# Patient Record
Sex: FEMALE | Race: WHITE | NOT HISPANIC OR LATINO | Employment: UNEMPLOYED | URBAN - METROPOLITAN AREA
[De-identification: names, ages, dates, MRNs, and addresses within clinical notes are randomized per-mention and may not be internally consistent; named-entity substitution may affect disease eponyms.]

---

## 2018-01-10 NOTE — PROGRESS NOTES
2016         RE: Lurline Cushing                                   To: ParasANDRZEJ Paulino    MR#: [id number]   : [facility] [address]: [id number]                             Fax: [phone]   (Exam #: [id number])      The LMP of this 23year old,  G1, P0-0-0-0 patient was 2016, her   working MIO is 2016 and the current gestational age is 25 weeks 0   days by 1st Trimester Sono  A sonographic examination was performed on 2016 using real time equipment  The ultrasound examination was   performed using abdominal & vaginal techniques  The patient has a BMI of   38 0  Her blood pressure today was 115/72  Earliest ultrasound found in her record: 16 8w1d 16 MIO      Thank you very much for your kind referral of Lurline Cushing to the [facility] in [facility] in 2016 for level II ultrasound evaluation   and  consult  George Morales is a 24-year-old primigravida who is   currently at 20-0/7 weeks gestation by an estimated due date of 2016  Her prenatal course so far has been unremarkable, with the   exception of a Pap smear which revealed HGSIL  George Morales has no complaints  She reports fetal movement and denies vaginal bleeding  She declined   genetic screening earlier in the pregnancy  She has not yet been screened   for gestational diabetes during this pregnancy      George Morales is morbidly obese, with a current BMI of 38 0  She has a history   of a seizure disorder as a child, currently not treated medically  She has   had no seizure activity for many years  She has a history of mild,   intermittent asthma, for which she uses a rescue inhaler as needed, and a   history of a spinal fracture  Her past surgical history is negative  George Morales currently takes no medication with the exception of a prenatal   vitamin on a daily basis and has no known drug allergy   She denies    alcohol or illicit drug use during the pregnancy, but smokes one quarter   pack of cigarettes per day  The family genetic history is negative with   respect to genetic abnormalities, birth defects, or mental retardation  Her dad has hypertension  The family medical history is otherwise negative   with respect to first degree relatives with hypertension, diabetes, or   venous thromboembolism  Cardiac motion was observed at 150 bpm       INDICATIONS      fetal anatomical survey   morbid obesity      Exam Types      LEVEL II   Transvaginal      RESULTS      Fetus # 1 of 1   Transverse presentation   Fetal growth appeared normal   Placenta Location = Anterior   No placenta previa   Placenta Grade = I      MEASUREMENTS (* Included In Average GA)      AC              14 3 cm        19 weeks 2 days* (38%)   BPD              4 5 cm        19 weeks 4 days* (41%)   HC              17 3 cm        19 weeks 6 days* (41%)   Femur            3 3 cm        20 weeks 4 days* (51%)      Nuchal Fold      3 3 mm      Humerus          3 1 cm        20 weeks 2 days  (59%)   Radius           2 5 cm        20 weeks 3 days   Ulna             2 8 cm        20 weeks 1 day   Tibia            2 8 cm        19 weeks 6 days  (43%)   Fibula           2 8 cm        19 weeks 4 days   Foot             3 7 cm        21 weeks 4 days      Cerebellum       2 1 cm        20 weeks 4 days   Biorbit          3 1 cm        19 weeks 5 days   CisternaMagna    4 1 mm      HC/AC           1 21   FL/AC           0 23   FL/BPD          0 74   EFW (Ac/Fl/Hc)   321 grams - 0 lbs 11 oz      THE AVERAGE GESTATIONAL AGE is 19 weeks 6 days +/- 10 days  AMNIOTIC FLUID         Largest Vertical Pocket = 6 0 cm   Amniotic Fluid: Normal      UTERINE ARTERIES                                  S/D   PI    RI    NOTCH       Left Uterine Artery              1 20         No       Right Uterine Artery             1 39         No      CERVICAL EVALUATION      The cervix appeared normal (Ultrasound Examination)        SUPINE      Cervical Length: 3 00 cm      OTHER TEST RESULTS           Funneling?: No             Dynamic Changes?: No        Resp  To TFP?: No                      Debris?: No      ANATOMY      Head                                    Normal   Face/Neck                               See Details   Th  Cav  Normal   Heart                                   Normal   Abd  Cav  Normal   Stomach                                 Normal   Right Kidney                            Normal   Left Kidney                             Normal   Bladder                                 Normal   Abd  Wall                               Normal   Spine                                   Normal   Extrems                                 Normal   Genitalia                               Normal   Placenta                                Normal   Umbl  Cord                              Normal   Uterus                                  Normal   PCI                                     Normal      ANATOMY DETAILS      Visualized Appearing Sonographically Normal:   HEAD: (Calvarium, BPD Level, Cavum, Lateral Ventricles, Choroid Plexus,   Cerebellum, Cisterna Magna);    FACE/NECK: (Neck, Nuchal Fold, Profile,   Orbits, Nose/Lips);    TH  CAV : (Diaphragm); HEART: (Four Chamber   View, Proximal Left Outflow, Proximal Right Outflow, 3 Vessel Trachea,   Short Axis of Greater Vessels, Ductal Arch, Aortic Arch, Interventricular   Septum, Interatrial Septum, Cardiac Axis, Cardiac Position);    ABD  CAV ,   STOMACH, RIGHT KIDNEY, LEFT KIDNEY, BLADDER, ABD  WALL, SPINE: (Cervical   Spine, Thoracic Spine, Lumbar Spine, Sacrum);    EXTREMS: (Lt Humerus, Rt   Humerus, Lt Forearm, Rt Forearm, Lt Hand, Rt Hand, Lt Femur, Rt Femur, Lt   Low Leg, Rt Low Leg, Lt Foot, Rt Foot);    GENITALIA (Female), PLACENTA,   UMBL   CORD, UTERUS, PCI      Not Visualized:   FACE/NECK: (Palate)      ADNEXA      The left ovary appeared normal and measured 4 9 x 2 8 x 1 5 cm with a   volume of 10 8 cc  The right ovary appeared normal and measured 3 1 x 2 5   x 1 7 cm with a volume of 6 9 cc  IMPRESSION      Alberto IUP   19 weeks and 6 days by this ultrasound  (MIO=DEC 1 2016)   20 weeks and 0 days by 1st Tri Sono  (MIO=2016)   Transverse presentation   Fetal growth appeared normal   Regular fetal heart rate of 150 bpm   Anterior placenta   No placenta previa      GENERAL COMMENT      No fetal structural abnormality or ultrasound marker for aneuploidy is   identified on the Level II ultrasound study today  The palate is   suboptimally imaged  Fetal growth, amniotic fluid volume, and maternal   uterine artery Doppler study are normal   The placenta is normal in   appearance  The cervix is normal in appearance by transvaginal sonography  Today's ultrasound findings and suggested follow-up were discussed in   detail with Ramona  We discussed that prenatal ultrasound cannot rule out   all congenital abnormalities  Morbid obesity is associated with an   increased risk for adverse pregnancy outcomes, including gestational   diabetes, fetal growth abnormalities including macrosomia, fetal   structural abnormalities, preeclampsia, venous thromboembolism,   stillbirth, and increased likelihood for  section  Arthur Cody will   return to the [address] [place] [address] at 28 weeks gestation to assess interval   growth  Evaluation of growth is also recommended in the mid to late third   trimester  Nonstress testing may be considered for additional pregnancy   surveillance beginning at 36 weeks gestation, earlier if otherwise   clinically indicated  Screening screening for gestational diabetes should   be considered soon  We also discussed that tobacco use during pregnancy is   associated with an increased risk for adverse pregnancy outcomes,   including  delivery, IUGR, abruption, and stillbirth     Enrollment   in a smoking cessation program should be considered  The face to face time, in addition to time spent discussing ultrasound   results, was 15 minutes, greater than 50% of which was spent during   counseling and coordination of care  BRITTANEY Hatfield M D     Maternal-Fetal Medicine   Electronically signed 07/17/16 10:36

## 2018-01-10 NOTE — PROGRESS NOTES
SEP 29 2016         RE: Maria Luisa Alice                                   To: ANDRZEJ Mayorga JEVON    MR#: [id number]   : [address] [place] [address] Street: [id number]                             Fax: [phone]   (Exam #: [id number])      The LMP of this 19year old,  G1, P0-0-0-0 patient was 2016, her   working MIO is 2016 and the current gestational age is 34 weeks 1   day by 1st Trimester Sono  A sonographic examination was performed on SEP   29 2016 using real time equipment  The ultrasound examination was   performed using abdominal technique  The patient has a BMI of 41 0  Her   blood pressure today was 95/59  Earliest ultrasound found in her record: 16 8w1d 16 MIO      Cardiac motion was observed at 130 bpm       INDICATIONS      morbid obesity   fetal growth   missed anatomy follow up      Exam Types      Level I      RESULTS      Fetus # 1 of 1   Vertex presentation   Fetal growth appeared normal   Placenta Location = Anterior   No placenta previa      MEASUREMENTS (* Included In Average GA)      AC              27 6 cm        31 weeks 5 days* (58%)   BPD              7 7 cm        31 weeks 1 day * (45%)   HC              29 4 cm        32 weeks 0 days* (52%)   Femur            6 2 cm        31 weeks 6 days* (63%)      Cerebellum       3 9 cm        32 weeks 6 days   CisternaMagna    8 1 mm      HC/AC           1 07   FL/AC           0 22   FL/BPD          0 80   EFW (Ac/Fl/Hc)  1855 grams - 4 lbs 1 oz                 (57%)      THE AVERAGE GESTATIONAL AGE is 31 weeks 5 days +/- 18 days  AMNIOTIC FLUID      Q1: 4 1      Q2: 5 8      Q3: 4 3      Q4: 3 2   JUAN Total = 17 3 cm   Amniotic Fluid: Normal      ANATOMY DETAILS      Visualized Appearing Sonographically Normal:   HEAD: (Calvarium, BPD Level, Cavum, Lateral Ventricles, Choroid Plexus,   Cerebellum, Cisterna Magna);    FACE/NECK: (Nose/Lips, Palate, Face);      TH  CAV : (Diaphragm);     HEART: [facility], Proximal Left   Outflow, Proximal Right Outflow, 3 Vessel Trachea, Short Axis of Greater   Vessels, Interventricular Septum, Interatrial Septum, Cardiac Axis,   Cardiac Position);    STOMACH, RIGHT KIDNEY, LEFT KIDNEY, BLADDER, PLACENTA      ANATOMY COMMENTS      The prior fetal anatomic survey was limited the area of the palate  These   anatomic views were seen today as sonographically normal within the   inherent limitations of fetal ultrasound  IMPRESSION      Alberto IUP   31 weeks and 5 days by this ultrasound  (MIO=NOV 26 2016)   31 weeks and 1 day by 1st Tri Sono  (MIO=NOV 30 2016)   Vertex presentation   Fetal growth appeared normal   Regular fetal heart rate of 130 bpm   Anterior placenta   No placenta previa      GENERAL COMMENT      On exam today the patient appears well, in no acute distress, and denies   any complaints  Her abdomen is non-tender  The fetal anatomic survey is now complete  There is no sonographic   evidence of fetal abnormalities at this time  The remainder of the survey   was completed previously  There has been appropriate interval fetal   growth  Good fetal movement and tone are seen  The amniotic fluid volume   appears normal   The placenta is anterior and it appears sonographically   normal   The patient was informed of today's findings and all of her   questions were answered  The limitations of ultrasound were reviewed with   the patient, which she accepts  Precautions and fetal kick counts were   reviewed  Recommend the patient return in weeks to initiate weekly antepartum   surveillance in the indication of body mass index greater than 40 which is   independent risk factor for stillbirth  Thank you very much for allowing us to participate in the care of this   very nice patient  Should you have any questions, please do not hesitate   to contact our office  BRITTANEY Castaneda M D     Electronically signed 09/29/16

## 2018-01-12 NOTE — PROGRESS NOTES
2016         RE: Shelda Scheuermann                                   To: ANDRZEJ Nichols    MR#: [id number]   : [address]: [id number]                             Fax: [phone]   (Exam #: [id number])      The LMP of this 19year old,  G1, P0-0-0-0 patient was 2016, her   working MIO is 2016 and the current gestational age is 42 weeks 0   days by 1st Trimester Sono  A sonographic examination was performed on 2016 using real time equipment  The ultrasound examination was performed   using abdominal technique  The patient has a BMI of 43 0  Her blood   pressure today was 116/88  Earliest ultrasound found in her record: 16 8w1d 16 MIO      Cardiac motion was observed at 121 bpm       INDICATIONS      morbid obesity      Exam Types      Amniotic Fluid Index      RESULTS      Fetus # 1 of 1   Vertex presentation   Placenta Location = Anterior   No placenta previa   Placenta Grade = II      AMNIOTIC FLUID      Q1: 4 2      Q2: 2 0      Q3: 5 6      Q4: 4 2   JUAN Total = 16 0 cm   Amniotic Fluid: Normal      IMPRESSION      Alberto IUP   36 weeks and 0 days by 1st Tri Sono  (MIO=2016)   Vertex presentation   Regular fetal heart rate of 121 bpm   Anterior placenta   No placenta previa      GENERAL COMMENT      The patient presented today for antepartum fetal surveillance secondary to   morbid obesity  She had a modified biophysical profile, which includes an   NST and evaluation of the amniotic fluid  The NST was reactive and   reassuring  The amniotic fluid index was 16 0 cm, which is normal for   gestational age  Recommend continued weekly fetal testing secondary to morbid obesity  Thank very much for allowing us to participate in the care of your   patient  Should you have any questions, please do not hesitate to contact   our office  BRITTANEY Milligan M D     Electronically signed 16 07:11

## 2020-06-11 ENCOUNTER — OFFICE VISIT (OUTPATIENT)
Dept: URGENT CARE | Facility: CLINIC | Age: 25
End: 2020-06-11
Payer: COMMERCIAL

## 2020-06-11 VITALS
TEMPERATURE: 98.2 F | RESPIRATION RATE: 18 BRPM | DIASTOLIC BLOOD PRESSURE: 65 MMHG | OXYGEN SATURATION: 98 % | HEART RATE: 73 BPM | SYSTOLIC BLOOD PRESSURE: 139 MMHG

## 2020-06-11 DIAGNOSIS — L02.221 FURUNCLE OF ABDOMINAL WALL: Primary | ICD-10-CM

## 2020-06-11 PROCEDURE — 99213 OFFICE O/P EST LOW 20 MIN: CPT | Performed by: PHYSICIAN ASSISTANT

## 2020-06-11 PROCEDURE — 90715 TDAP VACCINE 7 YRS/> IM: CPT

## 2020-06-11 PROCEDURE — 90471 IMMUNIZATION ADMIN: CPT | Performed by: PHYSICIAN ASSISTANT

## 2020-06-11 PROCEDURE — S9088 SERVICES PROVIDED IN URGENT: HCPCS | Performed by: PHYSICIAN ASSISTANT

## 2020-06-11 RX ORDER — DOXYCYCLINE 100 MG/1
100 CAPSULE ORAL 2 TIMES DAILY
Qty: 20 CAPSULE | Refills: 0 | Status: SHIPPED | OUTPATIENT
Start: 2020-06-11 | End: 2020-06-21

## 2021-07-28 ENCOUNTER — APPOINTMENT (EMERGENCY)
Dept: NON INVASIVE DIAGNOSTICS | Facility: HOSPITAL | Age: 26
End: 2021-07-28
Payer: COMMERCIAL

## 2021-07-28 ENCOUNTER — APPOINTMENT (EMERGENCY)
Dept: RADIOLOGY | Facility: HOSPITAL | Age: 26
End: 2021-07-28
Payer: COMMERCIAL

## 2021-07-28 ENCOUNTER — HOSPITAL ENCOUNTER (EMERGENCY)
Facility: HOSPITAL | Age: 26
Discharge: HOME | End: 2021-07-28
Attending: EMERGENCY MEDICINE | Admitting: EMERGENCY MEDICINE
Payer: COMMERCIAL

## 2021-07-28 ENCOUNTER — APPOINTMENT (EMERGENCY)
Dept: CT IMAGING | Facility: HOSPITAL | Age: 26
End: 2021-07-28
Payer: COMMERCIAL

## 2021-07-28 VITALS
OXYGEN SATURATION: 96 % | DIASTOLIC BLOOD PRESSURE: 73 MMHG | RESPIRATION RATE: 16 BRPM | TEMPERATURE: 98.6 F | HEART RATE: 72 BPM | SYSTOLIC BLOOD PRESSURE: 109 MMHG
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
## 2021-07-28 LAB
ANION GAP SERPL CALCULATED.3IONS-SCNC: 10 MMOL/L (ref 4–13)
BASOPHILS # BLD AUTO: 0 THOUSANDS/ΜL (ref 0–0.1)
BASOPHILS NFR BLD AUTO: 0 % (ref 0–2)
BUN SERPL-MCNC: 25 MG/DL (ref 7–25)
CALCIUM SERPL-MCNC: 10.1 MG/DL (ref 8.6–10.5)
CHLORIDE SERPL-SCNC: 99 MMOL/L (ref 98–107)
CO2 SERPL-SCNC: 30 MMOL/L (ref 21–31)
CREAT SERPL-MCNC: 0.93 MG/DL (ref 0.6–1.2)
EOSINOPHIL # BLD AUTO: 0 THOUSAND/ΜL (ref 0–0.61)
EOSINOPHIL NFR BLD AUTO: 0 % (ref 0–5)
ERYTHROCYTE [DISTWIDTH] IN BLOOD BY AUTOMATED COUNT: 14.3 % (ref 11.5–14.5)
EXT PREG TEST URINE: NEGATIVE
EXT. CONTROL ED NAV: NORMAL
GFR SERPL CREATININE-BSD FRML MDRD: 84 ML/MIN/1.73SQ M
GLUCOSE SERPL-MCNC: 99 MG/DL (ref 65–99)
HCT VFR BLD AUTO: 36.1 % (ref 42–47)
HGB BLD-MCNC: 11.9 G/DL (ref 12–16)
LYMPHOCYTES # BLD AUTO: 1.5 THOUSANDS/ΜL (ref 0.6–4.47)
LYMPHOCYTES NFR BLD AUTO: 18 % (ref 21–51)
MCH RBC QN AUTO: 27.9 PG (ref 26–34)
MCHC RBC AUTO-ENTMCNC: 33 G/DL (ref 31–37)
MCV RBC AUTO: 85 FL (ref 81–99)
MONOCYTES # BLD AUTO: 1.1 THOUSAND/ΜL (ref 0.17–1.22)
MONOCYTES NFR BLD AUTO: 13 % (ref 2–12)
NEUTROPHILS # BLD AUTO: 5.6 THOUSANDS/ΜL (ref 1.4–6.5)
NEUTS SEG NFR BLD AUTO: 68 % (ref 42–75)
PLATELET # BLD AUTO: 246 THOUSANDS/UL (ref 149–390)
PMV BLD AUTO: 9 FL (ref 8.6–11.7)
POTASSIUM SERPL-SCNC: 3.8 MMOL/L (ref 3.5–5.5)
RBC # BLD AUTO: 4.26 MILLION/UL (ref 3.9–5.2)
SODIUM SERPL-SCNC: 139 MMOL/L (ref 134–143)
WBC # BLD AUTO: 8.2 THOUSAND/UL (ref 4.8–10.8)

## 2021-07-28 PROCEDURE — 81025 URINE PREGNANCY TEST: CPT | Performed by: EMERGENCY MEDICINE

## 2021-07-28 PROCEDURE — 36415 COLL VENOUS BLD VENIPUNCTURE: CPT | Performed by: EMERGENCY MEDICINE

## 2021-07-28 PROCEDURE — 80048 BASIC METABOLIC PNL TOTAL CA: CPT | Performed by: EMERGENCY MEDICINE

## 2021-07-28 PROCEDURE — 70498 CT ANGIOGRAPHY NECK: CPT

## 2021-07-28 PROCEDURE — 99284 EMERGENCY DEPT VISIT MOD MDM: CPT

## 2021-07-28 PROCEDURE — 85025 COMPLETE CBC W/AUTO DIFF WBC: CPT | Performed by: EMERGENCY MEDICINE

## 2021-07-28 PROCEDURE — 99284 EMERGENCY DEPT VISIT MOD MDM: CPT | Performed by: EMERGENCY MEDICINE

## 2021-07-28 RX ADMIN — IOHEXOL 85 ML: 350 INJECTION, SOLUTION INTRAVENOUS at 14:20

## 2021-07-28 NOTE — ED PROVIDER NOTES
History  Chief Complaint   Patient presents with    Medical Problem     needle tip stuck in neck  This is a 57-year-old female who reports a needle in her neck  She states that she was shooting went broke off  She states that it was a clean needle  It is not causing her significant pain  She does not think that it is in a blood vessel at this time  This happen yesterday  Reports she otherwise feels well  He has never had this happen before  Prior to Admission Medications   Prescriptions Last Dose Informant Patient Reported? Taking? Prenatal Vit-Iron Carbonyl-FA (PRENATAL MULTIVITAMIN) TABS   Yes No   Sig: Take 1 tablet by mouth daily   acetaminophen (TYLENOL) 325 mg tablet   No No   Sig: Take 1 tablet by mouth every 6 (six) hours as needed for headaches   Patient not taking: Reported on 6/11/2020   benzocaine-menthol-lanolin-aloe (DERMOPLAST) 20-0 5 % topical spray   No No   Sig: Apply 1 application topically 4 (four) times a day as needed for mild pain for up to 30 days   docusate sodium (COLACE) 100 mg capsule   No No   Sig: Take 1 capsule by mouth 2 (two) times a day for 30 days   hydrocortisone 1 % cream   No No   Sig: Apply 1 application topically daily as needed for rash for up to 13 days   ibuprofen (MOTRIN) 600 mg tablet   No No   Sig: Take 1 tablet by mouth every 6 (six) hours as needed for mild pain for up to 30 days   witch hazel-glycerin (TUCKS) topical pad   No No   Sig: Apply 1 pad topically as needed for irritation for up to 30 days      Facility-Administered Medications: None       Past Medical History:   Diagnosis Date    HSIL on Pap smear of cervix        History reviewed  No pertinent surgical history  History reviewed  No pertinent family history  I have reviewed and agree with the history as documented      E-Cigarette/Vaping    E-Cigarette Use Never User      E-Cigarette/Vaping Substances     Social History     Tobacco Use    Smoking status: Current Every Day Smoker     Packs/day: 0 25    Smokeless tobacco: Never Used   Vaping Use    Vaping Use: Never used   Substance Use Topics    Alcohol use: No    Drug use: Yes     Types: Heroin       Review of Systems   Constitutional: Negative for activity change, chills, fatigue and fever  HENT: Negative for congestion  Eyes: Negative for visual disturbance  Respiratory: Negative for cough, chest tightness and shortness of breath  Cardiovascular: Negative for chest pain  Gastrointestinal: Negative for abdominal pain, diarrhea and vomiting  Genitourinary: Negative for dysuria  Skin: Negative for rash  Neurological: Negative for dizziness, weakness and numbness  Physical Exam  Physical Exam  Constitutional:       General: She is not in acute distress  Appearance: She is well-developed  She is not ill-appearing, toxic-appearing or diaphoretic  HENT:      Head: Normocephalic and atraumatic  Eyes:      Conjunctiva/sclera: Conjunctivae normal       Pupils: Pupils are equal, round, and reactive to light  Cardiovascular:      Rate and Rhythm: Normal rate and regular rhythm  Heart sounds: Normal heart sounds  Pulmonary:      Effort: Pulmonary effort is normal  No respiratory distress  Breath sounds: Normal breath sounds  Abdominal:      General: Bowel sounds are normal       Palpations: Abdomen is soft  Musculoskeletal:         General: Normal range of motion  Cervical back: Normal range of motion and neck supple  Skin:     General: Skin is warm and dry  Capillary Refill: Capillary refill takes less than 2 seconds  Comments: Palpable subcutaneous object on the right lower neck   Neurological:      Mental Status: She is alert and oriented to person, place, and time     Psychiatric:         Behavior: Behavior normal          Vital Signs  ED Triage Vitals   Temperature Pulse Respirations Blood Pressure SpO2   07/28/21 1234 07/28/21 1234 07/28/21 1234 07/28/21 1234 07/28/21 1234   98 6 °F (37 °C) 99 16 119/77 96 %      Temp Source Heart Rate Source Patient Position - Orthostatic VS BP Location FiO2 (%)   07/28/21 1234 07/28/21 1400 07/28/21 1457 07/28/21 1457 --   Oral Monitor Lying Right arm       Pain Score       07/28/21 1234       6           Vitals:    07/28/21 1234 07/28/21 1400 07/28/21 1457   BP: 119/77 111/74 109/73   Pulse: 99 78 72   Patient Position - Orthostatic VS:   Lying         Visual Acuity      ED Medications  Medications   iohexol (OMNIPAQUE) 350 MG/ML injection (SINGLE-DOSE) 85 mL (85 mL Intravenous Given 7/28/21 1420)       Diagnostic Studies  Results Reviewed     Procedure Component Value Units Date/Time    Basic metabolic panel [[id number]] Collected: 07/28/21 1318    Lab Status: Final result Specimen: Blood from Arm, Left Updated: 07/28/21 1347     Sodium 139 mmol/L      Potassium 3 8 mmol/L      Chloride 99 mmol/L      CO2 30 mmol/L      ANION GAP 10 mmol/L      BUN 25 mg/dL      Creatinine 0 93 mg/dL      Glucose 99 mg/dL      Calcium 10 1 mg/dL      eGFR 84 ml/min/1 73sq m     Narrative:      [place] guidelines for Chronic Kidney Disease (CKD):     Stage 1 with normal or high GFR (GFR > 90 mL/min/1 73 square meters)    Stage 2 Mild CKD (GFR = 60-89 mL/min/1 73 square meters)    Stage 3A Moderate CKD (GFR = 45-59 mL/min/1 73 square meters)    Stage 3B Moderate CKD (GFR = 30-44 mL/min/1 73 square meters)    Stage 4 Severe CKD (GFR = 15-29 mL/min/1 73 square meters)    Stage 5 End Stage CKD (GFR <15 mL/min/1 73 square meters)  Note: GFR calculation is accurate only with a steady state creatinine    POCT pregnancy, urine [[id number]]  (Normal) Resulted: 07/28/21 1328    Lab Status: Final result Updated: 07/28/21 1328     EXT PREG TEST UR (Ref: Negative) negative     Control valid    CBC and differential [[id number]]  (Abnormal) Collected: 07/28/21 1318    Lab Status: Final result Specimen: Blood from Arm, Left Updated: 07/28/21 1324 WBC 8 20 Thousand/uL      RBC 4 26 Million/uL      Hemoglobin 11 9 g/dL      Hematocrit 36 1 %      MCV 85 fL      MCH 27 9 pg      MCHC 33 0 g/dL      RDW 14 3 %      MPV 9 0 fL      Platelets 312 Thousands/uL      Neutrophils Relative 68 %      Lymphocytes Relative 18 %      Monocytes Relative 13 %      Eosinophils Relative 0 %      Basophils Relative 0 %      Neutrophils Absolute 5 60 Thousands/µL      Lymphocytes Absolute 1 50 Thousands/µL      Monocytes Absolute 1 10 Thousand/µL      Eosinophils Absolute 0 00 Thousand/µL      Basophils Absolute 0 00 Thousands/µL                  CTA neck with and without contrast   Final Result by Stepan Brown DO (07/28 1433)      Small, 7 mm, metallic foreign body identified within the right anterior inferior soft tissues of the neck best seen on series 8 image 37, suggestive of a small needle fragment  No soft tissue mass or adenopathy  Workstation performed: [id number]                    Procedures  Procedures         ED Course  ED Course as of Jul 28 1558 Wed Jul 28, 2021   1455 Awaiting ENT consult  MDM  Number of Diagnoses or Management Options  Accident caused by a hypodermic needle, initial encounter: new and requires workup  Diagnosis management comments: This is a 13-year-old female with a retained hypodermic needle  Case discussed with vascular Dr Costa Andersen who recommended CTA  Needle does not appear to be in any blood vessels and is quite superficial   Case discussed with ENT Dr Owen Alfaro who stated they will see her tomorrow in Dr Gabby Palmer and anastasiia' office  Discussed warning signs and symptoms with the patient as well as when to return to the emergency department versus follow up with PC P  Patient states understanding and agreement with the plan  This note was completed using dictation software            Amount and/or Complexity of Data Reviewed  Clinical lab tests: reviewed and ordered  Tests in the radiology section of CPT®: ordered and reviewed  Discuss the patient with other providers: yes        Disposition  Final diagnoses:   Accident caused by a hypodermic needle, initial encounter     Time reflects when diagnosis was documented in both MDM as applicable and the Disposition within this note     Time User Action Codes Description Comment    7/28/2021  2:52 PM Staci Tim Add Gunjanphrodglen Young  0XXA] Accident caused by a hypodermic needle, initial encounter       ED Disposition     ED Disposition Condition Date/Time Comment    Discharge Stable Wed Jul 28, 2021  2:51 PM [address] Miller discharge to home/self care              Follow-up Information     Follow up With Specialties Details Why [address]  [place], DO Otolaryngology In 1 day  [address]  [address] [facility]  [phone]            Discharge Medication List as of 7/28/2021  2:56 PM      CONTINUE these medications which have NOT CHANGED    Details   acetaminophen (TYLENOL) 325 mg tablet Take 1 tablet by mouth every 6 (six) hours as needed for headaches, Starting 11/29/2016, Until Discontinued, Print      benzocaine-menthol-lanolin-aloe (DERMOPLAST) 20-0 5 % topical spray Apply 1 application topically 4 (four) times a day as needed for mild pain for up to 30 days, Starting 11/29/2016, Until Thu 12/29/16, Print      docusate sodium (COLACE) 100 mg capsule Take 1 capsule by mouth 2 (two) times a day for 30 days, Starting 11/29/2016, Until Thu 12/29/16, Print      hydrocortisone 1 % cream Apply 1 application topically daily as needed for rash for up to 13 days, Starting 11/29/2016, Until Mon 12/12/16, Print      ibuprofen (MOTRIN) 600 mg tablet Take 1 tablet by mouth every 6 (six) hours as needed for mild pain for up to 30 days, Starting 11/29/2016, Until Thu 12/29/16, Print      Prenatal Vit-Iron Carbonyl-FA (PRENATAL MULTIVITAMIN) TABS Take 1 tablet by mouth daily, Until Discontinued, Historical Med witch hazel-glycerin (TUCKS) topical pad Apply 1 pad topically as needed for irritation for up to 30 days, Starting 11/29/2016, Until Thu 12/29/16, Print               PDMP Review     None          ED Provider  Electronically Signed by           Etelvina Downs MD  07/28/21 7201    Contacted patient to ensure that she had followed up on scheduling with ENT tomorrow  She had not  I again gave her the information and contact number  She said should call immediately  This happened around 4 pm       Received a tiger text from the ENTs office at approximately 4:45 a m  stating that she still had not called  I called 2 more times to contact the patient this time she did not answer         Etelvina Downs MD  07/28/21 2474

## 2021-10-19 ENCOUNTER — OFFICE VISIT (OUTPATIENT)
Dept: FAMILY MEDICINE CLINIC | Facility: CLINIC | Age: 26
End: 2021-10-19
Payer: COMMERCIAL

## 2021-10-19 VITALS
DIASTOLIC BLOOD PRESSURE: 84 MMHG | HEART RATE: 71 BPM | WEIGHT: 165.5 LBS | BODY MASS INDEX: 31.25 KG/M2 | OXYGEN SATURATION: 99 % | HEIGHT: 61 IN | TEMPERATURE: 98.4 F | SYSTOLIC BLOOD PRESSURE: 120 MMHG

## 2021-10-19 DIAGNOSIS — Z11.4 SCREENING FOR HIV (HUMAN IMMUNODEFICIENCY VIRUS): ICD-10-CM

## 2021-10-19 DIAGNOSIS — Z23 ENCOUNTER FOR IMMUNIZATION: ICD-10-CM

## 2021-10-19 DIAGNOSIS — R79.89 ELEVATED LIVER FUNCTION TESTS: ICD-10-CM

## 2021-10-19 DIAGNOSIS — S10.85XA: ICD-10-CM

## 2021-10-19 DIAGNOSIS — Z13.0 SCREENING FOR DEFICIENCY ANEMIA: ICD-10-CM

## 2021-10-19 DIAGNOSIS — Z11.59 NEED FOR HEPATITIS C SCREENING TEST: ICD-10-CM

## 2021-10-19 DIAGNOSIS — Z76.89 ENCOUNTER TO ESTABLISH CARE: Primary | ICD-10-CM

## 2021-10-19 PROCEDURE — 3008F BODY MASS INDEX DOCD: CPT | Performed by: NURSE PRACTITIONER

## 2021-10-19 PROCEDURE — 3725F SCREEN DEPRESSION PERFORMED: CPT | Performed by: NURSE PRACTITIONER

## 2021-10-19 PROCEDURE — 99203 OFFICE O/P NEW LOW 30 MIN: CPT | Performed by: NURSE PRACTITIONER

## 2021-10-19 RX ORDER — NALOXONE HYDROCHLORIDE 4 MG/.1ML
SPRAY NASAL AS NEEDED
COMMUNITY
Start: 2021-10-03

## 2021-10-21 ENCOUNTER — APPOINTMENT (OUTPATIENT)
Dept: LAB | Facility: HOSPITAL | Age: 26
End: 2021-10-21
Payer: COMMERCIAL

## 2021-10-21 DIAGNOSIS — Z13.0 SCREENING FOR DEFICIENCY ANEMIA: ICD-10-CM

## 2021-10-21 DIAGNOSIS — R79.89 ELEVATED LIVER FUNCTION TESTS: ICD-10-CM

## 2021-10-21 DIAGNOSIS — Z11.59 NEED FOR HEPATITIS C SCREENING TEST: ICD-10-CM

## 2021-10-21 DIAGNOSIS — Z11.4 SCREENING FOR HIV (HUMAN IMMUNODEFICIENCY VIRUS): ICD-10-CM

## 2021-10-21 LAB
ALBUMIN SERPL BCP-MCNC: 4.2 G/DL (ref 3.5–5.7)
ALP SERPL-CCNC: 50 U/L (ref 40–150)
ALT SERPL W P-5'-P-CCNC: 336 U/L (ref 7–52)
ANION GAP SERPL CALCULATED.3IONS-SCNC: 11 MMOL/L (ref 4–13)
AST SERPL W P-5'-P-CCNC: 195 U/L (ref 13–39)
BASOPHILS # BLD AUTO: 0 THOUSANDS/ΜL (ref 0–0.1)
BASOPHILS NFR BLD AUTO: 0 % (ref 0–2)
BILIRUB SERPL-MCNC: 0.6 MG/DL (ref 0.2–1)
BUN SERPL-MCNC: 12 MG/DL (ref 7–25)
CALCIUM SERPL-MCNC: 9.2 MG/DL (ref 8.6–10.5)
CHLORIDE SERPL-SCNC: 102 MMOL/L (ref 98–107)
CO2 SERPL-SCNC: 27 MMOL/L (ref 21–31)
CREAT SERPL-MCNC: 0.62 MG/DL (ref 0.6–1.2)
EOSINOPHIL # BLD AUTO: 0 THOUSAND/ΜL (ref 0–0.61)
EOSINOPHIL NFR BLD AUTO: 0 % (ref 0–5)
ERYTHROCYTE [DISTWIDTH] IN BLOOD BY AUTOMATED COUNT: 15 % (ref 11.5–14.5)
GFR SERPL CREATININE-BSD FRML MDRD: 123 ML/MIN/1.73SQ M
GLUCOSE P FAST SERPL-MCNC: 108 MG/DL (ref 65–99)
HCT VFR BLD AUTO: 36.3 % (ref 42–47)
HCV AB SER QL: NORMAL
HGB BLD-MCNC: 11.9 G/DL (ref 12–16)
LYMPHOCYTES # BLD AUTO: 1.2 THOUSANDS/ΜL (ref 0.6–4.47)
LYMPHOCYTES NFR BLD AUTO: 15 % (ref 21–51)
MCH RBC QN AUTO: 28.8 PG (ref 26–34)
MCHC RBC AUTO-ENTMCNC: 32.9 G/DL (ref 31–37)
MCV RBC AUTO: 88 FL (ref 81–99)
MONOCYTES # BLD AUTO: 0.4 THOUSAND/ΜL (ref 0.17–1.22)
MONOCYTES NFR BLD AUTO: 5 % (ref 2–12)
NEUTROPHILS # BLD AUTO: 6.4 THOUSANDS/ΜL (ref 1.4–6.5)
NEUTS SEG NFR BLD AUTO: 80 % (ref 42–75)
PLATELET # BLD AUTO: 336 THOUSANDS/UL (ref 149–390)
PMV BLD AUTO: 8.6 FL (ref 8.6–11.7)
POTASSIUM SERPL-SCNC: 3.8 MMOL/L (ref 3.5–5.5)
PROT SERPL-MCNC: 7.1 G/DL (ref 6.4–8.9)
RBC # BLD AUTO: 4.14 MILLION/UL (ref 3.9–5.2)
SODIUM SERPL-SCNC: 140 MMOL/L (ref 134–143)
WBC # BLD AUTO: 8 THOUSAND/UL (ref 4.8–10.8)

## 2021-10-21 PROCEDURE — 85025 COMPLETE CBC W/AUTO DIFF WBC: CPT

## 2021-10-21 PROCEDURE — 36415 COLL VENOUS BLD VENIPUNCTURE: CPT

## 2021-10-21 PROCEDURE — 87389 HIV-1 AG W/HIV-1&-2 AB AG IA: CPT

## 2021-10-21 PROCEDURE — 86803 HEPATITIS C AB TEST: CPT

## 2021-10-21 PROCEDURE — 80053 COMPREHEN METABOLIC PANEL: CPT

## 2021-10-23 LAB — HIV 1+2 AB+HIV1 P24 AG SERPL QL IA: NORMAL

## 2021-10-24 ENCOUNTER — HOSPITAL ENCOUNTER (EMERGENCY)
Facility: HOSPITAL | Age: 26
Discharge: HOME | End: 2021-10-24
Attending: EMERGENCY MEDICINE | Admitting: EMERGENCY MEDICINE
Payer: COMMERCIAL

## 2021-10-24 VITALS
HEART RATE: 89 BPM | SYSTOLIC BLOOD PRESSURE: 116 MMHG | RESPIRATION RATE: 16 BRPM | DIASTOLIC BLOOD PRESSURE: 75 MMHG | OXYGEN SATURATION: 98 % | TEMPERATURE: 98.4 F

## 2021-10-24 DIAGNOSIS — S10.85XA: ICD-10-CM

## 2021-10-24 DIAGNOSIS — T50.901A OVERDOSE: Primary | ICD-10-CM

## 2021-10-24 PROCEDURE — 99284 EMERGENCY DEPT VISIT MOD MDM: CPT | Performed by: EMERGENCY MEDICINE

## 2021-10-24 PROCEDURE — 96375 TX/PRO/DX INJ NEW DRUG ADDON: CPT

## 2021-10-24 PROCEDURE — 99284 EMERGENCY DEPT VISIT MOD MDM: CPT

## 2021-10-24 PROCEDURE — 96374 THER/PROPH/DIAG INJ IV PUSH: CPT

## 2021-10-24 RX ORDER — ONDANSETRON 2 MG/ML
4 INJECTION INTRAMUSCULAR; INTRAVENOUS ONCE
Status: COMPLETED | OUTPATIENT
Start: 2021-10-24 | End: 2021-10-24

## 2021-10-24 RX ORDER — NALOXONE HYDROCHLORIDE 1 MG/ML
2 INJECTION INTRAMUSCULAR; INTRAVENOUS; SUBCUTANEOUS ONCE
Status: COMPLETED | OUTPATIENT
Start: 2021-10-24 | End: 2021-10-24

## 2021-10-24 RX ADMIN — NALOXONE HYDROCHLORIDE 2 MG: 1 INJECTION PARENTERAL at 21:50

## 2021-10-24 RX ADMIN — ONDANSETRON 4 MG: 2 INJECTION INTRAMUSCULAR; INTRAVENOUS at 21:36

## 2022-04-11 ENCOUNTER — TELEPHONE (OUTPATIENT)
Dept: FAMILY MEDICINE CLINIC | Facility: CLINIC | Age: 27
End: 2022-04-11

## 2022-08-21 ENCOUNTER — TELEPHONE (OUTPATIENT)
Dept: OTHER | Facility: OTHER | Age: 27
End: 2022-08-21

## 2022-09-07 PROBLEM — N91.2 AMENORRHEA: Status: ACTIVE | Noted: 2022-09-07

## 2022-09-07 NOTE — PROGRESS NOTES
[address] [place] [address]   ULTRASOUND VISIT     SUBJECTIVE:  Brief HPI: Ezio Wooten is a 32 y o   female who presents for viability scan and dating for new pregnancy  Reports LMP was 22, giving MIO of 23 and current GA of 7w1d  Current symptoms: mild nausea in the morning, does not desire treatment  Has had vaginal discharge and itching with a smell  Patient has history of methamphetamine and heroine use  Patient states she has been clean for 7 weeks and is currently in a recovery house  Was previously using subutex, stopped going to the clinic because she did not want to pay for it  States that she has not had issues with withdrawal symptoms, and does not want treatment  OB History    Para Term  AB Living   2 1 1     1   SAB IAB Ectopic Multiple Live Births         0 1      # Outcome Date GA Lbr Tommy/2nd Weight Sex Delivery Anes PTL Lv   2 Current            1 Term 16 39w4d 11:45 / 00:26 3220 g (7 lb 1 6 oz) F Vag-Spont EPI N DAVONTE       Past Medical History:   Diagnosis Date    HSIL on Pap smear of cervix        No past surgical history on file  Review of Systems   Constitutional: Negative for chills and fever  Respiratory: Negative for cough and shortness of breath  Cardiovascular: Negative for chest pain and leg swelling  Gastrointestinal: Negative for abdominal pain, nausea and vomiting  Genitourinary: Negative for dysuria, pelvic pain, urgency, vaginal bleeding and vaginal discharge  Neurological: Negative for dizziness, light-headedness and headaches  All other systems reviewed and are negative  OBJECTIVE:  Vitals:    22 1326   BP: 123/80   Pulse: 65      Physical Exam  Vitals and nursing note reviewed  Constitutional:       General: She is not in acute distress  Appearance: She is well-developed  HENT:      Head: Normocephalic and atraumatic     Eyes:      Conjunctiva/sclera: Conjunctivae normal  Cardiovascular:      Rate and Rhythm: Normal rate and regular rhythm  Heart sounds: No murmur heard  Pulmonary:      Effort: Pulmonary effort is normal  No respiratory distress  Breath sounds: Normal breath sounds  Abdominal:      Palpations: Abdomen is soft  Tenderness: There is no abdominal tenderness  Musculoskeletal:      Cervical back: Neck supple  Skin:     General: Skin is warm and dry  Neurological:      Mental Status: She is alert  FIRST TRIMESTER OBSTETRIC ULTRASOUND  INDICATION: Amenorrhea, viability  COMPARISON: None  TECHNIQUE:   Transvaginal imaging was performed to assess the gestation, myometrial/endometrial architecture and ovarian parenchymal detail  The study includes volumetric sweeps and traditional still imaging technique  FINDINGS:  UTERUS:  Normal appearing uterus   No free fluid identified  A single intrauterine gestation is identified  Cervix appears normal      GESTATIONAL SAC:  Round, normal in appearance  AMNIOTIC FLUID/SAC SHAPE: Within expected normal range  FETAL POLE:  CRL:     1 78 mm   1 64 mm    1 68 mm   Average 1 68 = 8w1d    Cardiac activity is detected at 177 bpm      YOLK SAC:  Round, normal in appearance  ADNEXA:   Left ovary:   No adnexal mass or pathologic cyst       Right ovary:  No adnexal mass or pathologic cyst      IMPRESSION:  Single, viable IUP measuring 8w1d by this ultrasound  Single, viable IUP measuring 7w1d by LMP of 7/20/22   Fetal cardiac activity present and WNL  Uterus and adnexa normal in appearance      ASSESSMENT/PLAN:  Problem List Items Addressed This Visit        Other    Hx of preeclampsia, prior pregnancy, currently pregnant, first trimester    Amenorrhea - Primary    Relevant Orders    AMB US OB < 14 weeks single or first gestation level 1 (Completed)          IUP at 8w1d   - Will use ultrasound as final method of dating   - RTC for nursing intake and prenatal H&P  - Patient has been taking prenatal vitamin    -Return for nurse intake visit    Bacterial Vaginosis  -metronidazole 500mg daily for 7 days    Hx of preeclampsia  -recommend 162mg aspirin    Prema Craig MD  PGY-1, OBGYN  09/08/22

## 2022-09-08 ENCOUNTER — ULTRASOUND (OUTPATIENT)
Dept: OBGYN CLINIC | Facility: CLINIC | Age: 27
End: 2022-09-08

## 2022-09-08 VITALS
HEART RATE: 65 BPM | SYSTOLIC BLOOD PRESSURE: 123 MMHG | BODY MASS INDEX: 31.83 KG/M2 | WEIGHT: 168.6 LBS | DIASTOLIC BLOOD PRESSURE: 80 MMHG | HEIGHT: 61 IN

## 2022-09-08 DIAGNOSIS — N76.0 BV (BACTERIAL VAGINOSIS): ICD-10-CM

## 2022-09-08 DIAGNOSIS — B96.89 BV (BACTERIAL VAGINOSIS): ICD-10-CM

## 2022-09-08 DIAGNOSIS — O09.291 HX OF PREECLAMPSIA, PRIOR PREGNANCY, CURRENTLY PREGNANT, FIRST TRIMESTER: ICD-10-CM

## 2022-09-08 DIAGNOSIS — N91.2 AMENORRHEA: Primary | ICD-10-CM

## 2022-09-08 PROBLEM — Z87.898 HISTORY OF SUBSTANCE USE: Status: ACTIVE | Noted: 2022-09-08

## 2022-09-08 LAB
BV WHIFF TEST VAG QL: ABNORMAL
CLUE CELLS SPEC QL WET PREP: PRESENT
PH SMN: 4.5 [PH]
SL AMB POCT URINE HCG: POSITIVE
SL AMB POCT WET MOUNT: ABNORMAL
T VAGINALIS VAG QL WET PREP: ABNORMAL
YEAST VAG QL WET PREP: ABNORMAL

## 2022-09-08 PROCEDURE — 87491 CHLMYD TRACH DNA AMP PROBE: CPT

## 2022-09-08 PROCEDURE — 87591 N.GONORRHOEAE DNA AMP PROB: CPT

## 2022-09-08 PROCEDURE — 99214 OFFICE O/P EST MOD 30 MIN: CPT | Performed by: OBSTETRICS & GYNECOLOGY

## 2022-09-08 PROCEDURE — 76817 TRANSVAGINAL US OBSTETRIC: CPT | Performed by: OBSTETRICS & GYNECOLOGY

## 2022-09-08 PROCEDURE — 81025 URINE PREGNANCY TEST: CPT | Performed by: OBSTETRICS & GYNECOLOGY

## 2022-09-08 PROCEDURE — 87210 SMEAR WET MOUNT SALINE/INK: CPT | Performed by: OBSTETRICS & GYNECOLOGY

## 2022-09-08 RX ORDER — METRONIDAZOLE 500 MG/1
500 TABLET ORAL EVERY 8 HOURS SCHEDULED
Qty: 21 TABLET | Refills: 0 | Status: SHIPPED | OUTPATIENT
Start: 2022-09-08 | End: 2022-09-15

## 2022-09-10 LAB
C TRACH DNA SPEC QL NAA+PROBE: NEGATIVE
N GONORRHOEA DNA SPEC QL NAA+PROBE: NEGATIVE

## 2022-09-27 ENCOUNTER — PATIENT OUTREACH (OUTPATIENT)
Dept: OBGYN CLINIC | Facility: CLINIC | Age: 27
End: 2022-09-27

## 2022-09-27 ENCOUNTER — INITIAL PRENATAL (OUTPATIENT)
Dept: OBGYN CLINIC | Facility: CLINIC | Age: 27
End: 2022-09-27

## 2022-09-27 VITALS
DIASTOLIC BLOOD PRESSURE: 75 MMHG | HEART RATE: 69 BPM | SYSTOLIC BLOOD PRESSURE: 120 MMHG | BODY MASS INDEX: 32.47 KG/M2 | WEIGHT: 172 LBS | HEIGHT: 61 IN

## 2022-09-27 DIAGNOSIS — Z3A.10 10 WEEKS GESTATION OF PREGNANCY: ICD-10-CM

## 2022-09-27 DIAGNOSIS — Z34.91 PRENATAL CARE IN FIRST TRIMESTER: Primary | ICD-10-CM

## 2022-09-27 PROCEDURE — 3725F SCREEN DEPRESSION PERFORMED: CPT

## 2022-09-27 PROCEDURE — 99211 OFF/OP EST MAY X REQ PHY/QHP: CPT

## 2022-09-27 NOTE — PROGRESS NOTES
SONG METZ was referred by Dr Luis Meléndez for a PN SW assessment in the first trimester  This is a  expectant mother with MIO of 2023, she is accompanied today by her s/o Denisse Dan -     Pt reports this pregnancy is unplanned but welcomed  This is the second child for the patient, the first child for FOB  They do not currently live together but are interested in moving in together at some point  Pt currently is living in a recovery home in the [facility] [place] [facility]  Pt reports she has not told her family of the pregnancy yet, they are currently caring for her 7yo daughter and she wants to tell them after she has her own place to live so they don't feel like they are raising two of her children  Pt reports sobriety for 8 months  Hx of opoid use, pt reports she was maintained on Sublocade monthly through [facility] however she had a lapse in insurance and missed her last shot  Pt denies any cravings to use opioids and reports that she wishes to discontinue any MAT especially since she is pregnant and does not want her child to go through [facility]  Pt has a CRS through [facility] and feels supported through her family  Pt reports that once her sister provides her with her SS card she plans apply for the [facility]  housing program through conf of [employer] and [employer]  FOB reports he has been in recovery for six months  Pt reports that she has an open CYS case through [facility] and that her eldest child is currently with family as she focuses on recovery  Pt reports that the active goal at this time is that once she is able to get her own apartment that she will be re-united with her 7yo daughter  She is hopeful to live in [facility] and not go back to [address] [place] [address] Rd [address] is agreeable to this plan       Pt reports she had to re-apply for MA, she will have [employer] on 10/01, pt has SNAP, she plans to apply for [address] - she wasn't sure where to apply, SONG METZ provided her with the infant resource list  Pt takes the bus to appointments  Pt denies any MH  SONG CM did not ask about IPV today as partner was present  SONG CM will place patient as survelliance and f/u in two months

## 2022-09-27 NOTE — PATIENT INSTRUCTIONS
WARNING SIGNS DURING PREGNANCY  Call our office at [phone] for any of the followin  Vaginal bleeding  2  Sharp abdominal pain that does not go away  3  Fever (more than 100 4 and is not relieved by Tylenol)  4  Persistent vomiting lasting greater than 24 hours  5  Chest pain   6  Pain or burning when you urinate  7  Severe headache that doesn't resolve with Tylenol  8  Blurred vision or seeing spots in your vision  9  Sudden swelling of your face or hands  10  Redness, swelling or pain in a leg  11  A sudden weight gain in just a few days  12  Decrease in your baby's movement (after 28 weeks or the 6th month of pregnancy)  13  A loss of watery fluid from your vagina - can be a gush, a trickle or continuous wetness  14   After 20 weeks of pregnancy, rhythmic cramping (greater than 4 per hour) or menstrual like low/pelvic pain

## 2022-09-27 NOTE — LETTER
9/27/2022      This letter is to confirm that Elliott Never is pregnant and is under our care  Her Estimated Date of Delivery: 4/19/23  If you have any questions or concerns, please contact our office    Thank you,    DESMOND Silva

## 2022-09-27 NOTE — PROGRESS NOTES
OBSTETRIC INTAKE VISIT    Kirk Meléndez presents today for initial OB visit and intake at 10w6d  LMP 22  History obtained from patient and she reports it as follows:    Past Medical History:   Diagnosis Date    Asthma     took meds when younger    Depression     not on any meds now    HSIL on Pap smear of cervix     Preeclampsia     last pregnancy     History reviewed  No pertinent surgical history  OB History    Para Term  AB Living   2 1 1 0 0 1   SAB IAB Ectopic Multiple Live Births   0 0 0 0 1      # Outcome Date GA Lbr Tommy/2nd Weight Sex Delivery Anes PTL Lv   2 Current            1 Term 16 39w4d 11:45 / 00:26 3220 g (7 lb 1 6 oz) F Vag-Spont EPI N DAVONTE     Social History     Tobacco Use    Smoking status: Former Smoker     Packs/day: 0 25    Smokeless tobacco: Never Used   Vaping Use    Vaping Use: Never used   Substance Use Topics    Alcohol use: No    Drug use: Not Currently     Types: Heroin       Current Outpatient Medications   Medication Instructions    Prenatal Vit-Fe Fumarate-FA (PRENATAL VITAMINS PO) Oral       No Known Allergies    Vitals: /75   Pulse 69   Ht 5' 1" (1 549 m)   Wt 78 kg (172 lb)   LMP 10/15/2021 (Approximate)   BMI 32 50 kg/m²     Review of Systems:  Denies vaginal bleeding or leaking fluid  Denies abdominal/pelvic pain or contractions  Plan:  1  OB labwork ordered today to include Prenatal Panel, HCV antibody, Hgb fractionation cascade, Prenatal Carrier Screen Panel  Other labs include Glucose 1H PG,Comprehensive Metabolic Panel,Protein/Creatine Ratio Urine  Advised patient to have drawn within the next few days  2  Next ultrasound is scheduled for 10/17/22  3  Return in 2 weeks for H&P prenatal visit  4  Referrals placed for [facility] Ainsley Guzm[facility], , and [facility]  5  Given vaccines: None due  6  Patient's depression screening was assessed with a PHQ-2 score of 0  Their PHQ-9 score was 1   Clinically patient does not have depression  No treatment is required   in to see patient    7  Reviewed the following educational topics with patient:   -routine prenatal visit/ultrasound/labwork schedule   -hospital for delivery and office phone/answering service contact information   -nutritional demands of pregnancy, healthy dietary habits   -listeria, toxoplasmosis, seafood precautions   -weight gain expectations (based on pre-pregnant BMI)   -exercise, rest, and sexual activity during pregnancy   -abstinence from alcohol, tobacco, and illegal drugs   -common discomforts of pregnancy and appropriate management   -OTC medications safe to use in pregnancy   -genetic screening options   -vaccines in pregnancy   -symptoms to report to OB provider    -signs of PTL and pre-eclampsia    -vaginal bleeding/leaking of fluid    -severe n/v-unable to tolerate ANY food/fluids for more than 24 hours

## 2022-09-27 NOTE — LETTER
[facility] REFERRAL      Date: 9/27/2022    Patient name: Mely Rob    YOB: 1995    Estimated Date of Delivery: 4/19/23      /75   Pulse 69   Ht 5' 1" (1 549 m)   Wt 78 kg (172 lb)   LMP 10/15/2021 (Approximate)   BMI 32 50 kg/m²         Thank you,  Dejah Cook, [address] [place] [address] locations:   1  [facility] and [address]       [address]       Phone: [phone]       Fax: [phone]     2   [address]       [address]       Phone: [phone]       Fax: [phone]

## 2022-10-11 ENCOUNTER — INITIAL PRENATAL (OUTPATIENT)
Dept: OBGYN CLINIC | Facility: CLINIC | Age: 27
End: 2022-10-11

## 2022-10-11 VITALS
DIASTOLIC BLOOD PRESSURE: 79 MMHG | HEART RATE: 64 BPM | SYSTOLIC BLOOD PRESSURE: 124 MMHG | WEIGHT: 176 LBS | BODY MASS INDEX: 33.23 KG/M2 | HEIGHT: 61 IN

## 2022-10-11 DIAGNOSIS — Z34.91 PRENATAL CARE IN FIRST TRIMESTER: Primary | ICD-10-CM

## 2022-10-11 DIAGNOSIS — Z3A.12 12 WEEKS GESTATION OF PREGNANCY: ICD-10-CM

## 2022-10-11 DIAGNOSIS — Z87.59 HISTORY OF GESTATIONAL HYPERTENSION: ICD-10-CM

## 2022-10-11 DIAGNOSIS — O99.211 OBESITY AFFECTING PREGNANCY IN FIRST TRIMESTER: ICD-10-CM

## 2022-10-11 PROBLEM — N91.2 AMENORRHEA: Status: RESOLVED | Noted: 2022-09-07 | Resolved: 2022-10-11

## 2022-10-11 PROBLEM — Z87.898 HISTORY OF SUBSTANCE USE: Status: RESOLVED | Noted: 2022-09-08 | Resolved: 2022-10-11

## 2022-10-11 PROBLEM — O99.210 OBESITY AFFECTING PREGNANCY: Status: ACTIVE | Noted: 2022-10-11

## 2022-10-11 PROCEDURE — 87591 N.GONORRHOEAE DNA AMP PROB: CPT | Performed by: NURSE PRACTITIONER

## 2022-10-11 PROCEDURE — G0124 SCREEN C/V THIN LAYER BY MD: HCPCS | Performed by: PATHOLOGY

## 2022-10-11 PROCEDURE — 99214 OFFICE O/P EST MOD 30 MIN: CPT | Performed by: NURSE PRACTITIONER

## 2022-10-11 PROCEDURE — 87491 CHLMYD TRACH DNA AMP PROBE: CPT | Performed by: NURSE PRACTITIONER

## 2022-10-11 PROCEDURE — G0145 SCR C/V CYTO,THINLAYER,RESCR: HCPCS | Performed by: PATHOLOGY

## 2022-10-11 RX ORDER — ASPIRIN 81 MG/1
162 TABLET ORAL DAILY
Qty: 60 TABLET | Refills: 10 | Status: SHIPPED | OUTPATIENT
Start: 2022-10-11

## 2022-10-11 NOTE — PROGRESS NOTES
Olimpia James presents today for H&P OB visit at [address]  ZB=490/79  Wt=79 8 kg (176 lb); Body mass index is 33 25 kg/m² ; TWG=7 258 kg (16 lb)  Fetal Heart Rate: 154  Abdomen: soft, non-tender  She reports no complaints  Denies vaginal bleeding or leaking of fluid  Pap/GC/CT collected today  She has not had her OB labwork performed yet  Will go to lab directly after today's visit  Scheduled for ultrasound 10/17/22  Reviewed common discomforts of pregnancy in first trimester and warning signs  Advised to continue medications and return in 4 weeks        Current Outpatient Medications   Medication Instructions   • aspirin (ECOTRIN LOW STRENGTH) 162 mg, Oral, Daily   • Prenatal Vit-Fe Fumarate-FA (PRENATAL VITAMINS PO) Oral         G2 Problems (from 22 to present)     Problem Noted Resolved    History of gestational hypertension 10/11/2022 by DESMOND Katz No    Overview Signed 10/11/2022  2:53 PM by DESMOND Katz     Needs baseline PEC labs  Needs LDASA tx         Obesity affecting pregnancy 10/11/2022 by DESMOND Katz No    Overview Signed 10/11/2022  2:54 PM by DESMOND Katz     Prepregnant BMI=30 25  Needs early GDM screen  Serial growth scans               Past Medical History:   Diagnosis Date   • Abnormal Pap smear of cervix     2016 abn pap, colpo WNL, cryotherapy   • Asthma    • Depression    • Preeclampsia 2016     Past Surgical History:   Procedure Laterality Date   • GYNECOLOGIC CRYOSURGERY  2016     OB History        2    Para   1    Term   1       0    AB   0    Living   1       SAB   0    IAB   0    Ectopic   0    Multiple   0    Live Births   1               Social History     Tobacco Use   • Smoking status: Former Smoker     Types: Cigarettes     Quit date: 2022     Years since quittin 4   • Smokeless tobacco: Never Used   Vaping Use   • Vaping Use: Never used   Substance Use Topics   • Alcohol use: Not Currently     Comment: last used 12/2021   • Drug use: Not Currently     Types: Heroin, Methamphetamines     Comment: last used 1/28/2022

## 2022-10-11 NOTE — PATIENT INSTRUCTIONS
Thank you for your confidence in our team    We appreciate you and welcome your feedback  If you receive a survey from us, please take a few moments to let us know how we are doing     Sincerely,  Linda Bravo

## 2022-10-13 LAB
C TRACH DNA SPEC QL NAA+PROBE: NEGATIVE
N GONORRHOEA DNA SPEC QL NAA+PROBE: NEGATIVE

## 2022-10-17 ENCOUNTER — ROUTINE PRENATAL (OUTPATIENT)
Dept: PERINATAL CARE | Facility: OTHER | Age: 27
End: 2022-10-17
Payer: COMMERCIAL

## 2022-10-17 ENCOUNTER — TELEPHONE (OUTPATIENT)
Dept: OBGYN CLINIC | Facility: CLINIC | Age: 27
End: 2022-10-17

## 2022-10-17 VITALS
SYSTOLIC BLOOD PRESSURE: 114 MMHG | BODY MASS INDEX: 31.83 KG/M2 | HEIGHT: 61 IN | WEIGHT: 168.6 LBS | HEART RATE: 95 BPM | DIASTOLIC BLOOD PRESSURE: 82 MMHG

## 2022-10-17 DIAGNOSIS — N91.2 AMENORRHEA: ICD-10-CM

## 2022-10-17 DIAGNOSIS — O99.211 MATERNAL OBESITY, ANTEPARTUM, FIRST TRIMESTER: ICD-10-CM

## 2022-10-17 DIAGNOSIS — Z36.82 ENCOUNTER FOR ANTENATAL SCREENING FOR NUCHAL TRANSLUCENCY: Primary | ICD-10-CM

## 2022-10-17 DIAGNOSIS — Z3A.13 13 WEEKS GESTATION OF PREGNANCY: ICD-10-CM

## 2022-10-17 PROBLEM — R87.619 ABNORMAL PAP SMEAR OF CERVIX: Status: ACTIVE | Noted: 2022-10-17

## 2022-10-17 LAB
LAB AP GYN PRIMARY INTERPRETATION: ABNORMAL
Lab: ABNORMAL
PATH INTERP SPEC-IMP: ABNORMAL

## 2022-10-17 PROCEDURE — 76813 OB US NUCHAL MEAS 1 GEST: CPT | Performed by: OBSTETRICS & GYNECOLOGY

## 2022-10-17 PROCEDURE — 99241 PR OFFICE CONSULTATION NEW/ESTAB PATIENT 15 MIN: CPT | Performed by: OBSTETRICS & GYNECOLOGY

## 2022-10-17 NOTE — TELEPHONE ENCOUNTER
I called patient and advised her of HSIL pap smear results and need for colposcopy  She verbalizes understanding  She reports that she DID receive full Gardasil vaccination series in her childhood

## 2022-10-17 NOTE — PROGRESS NOTES
Patient chose to have [facility] Non-invasive Prenatal Screen with fetal sex  Patient given brochure and is aware [facility] will contact patients insurance and coordinate coverage  Patient made aware she will need to respond to text message or e-mail from [employer] within 2 business days or testing will be run through insurance  Patient informed text message will come from area code  "415"  Provided [employer] # [phone] and web site : [email or web address] [email or web address]     2 vials of blood were attempted in right arm without success  Patient was provided with an [facility] kit to have blood work drawn at any laboratory  Test tubes labeled with patient identifiers (name and date of birth)  Order and test tubes were verified with patient  [facility] packing instructions provided inside kit for when blood is drawn  Copy of lab order scanned to Epic media  Maternal Fetal Medicine will have results in approximately 7-10 business days and will call patient or notify via [address]  Patient aware viewing lab result online will reveal fetal sex if ordered  Patient verbalized understanding of all instructions and no questions at this time

## 2022-10-17 NOTE — LETTER
October 17, 2022     [place] [address] [facility] [address]    Patient: Anayeli Gallagher   YOB: 1995   Date of Visit: 10/17/2022       Dear Dr Laron Mckay:    Thank you for referring Rafal Loya to me for evaluation  Below are my notes for this consultation  If you have questions, please do not hesitate to call me  I look forward to following your patient along with you  Sincerely,        Cain Goldmann, MD        CC: No Recipients  Cain Goldmann, MD  10/15/2022  6:50 PM  Sign when Signing Visit  Please refer to the [facility] ultrasound report in Ob Procedures for additional information regarding today's visit

## 2022-10-20 ENCOUNTER — TELEPHONE (OUTPATIENT)
Dept: OBGYN CLINIC | Facility: CLINIC | Age: 27
End: 2022-10-20

## 2022-10-25 ENCOUNTER — TELEPHONE (OUTPATIENT)
Dept: PERINATAL CARE | Facility: OTHER | Age: 27
End: 2022-10-25

## 2022-10-25 NOTE — TELEPHONE ENCOUNTER
Left patient a message that her detailed M appointment had to be rescheduled to scan/send for 12/14 @ 8am, [facility] office  The new time, date and location were provided  The patient has been instructed to please call us back at [phone] with any questions or concerns

## 2022-10-31 ENCOUNTER — TELEPHONE (OUTPATIENT)
Dept: OBGYN CLINIC | Facility: CLINIC | Age: 27
End: 2022-10-31

## 2022-10-31 ENCOUNTER — HOSPITAL ENCOUNTER (EMERGENCY)
Facility: HOSPITAL | Age: 27
Discharge: HOME | End: 2022-10-31
Attending: EMERGENCY MEDICINE

## 2022-10-31 VITALS
OXYGEN SATURATION: 98 % | RESPIRATION RATE: 20 BRPM | WEIGHT: 174.38 LBS | TEMPERATURE: 98.3 F | DIASTOLIC BLOOD PRESSURE: 68 MMHG | BODY MASS INDEX: 32.95 KG/M2 | SYSTOLIC BLOOD PRESSURE: 117 MMHG | HEART RATE: 67 BPM

## 2022-10-31 DIAGNOSIS — O26.899 ABDOMINAL PAIN DURING PREGNANCY, ANTEPARTUM: Primary | ICD-10-CM

## 2022-10-31 DIAGNOSIS — R10.9 ABDOMINAL PAIN DURING PREGNANCY, ANTEPARTUM: Primary | ICD-10-CM

## 2022-10-31 LAB
BACTERIA UR QL AUTO: ABNORMAL /HPF
BILIRUB UR QL STRIP: NEGATIVE
CLARITY UR: CLEAR
COLOR UR: ABNORMAL
GLUCOSE UR STRIP-MCNC: NEGATIVE MG/DL
HGB UR QL STRIP.AUTO: NEGATIVE
KETONES UR STRIP-MCNC: ABNORMAL MG/DL
LEUKOCYTE ESTERASE UR QL STRIP: 25
MUCOUS THREADS UR QL AUTO: ABNORMAL
NITRITE UR QL STRIP: NEGATIVE
NON-SQ EPI CELLS URNS QL MICRO: ABNORMAL /HPF
PH UR STRIP.AUTO: 6.5 [PH]
PROT UR STRIP-MCNC: ABNORMAL MG/DL
RBC #/AREA URNS AUTO: ABNORMAL /HPF
SP GR UR STRIP.AUTO: 1.02 (ref 1–1.04)
UROBILINOGEN UA: 4 MG/DL
WBC #/AREA URNS AUTO: ABNORMAL /HPF

## 2022-10-31 RX ORDER — CEPHALEXIN 500 MG/1
500 CAPSULE ORAL EVERY 8 HOURS SCHEDULED
Qty: 12 CAPSULE | Refills: 0 | Status: SHIPPED | OUTPATIENT
Start: 2022-10-31 | End: 2022-11-04

## 2022-10-31 NOTE — TELEPHONE ENCOUNTER
Pt called office with concern, transferred to my phone for triage  Stated she wants to come in and have the baby checked  Pt has appointment scheduled in about a week  Pt hung up without disclosing any symptoms

## 2022-10-31 NOTE — Clinical Note
Annalise Lam was seen and treated in our emergency department on 10/31/2022  No restrictions    ? ? Diagnosis: ?    Raz Carbajal  ? Gil Gonzalez She may return on this date: 11/01/2022    ? If you have any questions or concerns, please don't hesitate to call        Mik Daugherty PA-C    ______________________________           _______________          _______________  Hospital Representative                              Date                                Time
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
## 2022-11-01 LAB — BACTERIA UR CULT: NORMAL

## 2022-11-08 ENCOUNTER — ROUTINE PRENATAL (OUTPATIENT)
Dept: OBGYN CLINIC | Facility: CLINIC | Age: 27
End: 2022-11-08

## 2022-11-08 VITALS
BODY MASS INDEX: 32.7 KG/M2 | DIASTOLIC BLOOD PRESSURE: 70 MMHG | SYSTOLIC BLOOD PRESSURE: 111 MMHG | WEIGHT: 173.2 LBS | HEIGHT: 61 IN | HEART RATE: 62 BPM

## 2022-11-08 DIAGNOSIS — R87.613 HSIL (HIGH GRADE SQUAMOUS INTRAEPITHELIAL LESION) ON PAP SMEAR OF CERVIX: ICD-10-CM

## 2022-11-08 DIAGNOSIS — Z3A.16 16 WEEKS GESTATION OF PREGNANCY: ICD-10-CM

## 2022-11-08 DIAGNOSIS — Z34.92 PRENATAL CARE IN SECOND TRIMESTER: Primary | ICD-10-CM

## 2022-11-08 PROBLEM — Z3A.12 12 WEEKS GESTATION OF PREGNANCY: Status: RESOLVED | Noted: 2022-10-11 | Resolved: 2022-11-08

## 2022-11-08 PROBLEM — Z30.09 BIRTH CONTROL COUNSELING: Status: ACTIVE | Noted: 2022-11-08

## 2022-11-08 NOTE — PROGRESS NOTES
Preston Kim presents today for routine OB visit at [place] [address]  Blood Pressure: 111/70  Wt=78 6 kg (173 lb 3 2 oz); Body mass index is 32 73 kg/m² ; TWG=5 987 kg (13 lb 3 2 oz)  Fetal Heart Rate: 146;    Abdomen: gravid, soft, non-tender  She reports no complaints  Denies uterine contractions or persistent cramping  Denies vaginal bleeding or leaking of fluid  Reports fetal movement  Scheduled for ultrasound 12/14/22  Pt aware she needs to get her blood work done  Flu vaccine today  Reviewed common discomforts of pregnancy in second trimester and warning signs  Advised to continue medications and return in 2 weeks        Current Outpatient Medications   Medication Instructions   • aspirin (ECOTRIN LOW STRENGTH) 162 mg, Oral, Daily   • Prenatal Vit-Fe Fumarate-FA (PRENATAL VITAMINS PO) Oral         G2 Problems (from 09/08/22 to present)     Problem Noted Resolved    Birth control counseling 11/8/2022 by Manjula Sanabria MD No    Overview Signed 11/8/2022 12:51 PM by Manjula Sanabria MD     Considering Mirena IUD, does not want weight gain         Abnormal Pap smear of cervix 10/17/2022 by DESMOND Gurrola No    Overview Signed 10/17/2022  3:35 PM by DESMOND Gurrola     HSIL pap  Needs colposcopy         History of gestational hypertension 10/11/2022 by DESMOND Gurrola No    Overview Signed 10/11/2022  2:53 PM by DESMOND Gurrola     Needs baseline PEC labs  Needs LDASA tx         Obesity affecting pregnancy 10/11/2022 by DESMOND Gurrola No    Overview Signed 10/11/2022  2:54 PM by DESMOND Gurrola     Prepregnant BMI=30 25  Needs early GDM screen  Serial growth scans

## 2022-11-30 ENCOUNTER — HOSPITAL ENCOUNTER (EMERGENCY)
Facility: HOSPITAL | Age: 27
Discharge: HOME | End: 2022-11-30
Attending: INTERNAL MEDICINE

## 2022-11-30 VITALS
OXYGEN SATURATION: 98 % | DIASTOLIC BLOOD PRESSURE: 63 MMHG | SYSTOLIC BLOOD PRESSURE: 92 MMHG | BODY MASS INDEX: 32.7 KG/M2 | RESPIRATION RATE: 18 BRPM | WEIGHT: 173.06 LBS | TEMPERATURE: 97.7 F | HEART RATE: 60 BPM

## 2022-11-30 DIAGNOSIS — J06.9 URI (UPPER RESPIRATORY INFECTION): Primary | ICD-10-CM

## 2022-11-30 NOTE — ED PROVIDER NOTES
HPI: Patient is a 32 y o  female who presents with 2 days of cough, sore throat and congestion which the patient describes at moderate  The patient has had contact with people with similar symptoms  Her daughter has the same symptoms  The patient has not taken any medication  Patient is currently 20 weeks pregnant  She denies any abdominal discomfort, uterine cramping, vaginal discharge or vaginal bleeding  She has obgyn follow up  No Known Allergies    Past Medical History:   Diagnosis Date   • Abnormal Pap smear of cervix     2016 abn pap, colpo WNL, cryotherapy; 10/2022 HSIL pap   • Asthma    • Depression    • Preeclampsia 2016      Past Surgical History:   Procedure Laterality Date   • GYNECOLOGIC CRYOSURGERY  2016     Social History     Tobacco Use   • Smoking status: Former     Types: Cigarettes     Quit date: 2022     Years since quittin 5   • Smokeless tobacco: Never   Vaping Use   • Vaping Use: Never used   Substance Use Topics   • Alcohol use: Not Currently     Comment: last used 2021   • Drug use: Not Currently     Types: Heroin, Methamphetamines     Comment: last used 2022       Nursing notes reviewed  Physical Exam:  ED Triage Vitals   Temperature Pulse Respirations Blood Pressure SpO2   224 22 18222 18222 1830 22   97 7 °F (36 5 °C) 60 18 92/63 98 %      Temp Source Heart Rate Source Patient Position - Orthostatic VS BP Location FiO2 (%)   22 -- -- --   Oral Monitor         Pain Score       --                  ROS: Positive for cough, sore throat and congestion, the remainder of a 10 organ system ROS was otherwise unremarkable    General: awake, alert, no acute distress    Head: normocephalic, atraumatic    Eyes: no scleral icterus  Ears: external ears normal, hearing grossly intact  Nose: external exam grossly normal, positive nasal discharge  Neck: symmetric, No JVD noted, trachea midline  Pulmonary: no respiratory distress, no tachypnea noted, lungs clear to auscultation bilaterally  Cardiovascular: appears well perfused, regular rate and rhythm  Abdomen: gravid  Musculoskeletal: no deformities noted, tone normal  Neuro: grossly non-focal  Psych: mood and affect appropriate    The patient is stable and has a history and physical exam consistent with a viral illness  COVID19 testing has been performed  I considered the patient's other medical conditions as applicable/noted above in my medical decision making  The patient is stable upon discharge  The plan is for supportive care at home  The patient (and any family present) verbalized understanding of the discharge instructions and warnings that would necessitate return to the Emergency Department  All questions were answered prior to discharge  Medications - No data to display  Final diagnoses:   URI (upper respiratory infection)     Time reflects when diagnosis was documented in both MDM as applicable and the Disposition within this note     Time User Action Codes Description Comment    11/30/2022  6:40 PM Jaziel Ambrose Add [J06 9] URI (upper respiratory infection)       ED Disposition     ED Disposition   Discharge    Condition   Stable    Date/Time   Wed Nov 30, 2022  6:40 PM    Comment   [address] discharge to home/self care  Follow-up Information     Follow up With Specialties Details Why Contact Doroteo Erazo, [address] [place], Nurse Practitioner Call  As needed [address] [place] [address]  [phone]          Patient's Medications   Discharge Prescriptions    No medications on file     No discharge procedures on file      Electronically Signed by       Emma Freeman MD  11/30/22 4618

## 2022-12-01 LAB
FLUAV RNA RESP QL NAA+PROBE: POSITIVE
FLUBV RNA RESP QL NAA+PROBE: NEGATIVE
SARS-COV-2 RNA RESP QL NAA+PROBE: NEGATIVE

## 2022-12-01 NOTE — PROGRESS NOTES
Colposcopy     Date/Time 12/1/2022 11:46 AM     Universal Protocol   Consent: Verbal consent obtained  Risks and benefits: risks, benefits and alternatives were discussed  Consent given by: patient  Time out: Immediately prior to procedure a "time out" was called to verify the correct patient, procedure, equipment, support staff and site/side marked as required  Patient understanding: patient states understanding of the procedure being performed  Patient consent: the patient's understanding of the procedure matches consent given  Patient identity confirmed: verbally with patient       Performed by  Edda Reeder MD     Authorized by Edda Reeder MD        Pre-procedure details     Pre-procedure timeout performed: yes      Prepped with: acetic acid and Lugol       Indication    HSIL     Procedure Details   Procedure: Colposcopy only      Under satisfactory analgesia the patient was prepped and draped in the dorsal lithotomy position: yes      Hickory speculum was placed in the vagina: yes      Under colposcopic examination the transition zone was seen in entirety: yes      Monsel's solution was applied: no      Biopsy(s): no       Post-procedure      Findings: White epithelium      Impression: Low grade cervical dysplasia      Patient tolerance of procedure:   Tolerated well, no immediate complications     Comments

## 2022-12-06 ENCOUNTER — PROCEDURE VISIT (OUTPATIENT)
Dept: OBGYN CLINIC | Facility: CLINIC | Age: 27
End: 2022-12-06

## 2022-12-06 VITALS
HEART RATE: 68 BPM | SYSTOLIC BLOOD PRESSURE: 122 MMHG | HEIGHT: 61 IN | BODY MASS INDEX: 33.76 KG/M2 | DIASTOLIC BLOOD PRESSURE: 75 MMHG | WEIGHT: 178.8 LBS

## 2022-12-06 DIAGNOSIS — Z3A.20 20 WEEKS GESTATION OF PREGNANCY: ICD-10-CM

## 2022-12-06 DIAGNOSIS — R87.613 HGSIL (HIGH GRADE SQUAMOUS INTRAEPITHELIAL LESION) ON PAP SMEAR OF CERVIX: Primary | ICD-10-CM

## 2023-01-03 ENCOUNTER — TELEPHONE (OUTPATIENT)
Dept: OBGYN CLINIC | Facility: CLINIC | Age: 28
End: 2023-01-03

## 2023-01-03 PROBLEM — Z3A.24 24 WEEKS GESTATION OF PREGNANCY: Status: ACTIVE | Noted: 2023-01-03

## 2023-01-04 ENCOUNTER — ROUTINE PRENATAL (OUTPATIENT)
Dept: OBGYN CLINIC | Facility: CLINIC | Age: 28
End: 2023-01-04

## 2023-01-04 VITALS
WEIGHT: 178.2 LBS | BODY MASS INDEX: 33.67 KG/M2 | HEART RATE: 65 BPM | SYSTOLIC BLOOD PRESSURE: 114 MMHG | DIASTOLIC BLOOD PRESSURE: 72 MMHG

## 2023-01-04 DIAGNOSIS — Z3A.25 25 WEEKS GESTATION OF PREGNANCY: ICD-10-CM

## 2023-01-04 DIAGNOSIS — Z34.92 PRENATAL CARE IN SECOND TRIMESTER: Primary | ICD-10-CM

## 2023-01-04 NOTE — PROGRESS NOTES
OB/GYN prenatal visit    S: 32 y o  Ewelina May 25w0d here for PN visit  She denies pelvic pain, contractions, vaginal bleeding, loss of fluid, and reports good fetal movement  O:  Vitals:    23 1554   BP: 114/72   Pulse: 65       Gen: no acute distress, nonlabored breathing  FHR: 140 BPM    A/P:     Problem List        Other    History of gestational hypertension    Overview     Needs baseline PEC labs; emphasized importance of completing these   Continue LDASA tx         Obesity affecting pregnancy    Overview     Prepregnant BMI=30 25  Needs early GDM screen; emphasized importance of completing  Serial growth scans; patient first needs L2 U/S for anatomy/growth         Abnormal Pap smear of cervix    Overview     HSIL pap   Colpo on 22 w/ white epithelium  Impression: low grade cervical dysplasia    Repeat postpartum           Birth control counseling    Overview     Considering Mirena IUD, does not want weight gain         25 weeks gestation of pregnancy    Overview     Level II not previously completed, scheduled for 22  Flu vaccine 22  Discussed  labor precautions and fetal kick counts    Continue prenatal vitamins   Return to clinic in 4 weeks     Prenatal lab slip and 1 hour glucose requisitions printed for patient on 23 since she has not previously completed-patient states that she is a hard stick and when she previously attempted blood work, she made them stop after two attempts  She is willing to rain again      [ ] Will need 28 week teaching, Rhogam, TDAP, and delivery consent at next visit          Other Visit Diagnoses     Prenatal care in second trimester    -  Primary              Leo Reyes MD  2023  4:12 PM

## 2023-01-17 ENCOUNTER — TELEPHONE (OUTPATIENT)
Dept: OBGYN CLINIC | Facility: CLINIC | Age: 28
End: 2023-01-17

## 2023-01-17 ENCOUNTER — HOSPITAL ENCOUNTER (OUTPATIENT)
Facility: HOSPITAL | Age: 28
Discharge: HOME | End: 2023-01-17
Attending: OBSTETRICS & GYNECOLOGY | Admitting: OBSTETRICS & GYNECOLOGY

## 2023-01-17 VITALS
OXYGEN SATURATION: 100 % | HEART RATE: 73 BPM | WEIGHT: 171.74 LBS | SYSTOLIC BLOOD PRESSURE: 99 MMHG | BODY MASS INDEX: 32.45 KG/M2 | RESPIRATION RATE: 18 BRPM | DIASTOLIC BLOOD PRESSURE: 49 MMHG | TEMPERATURE: 98.6 F

## 2023-01-17 PROBLEM — Z3A.26 26 WEEKS GESTATION OF PREGNANCY: Status: ACTIVE | Noted: 2023-01-03

## 2023-01-17 PROBLEM — O36.8120 DECREASED FETAL MOVEMENTS IN SECOND TRIMESTER: Status: ACTIVE | Noted: 2023-01-17

## 2023-01-17 NOTE — PROGRESS NOTES
L&D Triage Note - OB/GYN  Vaughn Litten Rex 32 y o  female MRN: [id number]  Unit/Bed#: L&D 328-01 Encounter: [id number]      ASSESSMENT:    Indu Melgoza is a 32 y o  Peyton Pounds at 26w6d who presents from home with decreased fetal movement  Movement returned in triage  JUAN adequate  NST reactive  Stable for discharge  PLAN:    1) Decreased fetal movement  - NST reactive, 140bpm baseline  - JUAN 12 73cm  - Counseled on fetal kick counts  - SVE deferred    2) H/o gestational hypertension  - Currently not taking aspirin  - Recommended restarting aspirin 162mg qd supplementation for preeclampsia prevention until 36w  - Patient expressed understanding and amenable to treatment plan    3) Discharge from [facility] triage with pre-term labor precautions    - Reviewed rupture of membranes, false vs true labor, decreased fetal movement, and vaginal bleeding   - Pt to call provider with any concerns and follow up at her next scheduled prenatal appointment   - Continue routine prenatal care   - Case discussed with Dr Allen Barraza:    Vaughn Litten Rex 32 y o  Peyton Pounds at [place] [address] with an Estimated Date of Delivery: 4/19/23 presented to the hospital complaining of decreased fetal movements since the morning  The patient reports that since she woke up at around 7 am she was not able to feel any fetal movements ( kicks)  Also, she have been experiencing abdominal discomfort  Unable to further explain the nature of discomfort  3 hours ago she took a shower and since then baby movements/kicks have been back to normal baseline per the patient's counting  Currently she doesn't have any active complaints  She seems a little bit worried about the incident  Ofnote: PMH is remarkable for pre eclampsia with her previous pregnancy  She was prescribed aspirin on 10-22 to be taken daily  The patient reports stopping the medication abruptly without medical advice around 1 week ago       Otherwise, no history of recent trauma, injuries or falls involving her abdomen  No chest pain or tightness, SOB or cough, dizziness or light headedness, N/V, Diarrhea or constipation  No active urinary symptoms  Tolerating oral diet  Her current obstetrical history is significant for elevated BMI    Her past obstetrical history is significant for h/o GHTN    Contractions: Paresh-Reed  Leakage of fluid: denies  Vaginal Bleeding: denies  Fetal movement: present    OBJECTIVE:    Vitals:    01/17/23 1801   BP: (!) 99/49   Pulse: 73   Resp: 18   Temp: 98 6 °F (37 °C)   SpO2: 100%       ROS:  Constitutional: Negative  Respiratory: Negative  Cardiovascular: Negative    Gastrointestinal: Negative    General Physical Exam:  General: in no apparent distress  Cardiovascular: intact distals pulses, no lower extremity edema    Lungs: non-labored breathing  Abdomen: abdomen is soft without significant tenderness, masses, organomegaly or guarding  Lower extremeties: nontender, b/l Janae's sign negative      Fetal monitoring:  FHT:  140 bpm/ Moderate 6 - 25 bpm / 10 x 10 accelerations present, no decelerations  Oliver: contractions absent     Imaging:       Abd  US   JUAN      - Q1 2 64cm     - Q2 2 30cm     - Q3 4 26cm     - Q4 3 53cm     - Total: 12 73cm   Placenta: anterior   Presentation: cephalic      Harry Ormond, MD  OBGYN PGY-2  1/17/2023 8:38 PM

## 2023-01-18 NOTE — PROCEDURES
Licha Screen, a  at 26w6d with an MIO of 2023, by Ultrasound, was seen at [facility] for the following procedure(s): $Procedure Type: JUAN]         4 Quadrant JUAN  JUAN Q1 (cm): 2 6 cm  JUAN Q2 (cm): 2 3 cm  JUAN Q3 (cm): 4 3 cm  JUAN Q4 (cm): 3 5 cm  JUAN TOTAL (cm): 12 7 cm  LVP (cm): 4 3 cm

## 2023-01-27 ENCOUNTER — ROUTINE PRENATAL (OUTPATIENT)
Dept: PERINATAL CARE | Facility: CLINIC | Age: 28
End: 2023-01-27

## 2023-01-27 VITALS
SYSTOLIC BLOOD PRESSURE: 104 MMHG | BODY MASS INDEX: 32.55 KG/M2 | HEIGHT: 61 IN | WEIGHT: 172.4 LBS | DIASTOLIC BLOOD PRESSURE: 52 MMHG | HEART RATE: 81 BPM

## 2023-01-27 DIAGNOSIS — Z3A.28 28 WEEKS GESTATION OF PREGNANCY: ICD-10-CM

## 2023-01-27 DIAGNOSIS — O99.212 OBESITY AFFECTING PREGNANCY IN SECOND TRIMESTER: ICD-10-CM

## 2023-01-27 DIAGNOSIS — Z36.3 ENCOUNTER FOR ANTENATAL SCREENING FOR MALFORMATIONS: ICD-10-CM

## 2023-01-27 DIAGNOSIS — Z87.59 HISTORY OF GESTATIONAL HYPERTENSION: ICD-10-CM

## 2023-01-27 DIAGNOSIS — O36.5990 PREGNANCY AFFECTED BY FETAL GROWTH RESTRICTION: Primary | ICD-10-CM

## 2023-01-27 NOTE — LETTER
2023     Twila Dickerson MD  [address]    Patient: Melissa Gallagher   YOB: 1995   Date of Visit: 2023       Dear Dr Fide Castañedar: Thank you for referring Debe Holstein to me for evaluation  Below are my notes for this consultation  If you have questions, please do not hesitate to call me  I look forward to following your patient along with you  Sincerely,        Aidan Price MD        CC: No Recipients  Aidan Price MD  2023  5:48 PM  Sign when Signing Visit  Debe Holstein  has no complaints today at 28w2d  She reports fetal movements and does not report any vaginal bleeding or signs of labor  Problem list:  1  Elevated pregravid BMI 30  2  History of gestational HTN    Ultrasound findings:  A viable king intrauterine pregnancy is seen on today's ultrasound, with growth at the 8th percentile consistent with mild fetal growth restriction  The fetal anatomic survey is complete, and no fetal anomalies are suspected  Amniotic fluid and placenta are within normal limits  The fetal umbilical artery Doppler is normal at 1 19 (95th percentile for this gestational age is 1 34)  Biophysical profile is 8 out of 8  Pregnancy ultrasound has limitations and is unable to detect all forms of fetal congenital abnormalities  Specific counseling was provided on the following problems:  1  Today's ultrasound findings and suggested follow-up were discussed in detail  We discussed that prenatal ultrasound cannot rule out all congenital abnormalities  We discussed that the finding of early onset fetal growth restriction is associated with an increased risk for adverse pregnancy outcomes, including indicated  birth, stillbirth, and preeclampsia  We discussed possible underlying etiologies including placental ischemic disease, aneuploidy, and fetal infection    We discussed the recommendation of NIPS to rule out chromosomal cause and CMV and toxoplasmosis testing to rule out infection  She was given a test kit for NIPS and orders were placed for CMV and toxoplasmosis  2   She was strongly encouraged to complete the early 1 hour glucose test and baseline PreE labs which were ordered previously  3  Fetal kick counts were reviewed  Follow up recommended:   1  Weekly NST, JUAN and and fetal umbilical artery Doppler  2   Growth scan in 3 weeks which was scheduled today     The total time dedicated to this patient encounter was 30 minutes (5 preparation, 15 face-to-face, and 10 documenting)  Split-shared decision-making between [employer] and myself was utilized, with the majority of the time spent by Bran Graff  Procedures that were completed today were charged separately  I reviewed her US pictures and met with the patient along with Vicky Be and recommended the medical decision making transcribed in the care of this patient      Esaw Schlatter MD

## 2023-01-27 NOTE — PROGRESS NOTES
Patient chose to have [facility] Non-invasive Prenatal Screen with fetal sex  Advised patient that she needed to go to the lab immediately  Patient given brochure and is aware [facility] will contact patients insurance and coordinate coverage  Patient made aware she will need to respond to text message or e-mail from [employer] within 2 business days or testing will be run through insurance  Patient informed text message will come from area code  "415"  Provided [address] [place] [address] # [phone] and web site : [email or web address]  Blood collection tubes labeled with patient identifiers (name, date of birth)    printed [facility] lab order and test kit given to patient to take to [facility] outpatient lab for blood collection  Copy of lab order scanned to Epic media  Maternal Fetal Medicine will have results in approximately 7-10 business days and will call patient or notify via [address]  Patient aware viewing lab result online will reveal fetal sex If ordered  Patient verbalized understanding of all instructions and no questions at this time

## 2023-01-27 NOTE — PROGRESS NOTES
Debe Holstein  has no complaints today at 28w2d  She reports fetal movements and does not report any vaginal bleeding or signs of labor  Problem list:  1  Elevated pregravid BMI 30  2  History of gestational HTN    Ultrasound findings:  A viable king intrauterine pregnancy is seen on today's ultrasound, with growth at the 8th percentile consistent with mild fetal growth restriction  The fetal anatomic survey is complete, and no fetal anomalies are suspected  Amniotic fluid and placenta are within normal limits  The fetal umbilical artery Doppler is normal at 1 19 (95th percentile for this gestational age is 1 34)  Biophysical profile is 8 out of 8  Pregnancy ultrasound has limitations and is unable to detect all forms of fetal congenital abnormalities  Specific counseling was provided on the following problems:  1  Today's ultrasound findings and suggested follow-up were discussed in detail  We discussed that prenatal ultrasound cannot rule out all congenital abnormalities  We discussed that the finding of early onset fetal growth restriction is associated with an increased risk for adverse pregnancy outcomes, including indicated  birth, stillbirth, and preeclampsia  We discussed possible underlying etiologies including placental ischemic disease, aneuploidy, and fetal infection  We discussed the recommendation of NIPS to rule out chromosomal cause and CMV and toxoplasmosis testing to rule out infection  She was given a test kit for NIPS and orders were placed for CMV and toxoplasmosis  2   She was strongly encouraged to complete the early 1 hour glucose test and baseline PreE labs which were ordered previously  3  Fetal kick counts were reviewed  Follow up recommended:   1  Weekly NST, JUAN and and fetal umbilical artery Doppler    2   Growth scan in 3 weeks which was scheduled today     The total time dedicated to this patient encounter was 30 minutes (5 preparation, 15 face-to-face, and 10 documenting)  Split-shared decision-making between [employer] and myself was utilized, with the majority of the time spent by Levon Loomis  Procedures that were completed today were charged separately  I reviewed her US pictures and met with the patient along with Keny Dalton and recommended the medical decision making transcribed in the care of this patient      Caden Marroquin MD

## 2023-01-29 ENCOUNTER — HOSPITAL ENCOUNTER (INPATIENT)
Facility: HOSPITAL | Age: 28
LOS: 5 days | Discharge: HOME | End: 2023-02-03
Attending: OBSTETRICS & GYNECOLOGY | Admitting: OBSTETRICS & GYNECOLOGY

## 2023-01-29 ENCOUNTER — HOSPITAL ENCOUNTER (EMERGENCY)
Facility: HOSPITAL | Age: 28
End: 2023-01-29
Attending: STUDENT IN AN ORGANIZED HEALTH CARE EDUCATION/TRAINING PROGRAM | Admitting: STUDENT IN AN ORGANIZED HEALTH CARE EDUCATION/TRAINING PROGRAM

## 2023-01-29 VITALS
DIASTOLIC BLOOD PRESSURE: 72 MMHG | TEMPERATURE: 97.1 F | WEIGHT: 172.3 LBS | BODY MASS INDEX: 32.56 KG/M2 | SYSTOLIC BLOOD PRESSURE: 126 MMHG | HEART RATE: 82 BPM | RESPIRATION RATE: 20 BRPM | OXYGEN SATURATION: 98 %

## 2023-01-29 DIAGNOSIS — O99.323 SUBSTANCE ABUSE AFFECTING PREGNANCY IN THIRD TRIMESTER, ANTEPARTUM: Primary | ICD-10-CM

## 2023-01-29 DIAGNOSIS — R76.8 HEPATITIS C ANTIBODY TEST POSITIVE: ICD-10-CM

## 2023-01-29 DIAGNOSIS — F11.20 FENTANYL USE DISORDER, MODERATE (HCC): Primary | ICD-10-CM

## 2023-01-29 DIAGNOSIS — S10.95XA: ICD-10-CM

## 2023-01-29 LAB
ALBUMIN SERPL BCP-MCNC: 3.7 G/DL (ref 3.5–5)
ALP SERPL-CCNC: 88 U/L (ref 43–122)
ALT SERPL W P-5'-P-CCNC: 37 U/L
AMPHETAMINES SERPL QL SCN: NEGATIVE
ANION GAP SERPL CALCULATED.3IONS-SCNC: 6 MMOL/L (ref 5–14)
AST SERPL W P-5'-P-CCNC: 38 U/L (ref 14–36)
BACTERIA UR QL AUTO: ABNORMAL /HPF
BARBITURATES UR QL: NEGATIVE
BASOPHILS # BLD AUTO: 0.02 THOUSANDS/ÂΜL (ref 0–0.1)
BASOPHILS NFR BLD AUTO: 0 % (ref 0–1)
BENZODIAZ UR QL: NEGATIVE
BILIRUB SERPL-MCNC: 0.68 MG/DL (ref 0.2–1)
BILIRUB UR QL STRIP: ABNORMAL
BUN SERPL-MCNC: 14 MG/DL (ref 5–25)
CALCIUM SERPL-MCNC: 9 MG/DL (ref 8.4–10.2)
CHLORIDE SERPL-SCNC: 106 MMOL/L (ref 96–108)
CLARITY UR: ABNORMAL
CO2 SERPL-SCNC: 23 MMOL/L (ref 21–32)
COCAINE UR QL: NEGATIVE
COLOR UR: ABNORMAL
CREAT SERPL-MCNC: 0.69 MG/DL (ref 0.6–1.2)
EOSINOPHIL # BLD AUTO: 0.13 THOUSAND/ÂΜL (ref 0–0.61)
EOSINOPHIL NFR BLD AUTO: 1 % (ref 0–6)
ERYTHROCYTE [DISTWIDTH] IN BLOOD BY AUTOMATED COUNT: 12.4 % (ref 11.6–15.1)
GFR SERPL CREATININE-BSD FRML MDRD: 118 ML/MIN/1.73SQ M
GLUCOSE SERPL-MCNC: 108 MG/DL (ref 70–99)
GLUCOSE UR STRIP-MCNC: NEGATIVE MG/DL
HCT VFR BLD AUTO: 34.4 % (ref 34.8–46.1)
HGB BLD-MCNC: 11 G/DL (ref 11.5–15.4)
HGB UR QL STRIP.AUTO: NEGATIVE
HYALINE CASTS #/AREA URNS LPF: ABNORMAL /LPF
IMM GRANULOCYTES # BLD AUTO: 0.11 THOUSAND/UL (ref 0–0.2)
IMM GRANULOCYTES NFR BLD AUTO: 1 % (ref 0–2)
KETONES UR STRIP-MCNC: ABNORMAL MG/DL
LEUKOCYTE ESTERASE UR QL STRIP: 25
LYMPHOCYTES # BLD AUTO: 2.04 THOUSANDS/ÂΜL (ref 0.6–4.47)
LYMPHOCYTES NFR BLD AUTO: 13 % (ref 14–44)
MCH RBC QN AUTO: 29.5 PG (ref 26.8–34.3)
MCHC RBC AUTO-ENTMCNC: 32 G/DL (ref 31.4–37.4)
MCV RBC AUTO: 92 FL (ref 82–98)
METHADONE UR QL: NEGATIVE
MONOCYTES # BLD AUTO: 0.68 THOUSAND/ÂΜL (ref 0.17–1.22)
MONOCYTES NFR BLD AUTO: 4 % (ref 4–12)
MUCOUS THREADS UR QL AUTO: ABNORMAL
NEUTROPHILS # BLD AUTO: 12.99 THOUSANDS/ÂΜL (ref 1.85–7.62)
NEUTS SEG NFR BLD AUTO: 81 % (ref 43–75)
NITRITE UR QL STRIP: NEGATIVE
NON-SQ EPI CELLS URNS QL MICRO: ABNORMAL /HPF
NRBC BLD AUTO-RTO: 0 /100 WBCS
OPIATES UR QL SCN: NEGATIVE
OXYCODONE+OXYMORPHONE UR QL SCN: NEGATIVE
PCP UR QL: NEGATIVE
PH UR STRIP.AUTO: 6 [PH]
PLATELET # BLD AUTO: 335 THOUSANDS/UL (ref 149–390)
PMV BLD AUTO: 10.6 FL (ref 8.9–12.7)
POTASSIUM SERPL-SCNC: 4.1 MMOL/L (ref 3.5–5.3)
PROT SERPL-MCNC: 7.3 G/DL (ref 6.4–8.4)
PROT UR STRIP-MCNC: ABNORMAL MG/DL
RBC # BLD AUTO: 3.73 MILLION/UL (ref 3.81–5.12)
RBC #/AREA URNS AUTO: ABNORMAL /HPF
SODIUM SERPL-SCNC: 135 MMOL/L (ref 135–147)
SP GR UR STRIP.AUTO: 1.02 (ref 1–1.04)
THC UR QL: NEGATIVE
UROBILINOGEN UA: 4 MG/DL
WBC # BLD AUTO: 15.97 THOUSAND/UL (ref 4.31–10.16)
WBC #/AREA URNS AUTO: ABNORMAL /HPF

## 2023-01-29 PROCEDURE — 4A1HXCZ MONITORING OF PRODUCTS OF CONCEPTION, CARDIAC RATE, EXTERNAL APPROACH: ICD-10-PCS | Performed by: OBSTETRICS & GYNECOLOGY

## 2023-01-29 NOTE — EMTALA/ACUTE CARE TRANSFER
[facility] EMERGENCY DEPARTMENT  [address]  [address] [address]  [phone]  Dept: [phone]      EMTALA TRANSFER CONSENT    NAME Oleksandr Herrera                                         1995                              MRN [id number]    I have been informed of my rights regarding examination, treatment, and transfer   by Dr Ling Moore MD    Benefits:      Risks:        Consent for Transfer:  I acknowledge that my medical condition has been evaluated and explained to me by the emergency department physician or other qualified medical person and/or my attending physician, who has recommended that I be transferred to the service of  Accepting Physician: Chema Iverson at [address] Name, [address] : [facility]  The above potential benefits of such transfer, the potential risks associated with such transfer, and the probable risks of not being transferred have been explained to me, and I fully understand them  The doctor has explained that, in my case, the benefits of transfer outweigh the risks  I agree to be transferred  I authorize the performance of emergency medical procedures and treatments upon me in both transit and upon arrival at the receiving facility  Additionally, I authorize the release of any and all medical records to the receiving facility and request they be transported with me, if possible  I understand that the safest mode of transportation during a medical emergency is an ambulance and that the Hospital advocates the use of this mode of transport  Risks of traveling to the receiving facility by car, including absence of medical control, life sustaining equipment, such as oxygen, and medical personnel has been explained to me and I fully understand them  (BENITA CORRECT BOX BELOW)  [  ]  I consent to the stated transfer and to be transported by ambulance/helicopter    [  ]  I consent to the stated transfer, but refuse transportation by ambulance and accept full responsibility for my transportation by car  I understand the risks of non-ambulance transfers and I exonerate the Hospital and its staff from any deterioration in my condition that results from this refusal     X___________________________________________    DATE  23  TIME________  Signature of patient or legally responsible individual signing on patient behalf           RELATIONSHIP TO PATIENT_________________________          Provider Certification    NAME Becca Gallagher                                         1995                              MRN [id number]    A medical screening exam was performed on the above named patient  Based on the examination:    Condition Necessitating Transfer The encounter diagnosis was Fentanyl use disorder, moderate ([address] )  Patient Condition: The patient has been stabilized such that within reasonable medical probability, no material deterioration of the patient condition or the condition of the unborn child(igor) is likely to result from the transfer    Reason for Transfer:      Transfer Requirements:  [facility]   · Space available and qualified personnel available for treatment as acknowledged by [employer]  · Agreed to accept transfer and to provide appropriate medical treatment as acknowledged by       Dr Andersen  · Appropriate medical records of the examination and treatment of the patient are provided at the time of transfer   [address], [address] _______  · Transfer will be performed by qualified personnel from [address] [place] [address]  and appropriate transfer equipment as required, including the use of necessary and appropriate life support measures      Provider Certification: I have examined the patient and explained the following risks and benefits of being transferred/refusing transfer to the patient/family:  General risk, such as traffic hazards, adverse weather conditions, rough terrain or turbulence, possible failure of equipment (including vehicle or aircraft), or consequences of actions of persons outside the control of the transport personnel      Based on these reasonable risks and benefits to the patient and/or the unborn child(igor), and based upon the information available at the time of the patient’s examination, I certify that the medical benefits reasonably to be expected from the provision of appropriate medical treatments at another medical facility outweigh the increasing risks, if any, to the individual’s medical condition, and in the case of labor to the unborn child, from effecting the transfer      X____________________________________________ DATE 01/29/23        TIME_______      ORIGINAL - SEND TO MEDICAL RECORDS   COPY - SEND WITH PATIENT DURING TRANSFER

## 2023-01-30 ENCOUNTER — APPOINTMENT (OUTPATIENT)
Dept: ULTRASOUND IMAGING | Facility: HOSPITAL | Age: 28
End: 2023-01-30

## 2023-01-30 PROBLEM — Z3A.28 28 WEEKS GESTATION OF PREGNANCY: Status: ACTIVE | Noted: 2023-01-03

## 2023-01-30 PROBLEM — R76.8 HEPATITIS C ANTIBODY TEST POSITIVE: Status: ACTIVE | Noted: 2023-01-30

## 2023-01-30 PROBLEM — F19.10 SUBSTANCE ABUSE AFFECTING PREGNANCY IN THIRD TRIMESTER, ANTEPARTUM (HCC): Status: ACTIVE | Noted: 2023-01-30

## 2023-01-30 PROBLEM — O99.323 SUBSTANCE ABUSE AFFECTING PREGNANCY IN THIRD TRIMESTER, ANTEPARTUM: Status: ACTIVE | Noted: 2023-01-30

## 2023-01-30 PROBLEM — Z28.39 RUBELLA NON-IMMUNE STATUS, ANTEPARTUM: Status: ACTIVE | Noted: 2023-01-30

## 2023-01-30 PROBLEM — S10.95XA: Status: ACTIVE | Noted: 2023-01-30

## 2023-01-30 PROBLEM — O09.899 RUBELLA NON-IMMUNE STATUS, ANTEPARTUM: Status: ACTIVE | Noted: 2023-01-30

## 2023-01-30 LAB
ABO GROUP BLD: NORMAL
BLD GP AB SCN SERPL QL: NEGATIVE
C TRACH DNA SPEC QL NAA+PROBE: NEGATIVE
CREAT UR-MCNC: 64.6 MG/DL
GLUCOSE SERPL-MCNC: 83 MG/DL (ref 65–140)
GLUCOSE SERPL-MCNC: 85 MG/DL (ref 65–140)
HAV IGM SER QL: ABNORMAL
HBV CORE AB SER QL: ABNORMAL
HBV CORE IGM SER QL: ABNORMAL
HBV CORE IGM SER QL: ABNORMAL
HBV SURFACE AG SER QL: ABNORMAL
HBV SURFACE AG SER QL: ABNORMAL
HCV AB SER QL: ABNORMAL
HCV AB SER QL: ABNORMAL
HIV 1+2 AB+HIV1 P24 AG SERPL QL IA: NORMAL
HIV1 P24 AG SER QL: NORMAL
N GONORRHOEA DNA SPEC QL NAA+PROBE: NEGATIVE
PROT UR-MCNC: 6 MG/DL
PROT/CREAT UR: 0.09 MG/G{CREAT} (ref 0–0.1)
RH BLD: NEGATIVE
RPR SER QL: NORMAL
RUBV IGG SERPL IA-ACNC: 7.3 IU/ML
SPECIMEN EXPIRATION DATE: NORMAL
TSH SERPL DL<=0.05 MIU/L-ACNC: 3 UIU/ML (ref 0.45–4.5)
VZV IGG SER QL IA: NORMAL

## 2023-01-30 PROCEDURE — 3E0234Z INTRODUCTION OF SERUM, TOXOID AND VACCINE INTO MUSCLE, PERCUTANEOUS APPROACH: ICD-10-PCS | Performed by: OBSTETRICS & GYNECOLOGY

## 2023-01-30 RX ADMIN — TETANUS TOXOID, REDUCED DIPHTHERIA TOXOID AND ACELLULAR PERTUSSIS VACCINE, ADSORBED 0.5 ML: 5; 2.5; 8; 8; 2.5 SUSPENSION INTRAMUSCULAR at 11:15

## 2023-01-30 RX ADMIN — HUMAN RHO(D) IMMUNE GLOBULIN 300 MCG: 300 INJECTION, SOLUTION INTRAMUSCULAR at 11:12

## 2023-01-30 NOTE — PROGRESS NOTES
Progress Note  Jhon Donald 32 y o  female MRN: [id number]  Unit/Bed#: L&D 327-01 Encounter: [id number]    Assessment:  32 y o  Blanche Coats at 28w5d admitted for for MAT initiation with suboxone therapy in the setting of OUD, last use on 1/29/23  Hospital day 1:    Plan:  * Substance abuse disorder affecting pregnancy in third trimester, antepartum  Assessment & Plan  · Fentanyl and heroin are drugs of choice, last use at 1600 on 1/29; 5-6 bags of fentanyl  · Currently lives in a recovery home with actively using tenants, has been using again for about 1 month  · CM consulted to help with housing  · Initial rapid UDS negative, fentanyl pending  · Injection sites visibile on R lateral neck and L dorsum of hand  · Initial COWS score 0, continue to monitor score q2h  · Plan to initiate therapy when score >/= 12    Foreign body of skin of neck  Assessment & Plan  Reported two needle tips in track line of R lateral neck  Will reach out to general surgery regarding imaging and further assessment  Affected area track line, non-expanding, w/o associated breathing concerns or apparent vascular compromise    28 weeks gestation of pregnancy  Assessment & Plan  NST TID  Denies ctx, loss of fluid, vaginal bleeding, and reports good fetal movement  Prenatal labs ordered  Can do 1hr GTT prior to discharge  Will give rhogam and tdap      Subjective/Objective   Subjective:     Contractions: no  Loss of fluid: no  Vaginal bleeding: no  Fetal movement: no    Talked to patient this morning about her current situation and desire to start suboxone  Said she has never been on methadone but used to get the long acting suboxone injection which she said worked well for her  Feels like she started using again because the recovery house she lives in is not strict and a lot of the other tenants were using  Her boyfriend also uses but she said he recently got out of [facility] and then started using again since she was using   He went to detox on the same day she was admitted  Her other child is 10years old and lives with her sister (by her request) to be cared for when she relapsed about a year ago  [employer] were involved in that case and we discussed that they will have to be involved with this child as well  She currently denies N/V but has some flushing  No other complaints at this time  Objective:     Vitals:   Temp:  [97 1 °F (36 2 °C)-97 8 °F (36 6 °C)] 97 4 °F (36 3 °C)  HR:  [63-82] 71  Resp:  [16-20] 19  BP: (102-126)/(50-72) 115/63       Physical Exam  Vitals reviewed  Constitutional:       General: She is not in acute distress  Appearance: She is normal weight  HENT:      Mouth/Throat:      Mouth: Mucous membranes are moist       Pharynx: Oropharynx is clear  Neck:      Comments: Injection site with erythema/induration on neck above right clavicle, palpable cords  Cardiovascular:      Rate and Rhythm: Normal rate  Pulses: Normal pulses  Pulmonary:      Effort: Pulmonary effort is normal  No respiratory distress  Abdominal:      Palpations: Abdomen is soft  Comments: gravid   Skin:     General: Skin is warm and dry  Neurological:      General: No focal deficit present  Mental Status: She is alert and oriented to person, place, and time  Mental status is at baseline  Fetal Assessment:  FHT: 140 / Moderate 6 - 25 bpm / 15x15, reactive  [place]: none    Lab, Imaging and other studies: I have personally reviewed pertinent reports        Lab Results   Component Value Date    WBC 15 97 (H) 01/29/2023    HGB 11 0 (L) 01/29/2023    HCT 34 4 (L) 01/29/2023    MCV 92 01/29/2023     01/29/2023       Lab Results   Component Value Date    CALCIUM 9 0 01/29/2023    K 4 1 01/29/2023    CO2 23 01/29/2023     01/29/2023    BUN 14 01/29/2023    CREATININE 0 69 01/29/2023       Lab Results   Component Value Date    ALT 37 (H) 01/29/2023    AST 38 (H) 01/29/2023    ALKPHOS 88 01/29/2023       Gloria Becca Zeng MD  OBN, PGY-2  1/30/2023  7:45 AM

## 2023-01-30 NOTE — PLAN OF CARE
Problem: PAIN - ADULT  Goal: Verbalizes/displays adequate comfort level or baseline comfort level  Description: Interventions:  - Encourage patient to monitor pain and request assistance  - Assess pain using appropriate pain scale  - Administer analgesics based on type and severity of pain and evaluate response  - Implement non-pharmacological measures as appropriate and evaluate response  - Consider cultural and social influences on pain and pain management  - Notify physician/advanced practitioner if interventions unsuccessful or patient reports new pain  Outcome: Progressing     Problem: INFECTION - ADULT  Goal: Absence or prevention of progression during hospitalization  Description: INTERVENTIONS:  - Assess and monitor for signs and symptoms of infection  - Monitor lab/diagnostic results  - Monitor all insertion sites, i e  indwelling lines, tubes, and drains  - Monitor endotracheal if appropriate and nasal secretions for changes in amount and color  - [place] appropriate cooling/warming therapies per order  - Administer medications as ordered  - Instruct and encourage patient and family to use good hand hygiene technique  - Identify and instruct in appropriate isolation precautions for identified infection/condition  Outcome: Progressing  Goal: Absence of fever/infection during neutropenic period  Description: INTERVENTIONS:  - Monitor WBC    Outcome: Progressing     Problem: SAFETY ADULT  Goal: Patient will remain free of falls  Description: INTERVENTIONS:  - Educate patient/family on patient safety including physical limitations  - Instruct patient to call for assistance with activity   - Consult OT/PT to assist with strengthening/mobility   - Keep Call bell within reach  - Keep bed low and locked with side rails adjusted as appropriate  - Keep care items and personal belongings within reach  - Initiate and maintain comfort rounds  - Make Fall Risk Sign visible to staff  - Apply yellow socks and bracelet for high fall risk patients  - Consider moving patient to room near nurses station  Outcome: Progressing  Goal: Maintain or return to baseline ADL function  Description: INTERVENTIONS:  -  Assess patient's ability to carry out ADLs; assess patient's baseline for ADL function and identify physical deficits which impact ability to perform ADLs (bathing, care of mouth/teeth, toileting, grooming, dressing, etc )  - Assess/evaluate cause of self-care deficits   - Assess range of motion  - Assess patient's mobility; develop plan if impaired  - Assess patient's need for assistive devices and provide as appropriate  - Encourage maximum independence but intervene and supervise when necessary  - Involve family in performance of ADLs  - Assess for home care needs following discharge   - Consider OT consult to assist with ADL evaluation and planning for discharge  - Provide patient education as appropriate  Outcome: Progressing  Goal: Maintains/Returns to pre admission functional level  Description: INTERVENTIONS:  - Perform BMAT or MOVE assessment daily    - Set and communicate daily mobility goal to care team and patient/family/caregiver     - Collaborate with rehabilitation services on mobility goals if consulted  - Out of bed for toileting  - Record patient progress and toleration of activity level   Outcome: Progressing     Problem: DISCHARGE PLANNING  Goal: Discharge to home or other facility with appropriate resources  Description: INTERVENTIONS:  - Identify barriers to discharge w/patient and caregiver  - Arrange for needed discharge resources and transportation as appropriate  - Identify discharge learning needs (meds, wound care, etc )  - Arrange for interpretive services to assist at discharge as needed  - Refer to Case Management Department for coordinating discharge planning if the patient needs post-hospital services based on physician/advanced practitioner order or complex needs related to functional status, cognitive ability, or social support system  Outcome: Progressing     Problem: BIRTH - VAGINAL/ SECTION  Goal: Fetal and maternal status remain reassuring during the birth process  Description: INTERVENTIONS:  - Monitor vital signs  - Monitor fetal heart rate  - Monitor uterine activity  - Monitor labor progression (vaginal delivery)  - DVT prophylaxis  - Antibiotic prophylaxis  Outcome: Progressing  Goal: Emotionally satisfying birthing experience for mother/fetus  Description: Interventions:  - Assess, plan, implement and evaluate the nursing care given to the patient in labor  - Advocate the philosophy that each childbirth experience is a unique experience and support the family's chosen level of involvement and control during the labor process   - Actively participate in both the patient's and family's teaching of the birth process  - Consider cultural, Bahai and age-specific factors and plan care for the patient in labor  Outcome: Progressing     Problem: POSTPARTUM  Goal: Experiences normal postpartum course  Description: INTERVENTIONS:  - Monitor maternal vital signs  - Assess uterine involution and lochia  Outcome: Progressing  Goal: Appropriate maternal -  bonding  Description: INTERVENTIONS:  - Identify family support  - Assess for appropriate maternal/infant bonding   -Encourage maternal/infant bonding opportunities  - Referral to  or  as needed  Outcome: Progressing  Goal: Establishment of infant feeding pattern  Description: INTERVENTIONS:  - Assess breast/bottle feeding  - Refer to lactation as needed  Outcome: Progressing  Goal: Incision(s), wounds(s) or drain site(s) healing without S/S of infection  Description: INTERVENTIONS  - Assess and document dressing, incision, wound bed, drain sites and surrounding tissue  - Provide patient and family education  Outcome: Progressing

## 2023-01-30 NOTE — ASSESSMENT & PLAN NOTE
· Hep C RNA quant 5 288  · Hep A and B negative for acute infection  · Hep A antibody non-reactive   · Hep A non-immune  · Dose 1 of 2 ordered (havrix) 2 doses required 6-12 months appart  · Hep B  · Dose 1 of 3 ordered, Pending second dose in 1 month and 3rd dose in 6 months

## 2023-01-30 NOTE — H&P
H&P Exam - Gynecology   Celeste Gallagher 32 y o  female MRN: [id number]  Unit/Bed#: L&D 327-01 Encounter: [id number]    Assessment: Devan Gregorio is a 32 y o  female who presents for inpatient detoxification in the setting of recent fentanyl abuse  History of fentanyl abuse with 5-6 bags per day  COWS score 0  No signs of withdrawal   Hemodynamically stable  Admitted for inpatient management  Plan:  Foreign body of skin of neck  Assessment & Plan  Reported two needle tips in track line of R lateral neck  Discuss with general surgery regarding imaging and further assessment  Hemodynamically stable  Affected area with papular track line, non-expanding, w/o associated breathing concerns or apparent vascular compromise    28 weeks gestation of pregnancy  Assessment & Plan  NST reactive with minimal to low moderate variability  130bpm baseline  NST TID  Denies ctx, loss of fluid, vaginal bleeding, and reports good fetal movement    * Substance abuse affecting pregnancy in third trimester, antepartum  Assessment & Plan  Fentanyl abuse, last use at 1600 on 1/29; 5-6 bags of fentanyl  Prior remission in September 2022 with remission near end of November 2022  Currently lives in a recovery home with actively using tenants  Initially presented to [facility] with desire for [employer], transferred to [facility] for continued care with obstetrics  Toxicology urine screen ordered, initial rapid UDS at [facility] negative  Track lines apparently on R lateral neck and L dorsum of hand  Initial COWS score 0  Continue to monitor score q2h  Plan to initiate therapy when score >/= 12      Discussed case and plan w/ Dr Vannessa Simmons    History of Present Illness   HPI:  Devan Gregorio is a 32 y o  female who initially presented to [facility] for desire for inpatient detoxification  Patient reports relapse into substance abuse with fentanyl using 5-6 bags per day since November 2022    On initial prenatal assessment in September 2022, patient reported 7 weeks of sobriety  Patient reported initial relapse near Thanksgiving with intermittent use of fentanyl since then  Denies use of other substances with congruent rapid urine drug screen completed at [facility]  Patient does report a rehab admission in January 2022, records not readily available in [address] [place] [address] [place]  Elis Alexander desires to initiate Suboxone therapy while inpatient but does not desire to continue medication use at discharge  We discussed protocol for Suboxone initiation including monitoring COWS scores every 2 hours and titrating medication up according to associated symptoms  We also discussed remaining open minded should Suboxone therapy reach higher doses and recommendation to either continue medication outpatient or start taper to wean off medication rather than immediate cessation  Patient expressed understanding and was amenable to treatment and plan  After discussion of Suboxone therapy, patient reported that she had 2 needle tips lodged within papular track line on her right lateral neck  She reports that the first needle tip has been there for "many months" and was previously assessed and ultimately determined that it was "safer to be left in than removed "  The second needle tip reportedly became lodged after injection today  Second track line apparent on dorsum of left hand, denies other areas of injection  I discussed with the patient that she will most likely require neck imaging for further assessment and interdisciplinary discussion with general surgery team   Patient expressed understanding and all questions answered at bedside  Review of Systems   Constitutional: Negative for chills and fever  HENT: Negative for congestion, sinus pressure and sinus pain  Eyes: Negative for visual disturbance  Respiratory: Negative for cough, shortness of breath and wheezing  Cardiovascular: Negative for chest pain, palpitations and leg swelling  Gastrointestinal: Negative for abdominal pain, constipation, diarrhea, nausea and vomiting  Genitourinary: Negative for dysuria, vaginal bleeding and vaginal discharge  Skin: Negative for color change, pallor and rash  Neurological: Negative for weakness and light-headedness  Psychiatric/Behavioral: Negative for agitation and behavioral problems         Historical Information   Past Medical History:   Diagnosis Date   • Abnormal Pap smear of cervix      abn pap, colpo WNL, cryotherapy; 10/2022 HSIL pap   • Asthma    • Depression    • Preeclampsia      Past Surgical History:   Procedure Laterality Date   • GYNECOLOGIC CRYOSURGERY  2016     OB History    Para Term  AB Living   2 1 1 0 0 1   SAB IAB Ectopic Multiple Live Births   0 0 0 0 1      # Outcome Date GA Lbr Tommy/2nd Weight Sex Delivery Anes PTL Lv   2 Current            1 Term 16 39w4d 11:45 / 00:26 3220 g (7 lb 1 6 oz) F Vag-Spont EPI N DAVONTE      Complications: Gestational hypertension     Family History   Problem Relation Age of Onset   • Cancer Neg Hx    • Venous thrombosis Neg Hx    • Diabetes Neg Hx    • Breast cancer Neg Hx    • Colon cancer Neg Hx    • Ovarian cancer Neg Hx      Social History   Social History     Substance and Sexual Activity   Alcohol Use Not Currently    Comment: last used 2021     Social History     Substance and Sexual Activity   Drug Use Not Currently   • Types: Heroin, Methamphetamines, Fentanyl    Comment: last used 2022     Social History     Tobacco Use   Smoking Status Some Days   • Types: Cigarettes   • Last attempt to quit: 2022   • Years since quittin 7   Smokeless Tobacco Never       Meds/Allergies   Medications Prior to Admission   Medication   • aspirin (ECOTRIN LOW STRENGTH) 81 mg EC tablet   • Prenatal Vit-Fe Fumarate-FA (PRENATAL VITAMINS PO)     No Known Allergies    Objective   /59   Pulse 68   Temp 97 8 °F (36 6 °C) (Oral)   Resp 16   LMP 2022 No intake or output data in the 24 hours ending 01/30/23 0354    Invasive Devices: Invasive Devices     Peripheral Intravenous Line  Duration           Peripheral IV 01/29/23 Dorsal (posterior); Right Hand <1 day                Physical Exam  Vitals and nursing note reviewed  Exam conducted with a chaperone present  Constitutional:       General: She is not in acute distress  HENT:      Head: Normocephalic  Right Ear: External ear normal       Left Ear: External ear normal    Eyes:      General: No scleral icterus  Right eye: No discharge  Left eye: No discharge  Conjunctiva/sclera: Conjunctivae normal    Cardiovascular:      Rate and Rhythm: Normal rate and regular rhythm  Pulses: Normal pulses  Heart sounds: Normal heart sounds  Pulmonary:      Effort: Pulmonary effort is normal  No respiratory distress  Breath sounds: Normal breath sounds  Abdominal:      Palpations: Abdomen is soft  Tenderness: There is no abdominal tenderness  There is no guarding  Comments: Gravid uterus   Musculoskeletal:         General: No swelling or tenderness  Normal range of motion  Cervical back: Normal range of motion  Right lower leg: No edema  Left lower leg: No edema  Skin:     General: Skin is warm and dry  Capillary Refill: Capillary refill takes less than 2 seconds  Findings: Lesion present  Comments: Papular track lines on dorsum of left hand and right lateral neck near subclavian vein  Well-healed scars from horizontal incisions on left arm 2/2 to self-inflicted cuts  Bilateral lower extremities without track lines   Neurological:      Mental Status: She is alert and oriented to person, place, and time  Mental status is at baseline     Psychiatric:         Mood and Affect: Mood normal          Behavior: Behavior normal          Lab Results:   Admission on 01/29/2023, Discharged on 01/29/2023   Component Date Value   • WBC 01/29/2023 15 97 (H)    • RBC 01/29/2023 3 73 (L)    • Hemoglobin 01/29/2023 11 0 (L)    • Hematocrit 01/29/2023 34 4 (L)    • MCV 01/29/2023 92    • MCH 01/29/2023 29 5    • MCHC 01/29/2023 32 0    • RDW 01/29/2023 12 4    • MPV 01/29/2023 10 6    • Platelets 97/39/6123 335    • nRBC 01/29/2023 0    • Neutrophils Relative 01/29/2023 81 (H)    • Immat GRANS % 01/29/2023 1    • Lymphocytes Relative 01/29/2023 13 (L)    • Monocytes Relative 01/29/2023 4    • Eosinophils Relative 01/29/2023 1    • Basophils Relative 01/29/2023 0    • Neutrophils Absolute 01/29/2023 12 99 (H)    • Immature Grans Absolute 01/29/2023 0 11    • Lymphocytes Absolute 01/29/2023 2 04    • Monocytes Absolute 01/29/2023 0 68    • Eosinophils Absolute 01/29/2023 0 13    • Basophils Absolute 01/29/2023 0 02    • Sodium 01/29/2023 135    • Potassium 01/29/2023 4 1    • Chloride 01/29/2023 106    • CO2 01/29/2023 23    • ANION GAP 01/29/2023 6    • BUN 01/29/2023 14    • Creatinine 01/29/2023 0 69    • Glucose 01/29/2023 108 (H)    • Calcium 01/29/2023 9 0    • AST 01/29/2023 38 (H)    • ALT 01/29/2023 37 (H)    • Alkaline Phosphatase 01/29/2023 88    • Total Protein 01/29/2023 7 3    • Albumin 01/29/2023 3 7    • Total Bilirubin 01/29/2023 0 68    • eGFR 01/29/2023 118    • Amph/Meth UR 01/29/2023 Negative    • Barbiturate Ur 01/29/2023 Negative    • Benzodiazepine Urine 01/29/2023 Negative    • Cocaine Urine 01/29/2023 Negative    • Methadone Urine 01/29/2023 Negative    • Opiate Urine 01/29/2023 Negative    • PCP Ur 01/29/2023 Negative    • THC Urine 01/29/2023 Negative    • Oxycodone Urine 01/29/2023 Negative    • Color, UA 01/29/2023 Brown (A)    • Clarity, UA 01/29/2023 Slightly Cloudy (A)    • Specific Gravity, UA 01/29/2023 1 025    • pH, UA 01/29/2023 6 0    • Leukocytes, UA 01/29/2023 25 0 (A)    • Nitrite, UA 01/29/2023 Negative    • Protein, UA 01/29/2023 30 (1+) (A)    • Glucose, UA 01/29/2023 Negative    • Ketones, UA 01/29/2023 5 (Trace) (A) • Bilirubin, UA 01/29/2023 1 mg/dL (A)    • Occult Blood, UA 01/29/2023 Negative    • URINE COMMENT 01/29/2023     • UROBILINOGEN UA 01/29/2023 4 0 (A)    • RBC, UA 01/29/2023 None Seen    • WBC, UA 01/29/2023 2-4    • Epithelial Cells 01/29/2023 Occasional    • Bacteria, UA 01/29/2023 Moderate (A)    • Hyaline Casts, UA 01/29/2023 4-10 (A)    • MUCUS THREADS 01/29/2023 Innumerable (A)    • URINE COMMENT 01/29/2023        Imaging: I have personally reviewed pertinent reports  EKG, Pathology, and Other Studies: I have personally reviewed pertinent reports          Doyle Lucio MD  1/29/2023  9:22 PM

## 2023-01-30 NOTE — ASSESSMENT & PLAN NOTE
NST TID  Denies ctx, loss of fluid, vaginal bleeding, and reports good fetal movement  Prenatal labs ordered  Rhogam and tdap given 1/30

## 2023-01-30 NOTE — CASE MANAGEMENT
Case Management Assessment    Patient name [facility]  Location L&D 327/L&D 592-92 MRN [id number]  : 1995 Date 2023       Current Admission Date: 2023  Current Admission Diagnosis:Substance abuse disorder affecting pregnancy in third trimester, antepartum   Patient Active Problem List    Diagnosis Date Noted   • Substance abuse disorder affecting pregnancy in third trimester, antepartum 2023   • Foreign body of skin of neck 2023   • Pregnancy affected by fetal growth restriction 2023   • 28 weeks gestation of pregnancy 2023   • Birth control counseling 2022   • Abnormal Pap smear of cervix 10/17/2022   • History of gestational hypertension 10/11/2022   • Obesity affecting pregnancy 10/11/2022      LOS (days): 1  Geometric Mean LOS (GMLOS) (days):   Days to GMLOS:     OBJECTIVE:    Risk of Unplanned Readmission Score: 9 4         Current admission status: Inpatient       Preferred Pharmacy:   [facility] [address] [facility] , [address] [facility] [address]  Phone: [phone] Fax: [phone]    Primary Care Provider: DESMOND Díaz    Primary Insurance: Kevin Soles  Secondary Insurance:     ASSESSMENT:  [facility] Proxies    There are no active Health Care Proxies on file  CM consulted for "Suboxone initiation, in pregnancy"  Per OBGYN, patient reports use of heroin/fentanyl 5-6 bags  Last reported substance use yesterday 23  CM met with patient at bedside  Patient reports that she is unsure what substance she used yesterday, but her historical drug of choice was fentanyl  No current treatment, historically used MAT through [facility]  Patient expressed interest in OP treatment - agreeable to [facility] referral & signed medical release form  CM called in referral and faxed clinicals   If possible, plan for MFM to bridge suboxone until patient is established with an OP provider through [facility]  Patient reports she has been living in a sober living home  Patient relapsed 1-2 months ago because she realized she "could get away with it" after her roommate was reported to the facility owner for using meth but nothing was done  Patient reports the people who were actively using in the home are now gone (1 incarcerated and 1 in rehab)  Patient reports she plans to continue living there as she owes the owner Johanny sparks (pays week to week, is actively working)  Patient reports she will not be kicked out at this time  CM provided patient with sober living resources and shelter resources  CM also educated patient on dangers of continuing to use while on MAT  Patient expressed understanding  Patient reports she plans to parent this baby and that she has "big" baby supplies from her older child (minus car seat)  CM provided patient with infant freebie list  Per chart, patient is currently active with [facility] for her older child in [address] [place] [address] placed new referral online (e-Referral ID: [id number])  Patient aware  CM then placed referral to [facility] via coordinator Brad Noyola at [facility] with patient's permission  Brad Noyola stated she would call patient within the next 24hrs to discuss next steps

## 2023-01-30 NOTE — CONSULTS
Consultation - General Surgery   Sergio Males Elliott 32 y o  female MRN: [id number]  Unit/Bed#: L&D 327-01 Encounter: [id number]    Assessment/Plan     Assessment:  R neck foreign body (needle)    Plan:  No plans for surgical intervention, as the risks of causing iatrogenic vascular injury outweigh the potential benefits of foreign body removal  Foreign will likely incorporate into the soft tissue and should not pose a significant risk moving forward  No evidence of drainable abscess on clinical exam or on R neck US study  General surgery will sign off; please call with any questions/concerns  History of Present Illness     HPI:  Sreekanth Newell is a 32 y o  female who presents with R neck foreign body  Patient is 28w 5 d pregnant and initially presented for MAT initiation with suboxone therapy for OUD  Patient last injected 1/29 in R neck and subsequently had needle tip lodged in the area  She also reports a needle already present near that location that has been there for some time  Inpatient consult to Acute Care Surgery  Consult performed by: Magdiel Knight MD  Consult ordered by: Joie Pacheco MD          Review of Systems   All other systems reviewed and are negative  As stated in HPI      Historical Information   Past Medical History:   Diagnosis Date   • Abnormal Pap smear of cervix     2016 abn pap, colpo WNL, cryotherapy; 10/2022 HSIL pap   • Asthma    • Depression    • Preeclampsia 2016     Past Surgical History:   Procedure Laterality Date   • GYNECOLOGIC CRYOSURGERY  2016     Social History   Social History     Substance and Sexual Activity   Alcohol Use Not Currently    Comment: last used 12/2021     Social History     Substance and Sexual Activity   Drug Use Not Currently   • Types: Heroin, Methamphetamines, Fentanyl    Comment: last used 1/28/2022     E-Cigarette/Vaping   • E-Cigarette Use Never User      E-Cigarette/Vaping Substances     Social History     Tobacco Use   Smoking Status Some Days   • Types: Cigarettes   • Last attempt to quit: 2022   • Years since quittin 7   Smokeless Tobacco Never     Family History:   Family History   Problem Relation Age of Onset   • Cancer Neg Hx    • Venous thrombosis Neg Hx    • Diabetes Neg Hx    • Breast cancer Neg Hx    • Colon cancer Neg Hx    • Ovarian cancer Neg Hx        Meds/Allergies   all current active meds have been reviewed  No Known Allergies    Objective   First Vitals:   Blood Pressure: 103/57 (23 2342)  Pulse: 68 (23 2300)  Temperature: 97 8 °F (36 6 °C) (23 2210)  Temp Source: Oral (23 2210)  Respirations: 16 (23 2210)  SpO2: 95 % (23 0700)    Current Vitals:   Blood Pressure: 128/75 (23 1306)  Pulse: 71 (23 1500)  Temperature: (!) 97 4 °F (36 3 °C) (23 0700)  Temp Source: Oral (23 0700)  Respirations: 19 (23 0700)  SpO2: 95 % (23 0700)    No intake or output data in the 24 hours ending 23 1527    Invasive Devices     Peripheral Intravenous Line  Duration           Peripheral IV 23 Dorsal (posterior); Right Hand <1 day                Physical Exam   Gen:  NAD  HENT: R neck with raised indurated area c/w chronic inflammation, no fluctuance or e/o surrounding cellulitis, FB is not palpable  CV:  RRR  Lungs: nl effort  Abd:  gravid  Ext:  no CCE  Skin:  no rashes  Neuro: A&Ox3     Lab Results:   I have personally reviewed pertinent lab results    , CBC:   Lab Results   Component Value Date    WBC 15 97 (H) 2023    HGB 11 0 (L) 2023    HCT 34 4 (L) 2023    MCV 92 2023     2023    MCH 29 5 2023    MCHC 32 0 2023    RDW 12 4 2023    MPV 10 6 2023    NRBC 0 2023   , CMP:   Lab Results   Component Value Date    SODIUM 135 2023    K 4 1 2023     2023    CO2 23 2023    BUN 14 2023    CREATININE 0 69 2023    CALCIUM 9 0 2023    AST 38 (H) 2023 ALT 37 (H) 01/29/2023    ALKPHOS 88 01/29/2023    EGFR 118 01/29/2023     Imaging: I have personally reviewed pertinent reports  and I have personally reviewed pertinent films in PACS  EKG, Pathology, and Other Studies: I have personally reviewed pertinent reports

## 2023-01-30 NOTE — ASSESSMENT & PLAN NOTE
· Fentanyl and heroin are drugs of choice, last use at 1600 on 1/29; 5-6 bags of fentanyl  · Currently lives in a recovery home with actively using tenants, has been using again for about 1 month  · CM consulted to help with housing and outpatient prescription of suboxone  · Initial rapid UDS negative, fentanyl pending  · Injection sites visibile on R lateral neck and L dorsum of hand  · MAT agreement signed with Dr Alisa Humphrey, monitoring COWS scores score q2-4h    Suboxone doses:  · 1/31: 4mg (1100)  · 2/1: 4mg (0012), 4mg (nina 0900)  · No new PRN doses required  · Current symptoms/PRN med:  · Restless legs/anxiety - atarax 25mg q6, flexeril 10mg q8  · Nausea - zofran PO q6  · Insomnia - melatonin 3mg qhs    · Completed  [facility] intake yesterday  Plan is for patient to have intake for OP D&A rehab/counseling through [facility], has a virtual appointment on Friday Feb 10th at [id number] [place] [address]  Patient is agreeable to getting her Suboxone through the [facility] office in [address] [place] [address] for february 16/23, nevertheless unsure if share will start to prescribe her Suboxone in that date  Awaiting further information in order to define how many days of Suboxone she need at time of discharge   CM following

## 2023-01-30 NOTE — ASSESSMENT & PLAN NOTE
Needle tip in superficial tissue of R lateral neck  US showed 7mm needle tip 1mm from IJ vein, gen surg evaluated and do not recommend surgical intervantion

## 2023-01-30 NOTE — NURSING NOTE
Hospital supervisor and security called to patient room for belonging search  Pt informed staff that there was a pink bag with paraphernalia inside that Security obtained and discarded in sharps container

## 2023-01-30 NOTE — UTILIZATION REVIEW
NOTIFICATION OF INPATIENT ADMISSION   [facility] MATERNITY AUTHORIZATION REQUEST   SERVICING FACILITY:   [address] [place] [address] - L&D, , NICU  [address] [place] [address], [place] [address] [address] [facility]  Tax ID: [id number]  NPI: [id number]   ATTENDING PROVIDER:  Attending Name and NPI#: Tayla Ly Md [[id number]]  Address: [address] [place] [address] [facility]  Phone: [phone]     ADMISSION INFORMATION:  Place of Service: Inpatient [facility]  [address]  Place of Service Code: 21  Inpatient Admission Date/Time: 23  9:17 PM  Discharge Date/Time: No discharge date for patient encounter  Admitting Diagnosis Code/Description:  At risk for withdrawal [Z91 89]     UTILIZATION REVIEW CONTACT:  Rajeev Garcia Utilization   Network Utilization Review Department  Phone: [phone]  Fax [phone]  Email: [email or web address]  Contact for approvals/pending authorizations, clinical reviews, and discharge  PHYSICIAN ADVISORY SERVICES:  Medical Necessity Denial & [place] Review  Phone: [phone]  Fax: [phone]  Email: [email or web address]

## 2023-01-30 NOTE — QUICK NOTE
Patient was seen and examined  Full consult to follow  Based on location of this presumed needle, would ask vascular surgery to evaluate  This would not be in the area of general surgical expertise  In any case is very unlikely surgical intervention would be useful here as this would be finding the proverbial needle in the haystack  There is always a risk of puncture of the venous or arterial structures, this is very low level of likelihood but always possible  I discussed with the patient lifestyle management changes that would help her moving forward but this does not mitigate the fact that there could be a small needle there left behind  Hopefully this will incorporate into the soft tissue and not pose a significant risk  Would consult vascular surgery for further recommendations      Signature:   Kendra Pedroza MD  Date: 1/30/2023 Time: 3:09 PM

## 2023-01-30 NOTE — UTILIZATION REVIEW
Initial Clinical Review    Admission: Date/Time/Statement:   Admission Orders (From admission, onward)     Ordered        01/29/23 2148  Inpatient Admission  Once                      Orders Placed This Encounter   Procedures   • Inpatient Admission     Standing Status:   Standing     Number of Occurrences:   1     Order Specific Question:   Level of Care     Answer:   Med Surg [16]     Order Specific Question:   Estimated length of stay     Answer:   More than 2 Midnights     Order Specific Question:   Certification     Answer:   I certify that inpatient services are medically necessary for this patient for a duration of greater than two midnights  See H&P and MD Progress Notes for additional information about the patient's course of treatment  Chief Complaint   Patient presents with   • Withdrawal - Drug       Initial Presentation: 32 y o  female presents to ED as a transfer from [address] [place] [address] where she initially presented for detoxification and transferred to [address] [place] [address] as pt is 28 wks pregnant  Pt reports relapse into substance abuse with fentanyl using 5-6 bags per day since November 2022  Pt desires to initiate Suboxone therapy while inpatient but does not desire to continue medication use at d/c  Pt also reported that she had 2 needle tips lodged within papular track line on her right lateral neck  She reports that the first needle tip has been there for "many months" and was previously assessed and ultimately determined that it was "safer to be left in than removed "  The second needle tip reportedly became lodged after injection today  Second track line apparent on dorsum of left hand, denies other areas of injection  Fentanyl abuse, last use at 1600 on 1/29; 5-6 bags of fentanyl  Toxicology urine screen ordered, initial rapid UDS at [facility] negative  ADMIT INPATIENT to L&D UNIT for INPATIENT DETOXIFICATION for fentanyl abuse -- current COWS score 0   No signs of withdrawal  Monitor score q2h   NST reactive with minimal to low moderate variability  130 bpm baseline  Continue NST TID  Denies ctx, loss of fluid, vaginal bleeding, and reports good fetal movement  Case management consulted  Date: 1/30  Day 2: Pt is motivated to start suboxone  Feels she relapsed b/c the recovery house she lives in is not strict and a lot of other tenants were using  States she's been using for ~ 1 mo  Injection sites visibile on R lateral neck and L dorsum of hand  Currently denies N/V but endoruses some flushing  No other complaints currently  Continue COWS score q 2h  COWS results below  Plan to initiate therapy when score >/= 12  Continue NST TID  Prenatal labs ordered  Can do 1hr GTT prior to discharge  Will give rhogam and tdap  Reg diet  SCDs       Clinical Opiate Withdrawal Scale  Row Name 01/30/23 1300 01/30/23 1111 01/30/23 1100 01/30/23 0903 01/30/23 0900   Clinical Opiate Withdrawal Scale   Pulse 75  -MS 69  -MS 69  -MS 74  -MS 74  -MS   Resting Pulse Rate: Measured After Patient is Sitting or Lying for One Minute 0  -MS -- 0  -MS -- 0  -MS   GI Upset: Over Last Half Hour 0  -MS -- 0  -MS -- 0  -MS   Sweating: Over Past Half Hour Not Accounted for by Room Temperature of Patient Activity 0  -MS -- 1  -MS -- 2  -MS   Tremor: Observation of Outstretched Hands 0  -MS -- 0  -MS -- 0  -MS   Restlessness: Observation During Assessment 0  -MS -- 0  -MS -- 0  -MS   Yawning: Observation During Assessment 1  -MS -- 1  -MS -- 0  -MS   Pupil Size 1  -MS -- 1  -MS -- 1  -MS   Anxiety and Irritability 0  -MS -- 0  -MS -- 0  -MS   Bone or Joint Aches: If Patient was Having Pain Previously, Only the Additional Component Attributed to Opiate Withdrawal is Scored 0  -MS -- 0  -MS -- 0  -MS   Gooseflesh Skin 0  -MS -- 0  -MS -- 0  -MS   Runny Nose or Tearing: Not Accounted for by Cold Symptoms or Allergies 0  -MS -- 0  -MS -- 0  -MS   Clinical Opiate Withdrawal Scale Total Score 2  -MS -- 3  -MS -- 3  -MS   Row Name 01/30/23 0700 01/30/23 0518 01/30/23 0500 01/30/23 0316 01/30/23 0300   Clinical Opiate Withdrawal Scale   Pulse 71  -MS 72  -CU 72  -CU 68  -CU 68  -CU   Resting Pulse Rate: Measured After Patient is Sitting or Lying for One Minute 0  -MS -- 0  -CU -- 0  -CU   GI Upset: Over Last Half Hour 0  -MS -- 0  -CU -- 0  -CU   Sweating: Over Past Half Hour Not Accounted for by Room Temperature of Patient Activity 3  -MS -- 1  -CU -- 0  -CU   Tremor: Observation of Outstretched Hands 0  -MS -- 0  -CU -- 0  -CU   Restlessness: Observation During Assessment 0  -MS -- 0  -CU -- 0  -CU   Yawning: Observation During Assessment 0  -MS -- 0  -CU -- 0  -CU   Pupil Size 1  -MS -- 0  -CU -- 0  -CU   Anxiety and Irritability 0  -MS -- 0  -CU -- 0  -CU   Bone or Joint Aches: If Patient was Having Pain Previously, Only the Additional Component Attributed to Opiate Withdrawal is Scored 0  -MS -- 0  -CU -- 0  -CU   Gooseflesh Skin 0  -MS -- 0  -CU -- 0  -CU   Runny Nose or Tearing: Not Accounted for by Cold Symptoms or Allergies 0  -MS -- 0  -CU -- 0  -CU   Clinical Opiate Withdrawal Scale Total Score 4  -MS -- 1  -CU -- 0  -CU   Heart Rate Source Monitor  -MS -- -- -- --   Row Name 01/30/23 0111 01/30/23 0110 01/29/23 2342 01/29/23 2300    Clinical Opiate Withdrawal Scale   Pulse 63  -CU 63  -CU 68  -CU 68  -CU    Resting Pulse Rate: Measured After Patient is Sitting or Lying for One Minute -- 0  -CU -- 0  -CU    GI Upset: Over Last Half Hour -- 0  -CU -- 0  -CU    Sweating: Over Past Half Hour Not Accounted for by Room Temperature of Patient Activity -- 0  -CU -- 0  -CU    Tremor: Observation of Outstretched Hands -- 0  -CU -- 0  -CU    Restlessness: Observation During Assessment -- 0  -CU -- 0  -CU    Yawning: Observation During Assessment -- 0  -CU -- 0  -CU    Pupil Size -- 0  -CU -- 0  -CU    Anxiety and Irritability -- 0  -CU -- 0  -CU    Bone or Joint Aches: If Patient was Having Pain Previously, Only the Additional Component Attributed to Opiate Withdrawal is Scored -- 0  -CU -- 0  -CU    Gooseflesh Skin -- 0  -CU -- 0  -CU    Runny Nose or Tearing: Not Accounted for by Cold Symptoms or Allergies -- 0  -CU -- 0  -CU    Clinical Opiate Withdrawal Scale Total Score -- 0  -CU -- 0  -CU          ED Triage Vitals   Temperature Pulse Respirations Blood Pressure SpO2   01/29/23 2210 01/29/23 2300 01/29/23 2210 01/29/23 2342 01/30/23 0700   97 8 °F (36 6 °C) 68 16 103/57 95 %      Temp Source Heart Rate Source Patient Position - Orthostatic VS BP Location FiO2 (%)   01/29/23 2210 01/30/23 0700 -- 01/30/23 0700 --   Oral Monitor  Left arm       Pain Score       01/29/23 2210       No Pain          Wt Readings from Last 1 Encounters:   01/29/23 78 2 kg (172 lb 4 8 oz)     Additional Vital Signs:   Date/Time Temp Pulse Resp BP SpO2 O2 Device   01/30/23 1300 -- 75 -- -- -- --   01/30/23 1111 -- 69 -- 122/73 -- --   01/30/23 1100 -- 69 -- -- -- --   01/30/23 0903 -- 74 -- 107/59 -- --   01/30/23 0900 -- 74 -- -- -- --   01/30/23 0700 97 4 °F (36 3 °C) Abnormal  71 19 115/63 95 % None (Room air)   01/30/23 0518 -- 72 -- 122/72 -- --   01/30/23 0500 -- 72 -- -- -- --   01/30/23 0316 -- 68 -- 110/59 -- --   01/30/23 0300 -- 68 -- -- -- --   01/30/23 0111 -- 63 -- 102/50 -- --   01/30/23 0110 -- 63 -- -- -- --   01/29/23 2342 -- 68 -- 103/57 -- --   01/29/23 2300 -- 68 -- -- -- --   01/29/23 2210 97 8 °F (36 6 °C) -- 16 -- -- --     Pertinent Labs/Diagnostic Test Results:   US head neck soft tissue   Final Result by Sandra Montiel MD (01/30 1006)      Needle fragment measuring approximately 7 mm long within the subcutaneous/muscular tissues of the right neck with the tip approximately 1 mm from the internal jugular vein              Results from last 7 days   Lab Units 01/29/23  1942   WBC Thousand/uL 15 97*   HEMOGLOBIN g/dL 11 0*   HEMATOCRIT % 34 4*   PLATELETS Thousands/uL 335   NEUTROS ABS Thousands/µL 12 99*     Results from last 7 days   Lab Units 01/29/23 1942   SODIUM mmol/L 135   POTASSIUM mmol/L 4 1   CHLORIDE mmol/L 106   CO2 mmol/L 23   ANION GAP mmol/L 6   BUN mg/dL 14   CREATININE mg/dL 0 69   EGFR ml/min/1 73sq m 118   CALCIUM mg/dL 9 0     Results from last 7 days   Lab Units 01/29/23 1942   AST U/L 38*   ALT U/L 37*   ALK PHOS U/L 88   TOTAL PROTEIN g/dL 7 3   ALBUMIN g/dL 3 7   TOTAL BILIRUBIN mg/dL 0 68     Results from last 7 days   Lab Units 01/30/23  1125   POC GLUCOSE mg/dl 85     Results from last 7 days   Lab Units 01/29/23  1942   GLUCOSE RANDOM mg/dL 108*     Results from last 7 days   Lab Units 01/30/23  0747   TSH 3RD GENERATON uIU/mL 2 996     Results from last 7 days   Lab Units 01/30/23  1126 01/29/23  1943   CLARITY UA   --  Slightly Cloudy*   COLOR UA   --  Brown*   SPEC GRAV UA   --  1 025   PH UA   --  6 0   GLUCOSE UA mg/dl  --  Negative   KETONES UA mg/dl  --  5 (Trace)*   BLOOD UA   --  Negative   PROTEIN UA mg/dl  --  30 (1+)*   NITRITE UA   --  Negative   BILIRUBIN UA   --  1 mg/dL*   UROBILINOGEN UA mg/dL  --  4 0*   LEUKOCYTES UA   --  25 0*   WBC UA /hpf  --  2-4   RBC UA /hpf  --  None Seen   BACTERIA UA /hpf  --  Moderate*   EPITHELIAL CELLS WET PREP /hpf  --  Occasional   MUCUS THREADS   --  Innumerable*   CREATININE UR mg/dL 64 6  --    PROTEIN UR mg/dL 6  --    PROT/CREAT RATIO UR  0 09  --      Results from last 7 days   Lab Units 01/29/23 1942   AMPH/METH  Negative   BARBITURATE UR  Negative   BENZODIAZEPINE UR  Negative   COCAINE UR  Negative   METHADONE URINE  Negative   OPIATE UR  Negative   PCP UR  Negative   THC UR  Negative       Past Medical History:   Diagnosis Date   • Abnormal Pap smear of cervix     2016 abn pap, colpo WNL, cryotherapy; 10/2022 HSIL pap   • Asthma    • Depression    • Preeclampsia 2016       Admitting Diagnosis:  At risk for withdrawal [Z91 89]  Age/Sex: 32 y o  female  Admission Orders:  Scheduled Medications:  No current facility-administered medications for this encounter  Continuous IV Infusions:  No current facility-administered medications for this encounter  PRN Meds:  No current facility-administered medications for this encounter  Network Utilization Review Department  ATTENTION: Please call with any questions or concerns to [phone] and carefully listen to the prompts so that you are directed to the right person  All voicemails are confidential   Berry Morris all requests for admission clinical reviews, approved or denied determinations and any other requests to dedicated fax number below belonging to the campus where the patient is receiving treatment   List of dedicated fax numbers for the Facilities:  [address] DENIALS (Administrative/Medical Necessity) [phone]   [address] (Maternity/NICU/Pediatrics) [phone]   [facility] Layla Dubon [phone]   [address] [phone]   [facility] [place] [address] [facility] [phone]   [facility] [address] [phone]

## 2023-01-30 NOTE — ED PROVIDER NOTES
History  Chief Complaint   Patient presents with   • Detox Evaluation     Patient states she needs to get detox from Fentanyl, uses 2 bundles a day  Last use for 1 hour pta  Patient is currently 29 weeks pregnant     78-year-old female currently pregnant at 28 weeks presenting for detox  Patient reports she uses intravenous fentanyl on a regular basis and uses approximately 5-6 bags patient reports to nursing staff that she uses up to 2 bundles a day  Patient reports her last use was approximately 1 hour ago and reports she used 5 bags  Patient reports she would like to start Suboxone  Patient denies any vaginal bleeding, decreased fetal movement, abdominal pain  Patient denies any other recreational substance use          Prior to Admission Medications   Prescriptions Last Dose Informant Patient Reported? Taking? Prenatal Vit-Fe Fumarate-FA (PRENATAL VITAMINS PO)   Yes No   Sig: Take by mouth   aspirin (ECOTRIN LOW STRENGTH) 81 mg EC tablet   No No   Sig: Take 2 tablets (162 mg total) by mouth daily      Facility-Administered Medications: None       Past Medical History:   Diagnosis Date   • Abnormal Pap smear of cervix      abn pap, colpo WNL, cryotherapy; 10/2022 HSIL pap   • Asthma    • Depression    • Preeclampsia        Past Surgical History:   Procedure Laterality Date   • GYNECOLOGIC CRYOSURGERY  2016       Family History   Problem Relation Age of Onset   • Cancer Neg Hx    • Venous thrombosis Neg Hx    • Diabetes Neg Hx    • Breast cancer Neg Hx    • Colon cancer Neg Hx    • Ovarian cancer Neg Hx      I have reviewed and agree with the history as documented      E-Cigarette/Vaping   • E-Cigarette Use Never User      E-Cigarette/Vaping Substances     Social History     Tobacco Use   • Smoking status: Some Days     Types: Cigarettes     Last attempt to quit: 2022     Years since quittin 7   • Smokeless tobacco: Never   Vaping Use   • Vaping Use: Never used   Substance Use Topics   • Alcohol use: Not Currently     Comment: last used 12/2021   • Drug use: Not Currently     Types: Heroin, Methamphetamines, Fentanyl     Comment: last used 1/28/2022       Review of Systems   Constitutional: Negative for chills, fatigue and fever  HENT: Negative for congestion, ear pain, rhinorrhea and sore throat  Eyes: Negative for redness  Respiratory: Negative for chest tightness and shortness of breath  Cardiovascular: Negative for chest pain and palpitations  Gastrointestinal: Negative for abdominal pain, nausea and vomiting  Genitourinary: Negative for dysuria and hematuria  Musculoskeletal: Negative  Skin: Negative for rash  Neurological: Negative for dizziness, syncope, light-headedness and numbness  Physical Exam  Physical Exam  Vitals and nursing note reviewed  Constitutional:       Appearance: She is well-developed  HENT:      Head: Normocephalic  Eyes:      General: No scleral icterus  Cardiovascular:      Rate and Rhythm: Normal rate and regular rhythm  Pulmonary:      Effort: Pulmonary effort is normal       Breath sounds: Normal breath sounds  No stridor  Abdominal:      General: There is no distension  Palpations: Abdomen is soft  Tenderness: There is no abdominal tenderness  Comments: Gravid uterus   Musculoskeletal:         General: Normal range of motion  Skin:     General: Skin is warm and dry  Capillary Refill: Capillary refill takes less than 2 seconds  Neurological:      Mental Status: She is alert and oriented to person, place, and time           Vital Signs  ED Triage Vitals [01/29/23 1751]   Temperature Pulse Respirations Blood Pressure SpO2   (!) 97 1 °F (36 2 °C) 82 20 126/72 98 %      Temp Source Heart Rate Source Patient Position - Orthostatic VS BP Location FiO2 (%)   Tympanic Monitor Sitting Left arm --      Pain Score       --           Vitals:    01/29/23 1751   BP: 126/72   Pulse: 82   Patient Position - Orthostatic VS: Sitting         Visual Acuity      ED Medications  Medications - No data to display    Diagnostic Studies  Results Reviewed     Procedure Component Value Units Date/Time    Rapid drug screen, urine [[id number]]     Lab Status: No result Specimen: Urine     UA w Reflex to Microscopic w Reflex to Culture [[id number]]     Lab Status: No result Specimen: Urine     CBC and differential [[id number]]     Lab Status: No result Specimen: Blood     Comprehensive metabolic panel [[id number]]     Lab Status: No result Specimen: Blood                  No orders to display              Procedures  Procedures         ED Course                               SBIRT 22yo+    Flowsheet Row Most Recent Value   SBIRT (25 yo +)    In order to provide better care to our patients, we are screening all of our patients for alcohol and drug use  Would it be okay to ask you these screening questions? No Filed at: 01/29/2023 1808                    Medical Decision Making  25-year-old currently pregnant female presents requesting detox admission  Patient unable to be admitted to this facility due to lack of OB/GYN services as the patient has past 20 weeks of pregnancy  Transfer to [address] [address] for detox/opiate use disorder management in pregnancy  No other complaints  Fetal heart tones noted to be 136 via M-mode on informal bedside ultrasound  All imaging and/or lab testing discussed with patient  Patient and/or family members verbalizes understanding and agrees with plan for admission  Patient is stable for admission      Portions of the record may have been created with voice recognition software  Occasional wrong word or "sound a like" substitutions may have occurred due to the inherent limitations of voice recognition software  Read the chart carefully and recognize, using context, where substitutions have occurred            Fentanyl use disorder, moderate ([facility] [address] ): complicated acute illness or injury  Amount and/or Complexity of Data Reviewed  Labs: ordered  Disposition  Final diagnoses:   Fentanyl use disorder, moderate ([address] )     Time reflects when diagnosis was documented in both MDM as applicable and the Disposition within this note     Time User Action Codes Description Comment    1/29/2023  6:40 PM Rob Chavez Add [F11 20] Fentanyl use disorder, moderate [facility])       ED Disposition     ED Disposition   Transfer to Another Facility-In Network    Condition   --    Date/Time   Sun Jan 29, 2023  6:40 PM    Comment   Hannah Portillo should be transferred out to [facility]  MD Documentation    [address] Most Recent Value   Patient Condition The patient has been stabilized such that within reasonable medical probability, no material deterioration of the patient condition or the condition of the unborn child(igor) is likely to result from the transfer   Accepting Physician [address] [place] [address] Street Name,  Selvin Ford    (Name & Tel number) Janina PACS   Transported by [employer] and Unit #) BLS Jocelyn Cowan   Sending MD Dr Molina   Provider Certification General risk, such as traffic hazards, adverse weather conditions, rough terrain or turbulence, possible failure of equipment (including vehicle or aircraft), or consequences of actions of persons outside the control of the transport personnel      RN Documentation    Flowsheet Row Most [facility] Name,  Selvin Ford    (Name & Tel number) [facility] PACS   Transported by (Company and Unit #) BLS SLE   Level of Care Basic life support      Follow-up Information    None         Patient's Medications   Discharge Prescriptions    No medications on file       No discharge procedures on file      PDMP Review     None          ED Provider  Electronically Signed by           Fernando Lyon PA-C  01/29/23 4462

## 2023-01-30 NOTE — PROGRESS NOTES
COWS ASSESSMENT    Resting Pulse Rate    (+0) < or = 80 bpm    (+1)  bpm    (+2) 101-120 bpm    (+4) >120 bpm    Sweating    (+0) No reports of chills or flushing    (+1) Subjective reports of chills or flushing    (+2) Flushed or observable moistness on face    (+3) Beads of sweat on brow of face    (+4) >120 bpm    Restlessness    (+0) Able to sit still    (+1) Reports difficulty sitting still, but is able to do so    (+3) Frequent shifting or extraneous movements of leg/arms    (+5)  Unable to sit still for more than a few seconds    Pupil size    (+0)  Pupils pinned or normal size for room light    (+1)  Pupils possibly larger than normal for room light    (+2)  Pupils moderately dilated    (+5)  Pupils so dilated that only the rim of the iris is visible    Bone or join aches    (+0)  Not present    (+1)  Mild diffuse comfort    (+2)  Patient reports severe diffuse aching of joints/muscles    (+4)  Patient is rubbing joints or muscles and is unable to sit still because of discomfort    Runny nose or tearing    (+0)  Not present    (+1)  Nasal stuffiness or unusually moist eyes    (+2)  Nose running or tearing    (+4)  Nose constantly running or tears streaming down cheeks    GI Upset    (+0) No  GI symptoms    (+1)  Stomach cramps    (+2)  Nausea or loose stool    (+3)  Vomiting or diarrhea    (+5)  Multiple episodes of vomiting or diarrhea    Tremor of outstretched hands    (+0) No tremor    (+1)  Tremor can be felt, but not observed    (+2)  Slight tremor observable    (+4)  Gross tremor or muscle twitching    Yawning    (+0)  No yawning    (+1)  Yawning once or twice during assessment    (+2)  Yawning or more times during assessment    (+4)  Yawning several times/minute    Anxiety or irritability    (+0)  None    (+1)  Patient reports increasing irritability or anxiousness    (+2)  Patient obviously irritable/anxious    (+4)  Patient is so irritable or anxious that participation in the assessment is difficult    Gooseflesh skin    (+0)  Skin is smooth    (+3)  Piloerection of skin can be felt or hairs standing up on arms    (+5) Prominent piloerection    Calculated Cows Score = 0    Interpretation:   <5  no active withdrawal   5-12  mild withdrawal   13-24  moderate withdrawal   25-36  moderately severe withdrawal   >36  severe withdrawal      ~~~~~~~~~~~~~~~~~~~~~~~~~~~~~~~~~~~~~~~~~~~~~~~~~~~~~~~~~~~~~~~~~~~  Note 1: Methadone loading dose 30mg, increase 10mg q6h  Note 2: Buprenorphine/Naloxone (Suboxone) loading dose typically 8mg daily, split 4mg AM/PM, increase 4mg q24h  Buprenorphine is a partial opiate agonist that can precipitate opiate withdrawal if adminstered to a physically dependent patient  In buprenorphine inductionz, it is important that a patient is already in mild to moderate opiate withdrawal prior to administration  This would be equivalen to a COWS score >5-6 (>10 preferable)  This is important as otherwise patients experience precipitated withdrawal, a rapid and intense onset of withdrawal symptoms initiated by the medication   It is not reliable to use "time since last opioid use" since pts are not always truthful in reporting their last use and metabolism varies from pt to pt    Note 3: Assess for opiate withdrawal every 6h or as needed  ~~~~~~~~~~~~~~~~~~~~~~~~~~~~~~~~~~~~~~~~~~~~~~~~~~~~~~~~~~~~~~~~~~~

## 2023-01-31 LAB
AMPHETAMINES UR QL SCN: NEGATIVE NG/ML
BARBITURATES UR QL SCN: NEGATIVE NG/ML
BENZODIAZ UR QL: NEGATIVE NG/ML
BZE UR QL: NEGATIVE NG/ML
CANNABINOIDS UR QL SCN: NEGATIVE NG/ML
CLINICAL COMMENT: NORMAL
GLUCOSE SERPL-MCNC: 136 MG/DL (ref 65–140)
GLUCOSE SERPL-MCNC: 88 MG/DL (ref 65–140)
GLUCOSE SERPL-MCNC: 95 MG/DL (ref 65–140)
HAV AB SER QL IA: NORMAL
HBV SURFACE AB SER-ACNC: <3.1 MIU/ML
METHADONE UR QL SCN: NEGATIVE NG/ML
OPIATES UR QL: NEGATIVE NG/ML
PCP UR QL: NEGATIVE NG/ML
PROPOXYPH UR QL SCN: NEGATIVE NG/ML
T GONDII IGG SERPL IA-ACNC: <3 IU/ML (ref 0–7.1)
T GONDII IGM SER IA-ACNC: <3 AU/ML (ref 0–7.9)

## 2023-01-31 RX ORDER — ONDANSETRON 4 MG/1
4 TABLET, ORALLY DISINTEGRATING ORAL EVERY 6 HOURS PRN
Status: DISCONTINUED | OUTPATIENT
Start: 2023-01-31 | End: 2023-02-03 | Stop reason: HOSPADM

## 2023-01-31 RX ORDER — CYCLOBENZAPRINE HCL 10 MG
10 TABLET ORAL EVERY 8 HOURS
Status: DISCONTINUED | OUTPATIENT
Start: 2023-01-31 | End: 2023-02-03 | Stop reason: HOSPADM

## 2023-01-31 RX ORDER — HYDROXYZINE HYDROCHLORIDE 25 MG/1
25 TABLET, FILM COATED ORAL EVERY 6 HOURS PRN
Status: DISCONTINUED | OUTPATIENT
Start: 2023-01-31 | End: 2023-01-31

## 2023-01-31 RX ORDER — BUPRENORPHINE AND NALOXONE 2; .5 MG/1; MG/1
4 FILM, SOLUBLE BUCCAL; SUBLINGUAL DAILY
Status: DISCONTINUED | OUTPATIENT
Start: 2023-01-31 | End: 2023-02-01

## 2023-01-31 RX ORDER — LOPERAMIDE HYDROCHLORIDE 2 MG/1
2 CAPSULE ORAL EVERY 4 HOURS PRN
Status: DISCONTINUED | OUTPATIENT
Start: 2023-01-31 | End: 2023-02-03 | Stop reason: HOSPADM

## 2023-01-31 RX ORDER — HYDROXYZINE HYDROCHLORIDE 25 MG/1
25 TABLET, FILM COATED ORAL EVERY 6 HOURS
Status: DISCONTINUED | OUTPATIENT
Start: 2023-01-31 | End: 2023-02-03 | Stop reason: HOSPADM

## 2023-01-31 RX ORDER — LANOLIN ALCOHOL/MO/W.PET/CERES
3 CREAM (GRAM) TOPICAL
Status: DISCONTINUED | OUTPATIENT
Start: 2023-01-31 | End: 2023-02-03 | Stop reason: HOSPADM

## 2023-01-31 RX ORDER — GABAPENTIN 100 MG/1
100 CAPSULE ORAL 3 TIMES DAILY
Status: DISCONTINUED | OUTPATIENT
Start: 2023-01-31 | End: 2023-01-31

## 2023-01-31 RX ADMIN — CYCLOBENZAPRINE HYDROCHLORIDE 10 MG: 10 TABLET, FILM COATED ORAL at 23:49

## 2023-01-31 RX ADMIN — CYCLOBENZAPRINE HYDROCHLORIDE 10 MG: 10 TABLET, FILM COATED ORAL at 15:24

## 2023-01-31 RX ADMIN — HYDROXYZINE HYDROCHLORIDE 25 MG: 25 TABLET ORAL at 10:03

## 2023-01-31 RX ADMIN — HYDROXYZINE HYDROCHLORIDE 25 MG: 25 TABLET ORAL at 21:34

## 2023-01-31 RX ADMIN — GABAPENTIN 100 MG: 100 CAPSULE ORAL at 10:09

## 2023-01-31 RX ADMIN — HYDROXYZINE HYDROCHLORIDE 25 MG: 25 TABLET ORAL at 15:24

## 2023-01-31 RX ADMIN — BUPRENORPHINE AND NALOXONE 4 MG: 2; .5 FILM BUCCAL; SUBLINGUAL at 11:05

## 2023-01-31 RX ADMIN — Medication 3 MG: at 21:34

## 2023-01-31 NOTE — PROGRESS NOTES
Progress Note  Rad Herrera 32 y o  female MRN: [id number]  Unit/Bed#: L&D 327-01 Encounter: [id number]    Assessment:  32 y o  Baudilio Mendiola at 28w5d admitted for for MAT initiation with suboxone therapy in the setting of OUD, last use on 1/29/23  Hospital day 2:    Plan:  * Substance abuse disorder affecting pregnancy in third trimester, antepartum  Assessment & Plan  · Fentanyl and heroin are drugs of choice, last use at 1600 on 1/29; 5-6 bags of fentanyl  · Currently lives in a recovery home with actively using tenants, has been using again for about 1 month  · CM consulted to help with housing  · Initial rapid UDS negative, fentanyl pending  · Injection sites visibile on R lateral neck and L dorsum of hand  · Monitoring COWS scores score q2h  · Plan to initiate therapy when score >/= 12  · Current symptoms/PRN med:  · Restless legs - atarax 25mg PO q6  · Nausea - zofran PO q6    Rubella non-immune status, antepartum  Assessment & Plan  Plan for MMR postpartum    Hepatitis C antibody test positive  Assessment & Plan  · Follow up Hep C RNA quant  · Hep A and B negative for acute infection  · Hep A antibody and Hep B surface antibody also ordered to determine immunity    Foreign body of skin of neck  Assessment & Plan  Needle tip in superficial tissue of R lateral neck  US showed 7mm needle tip 1mm from IJ vein, gen surg evaluated and do not recommend surgical intervantion    Pregnancy affected by fetal growth restriction  Assessment & Plan  CMV and toxo ordered    28 weeks gestation of pregnancy  Assessment & Plan  NST TID  Denies ctx, loss of fluid, vaginal bleeding, and reports good fetal movement  Prenatal labs ordered  Fasting and 2hr postprandial glucose checks in place of GTT  Rhogam and tdap given 1/30      Subjective/Objective   Subjective:     Contractions: no  Loss of fluid: no  Vaginal bleeding: no  Fetal movement: no    Currently reports feeling hot and nauseated   Also having restless legs which is making it difficult for her to sleep  No other bothersome symptoms currently  No OB complaints  Objective:     Vitals:   Temp:  [97 4 °F (36 3 °C)-98 1 °F (36 7 °C)] 97 6 °F (36 4 °C)  HR:  [69-91] 89  Resp:  [16-20] 18  BP: (107-129)/(59-78) 122/61       Physical Exam  Vitals reviewed  Constitutional:       General: She is not in acute distress  Appearance: She is normal weight  HENT:      Mouth/Throat:      Mouth: Mucous membranes are moist       Pharynx: Oropharynx is clear  Neck:      Comments: Injection site with erythema/induration on neck above right clavicle, palpable cords  Cardiovascular:      Rate and Rhythm: Normal rate  Pulses: Normal pulses  Pulmonary:      Effort: Pulmonary effort is normal  No respiratory distress  Abdominal:      Palpations: Abdomen is soft  Comments: gravid   Skin:     General: Skin is warm and dry  Neurological:      General: No focal deficit present  Mental Status: She is alert and oriented to person, place, and time  Mental status is at baseline  Fetal Assessment:  FHT: 150 / Moderate 6 - 25 bpm / 15x15, reactive  [place]: none    Lab, Imaging and other studies: I have personally reviewed pertinent reports        Lab Results   Component Value Date    WBC 15 97 (H) 01/29/2023    HGB 11 0 (L) 01/29/2023    HCT 34 4 (L) 01/29/2023    MCV 92 01/29/2023     01/29/2023       Lab Results   Component Value Date    CALCIUM 9 0 01/29/2023    K 4 1 01/29/2023    CO2 23 01/29/2023     01/29/2023    BUN 14 01/29/2023    CREATININE 0 69 01/29/2023       Lab Results   Component Value Date    ALT 37 (H) 01/29/2023    AST 38 (H) 01/29/2023    ALKPHOS 88 01/29/2023       Yolie Medina MD  OBGYN, PGY-2  1/31/2023  6:31 AM

## 2023-01-31 NOTE — PROGRESS NOTES
Pt not eating due to nausea so far today  Pt declining antinausea meds at this time   Dr Mary Garnica and Dr Radha Cardenas notified and instructed not to take blood glucose level unless the patient eats a meal

## 2023-01-31 NOTE — CONSULTS
Consultation - Maternal Fetal Medicine   Kris Gallagher 32 y o  female MRN: [id number]  Unit/Bed#: L&D 327-01 Encounter: [id number]  Admit date: 2023  Today's date: 23    Assessment/Plan   Ms Piyush Lundberg is a 32y o  year-old  at [address] Day: 2, admitted for fentanyl addiction and request for conversion to suboxone  By issue:      Chief Complaint   Patient presents with   • Withdrawal - Drug         Opioid use disorder (OUD) is a pattern of opioid use characterized by tolerance, craving, inability to control use, and continued use despite adverse consequences  OUD is a chronic, treatable disease that can be managed successfully by combining medications with behavioral therapy and recovery support, which enables those with opioid use disorder to regain control of their health and their lives  Short-term treatment programs aimed at abstinence are associated with high relapse rates and generally do not facilitate patients’ stable long-term recovery  In pregnancy, opioid agonist pharmacotherapy or Medication Assisted Treatment (MAT) is the recommended therapy and is preferable to medically supervised withdrawal because withdrawal is associated with high relapse rates, which lead to worse outcomes  Chronic untreated addiction to opiates is associated with lack of prenatal care, increased risk of fetal growth restriction, abruptio placentae, fetal death, and  labor, and intrauterine passage of meconium; it is also associated with high-risk activities such as prostitution, trading sex for drugs, and criminal activities which exposes women to STIs, violence, and legal consequences, including loss of child custody, criminal proceedings, or incarceration  MAT is accomplished with either buprenorphine with or without naloxone (Subutex or Suboxone) or methadone depending on patient preference as well as psychosocial and logistic factors     Both can be initiated either inpatient or outpatient though methadone must be dosed daily at a registered opioid treatment program   MAT prevents opioid withdrawal symptoms and is shown to prevent complications of nonmedical opioid use by reducing relapse risk and its associated consequences  It also improves adherence to prenatal care and addiction treatment programs  Opioid agonist pharmacotherapy in combination with prenatal care has been demonstrated to reduce the risk of obstetric complications  Infants born to women who used opioids during pregnancy should be monitored by a pediatric care provider for  abstinence syndrome (LOBITO), an expected and treatable non-fatal drug withdrawal syndrome that opioid-exposed neonates may experience shortly after birth  Breastfeeding should be encouraged in women who are stable on their opioid agonists, who are not using illicit drugs, and who have no other contraindications  In general, a coordinated multidisciplinary approach without criminal sanctions has the best chance of helping infants and families  The [address] [place] [address] (Maternal Opiate Medical Support) Team is a multidiscipinary team of professionals who recognize opiate use disorder (OUD) as a significant threat to the health of pregnant and postpartum patients  We aim to connect patients with medication assisted treatment (MAT) and behavioral counseling, prevent maternal relapse and overdose deaths, initiate and maintain MAT without detoxification, and promote risk reduction strategies (Narcan)  MAT is begun either inpatient or office-prescribed (home-initiated)  Inpatients or Emergency Department patients can be seen /  Outpatient, we aim to see a patient within 24h of referral   Outpatient prescriptions will be provided with ongoing [facility] prenatal care only  Postpartum, we aim to assist with transition to a stable, affordable, and appropriate provider    MAT prescribing in pregnancy is short term (max 2 week prescription) with frequent in-office visits supplemental to routine obstetric care  Our  center can be reached at [phone] for appointment to either Dr Rahul Castaneda, Dr Sophie Lebron or Dr Nghia Holman, Dr Nancy Zhang and the inpatient resident on-call for Obstetrics can be paged  for a pregnant patient with OUD  There are also 5 physicians in the OB/GYN department (Dr Fidelina Beck, Dr Rivas Ortiz, Dr Chava Yen, Dr Cameron Weinstein, and Dr Jesse Crsytal) who are waivered to prescribe as well  Recommend suboxone as per our protocol when she is in moderate withdraw with a cows score of 12  An opioid agreement was read with her and signed with her  Hamzah Palma MD        Physician Requesting Consult: Cameron Weinstein MD  Reason for Consult / Principal Problem: fentanyl addiction - using 5-6 bags per day   Subspeciality: Perinatology    Dear Dr Nolberto Shane,    Thank you for referring patient Jas Gallagher for Maternal-Fetal Medicine consultation regarding fentanyl addiction  HPI: As you know, Ms Bridget Clinton is a 32y o  year-old  with an MIO of Estimated Date of Delivery: 23 at 28w5d, Hospital Day: 2, admitted for opiate addiction  Her current pregnancy is significant for IUGR found 23  Her obstetrical history is significant for gestational hypertension in a prior pregnancy  Her partner was recently released from prison and is also addicted to fentanyl and when he dropped her here to "get clean" he admitted himself to a drug detox center  Her prior child is under the care of her sister and her father and she sees her daughter on weekends at her dads house  Other obstetric review of symptoms:  Contractions: None  Leakage of fluid: None  Bleeding: None  Fetal movement: present  Pertinent items are noted in HPI    Review of Systems    Other history is as follows:    Historical Information   OB History    Para Term  AB Living   2 1 1 0 0 1 SAB IAB Ectopic Multiple Live Births   0 0 0 0 1      # Outcome Date GA Lbr Tommy/2nd Weight Sex Delivery Anes PTL Lv   2 Current            1 Term 16 39w4d 11:45 / 00:26 3220 g (7 lb 1 6 oz) F Vag-Spont EPI N DAVONTE      Complications: Gestational hypertension     Gynecologic history: Patient's last menstrual period was 2022  Past Medical History:   Diagnosis Date   • Abnormal Pap smear of cervix     2016 abn pap, colpo WNL, cryotherapy; 10/2022 HSIL pap   • Asthma    • Depression    • Preeclampsia 2016     Past Surgical History:   Procedure Laterality Date   • GYNECOLOGIC CRYOSURGERY  2016     Social History   Social History     Substance and Sexual Activity   Alcohol Use Not Currently    Comment: last used 2021     Social History     Substance and Sexual Activity   Drug Use Not Currently   • Types: Heroin, Methamphetamines, Fentanyl    Comment: last used 2023     Social History     Tobacco Use   Smoking Status Some Days   • Types: Cigarettes   • Last attempt to quit: 2022   • Years since quittin 7   Smokeless Tobacco Never     Family History: non-contributory    Meds/Allergies   Prior to Admission Medications   Prescriptions Last Dose Informant Patient Reported? Taking?    Prenatal Vit-Fe Fumarate-FA (PRENATAL VITAMINS PO) 2023  Yes Yes   Sig: Take by mouth   aspirin (ECOTRIN LOW STRENGTH) 81 mg EC tablet 2023  No Yes   Sig: Take 2 tablets (162 mg total) by mouth daily      Facility-Administered Medications: None     Medication Administration - last 24 hours from 2023 to 2023       Date/Time Order Dose Route Action Action by     2023 1112 EST Rho(D) immune globulin (RHOGAM ULTRA-FILTERED PLUS) IM injection 300 mcg 300 mcg Intramuscular Given Francis Manning RN     2023 1115 EST tetanus-diphtheria-acellular pertussis (BOOSTRIX) IM injection 0 5 mL 0 5 mL Intramuscular Given Francis Manning RN        No current facility-administered medications for this encounter  No Known Allergies    Objective    Patient Vitals for the past 24 hrs:   BP Temp Temp src Pulse Resp SpO2   01/30/23 1857 129/66 -- -- 88 -- --   01/30/23 1800 -- -- -- 88 -- --   01/30/23 1700 -- -- -- 77 -- --   01/30/23 1509 128/73 -- -- 71 -- --   01/30/23 1500 -- 98 1 °F (36 7 °C) Oral 71 16 --   01/30/23 1306 128/75 -- -- 75 -- --   01/30/23 1300 -- -- -- 75 -- --   01/30/23 1111 122/73 -- -- 69 -- --   01/30/23 1100 -- -- -- 69 -- --   01/30/23 0903 107/59 -- -- 74 -- --   01/30/23 0900 -- -- -- 74 -- --   01/30/23 0713 -- -- -- 74 -- 95 %   01/30/23 0712 -- -- -- 74 -- 94 %   01/30/23 0709 115/63 -- -- 71 -- --   01/30/23 0706 119/63 -- -- 71 -- --   01/30/23 0700 115/63 (!) 97 4 °F (36 3 °C) Oral 71 19 95 %   01/30/23 0518 122/72 -- -- 72 -- --   01/30/23 0500 -- -- -- 72 -- --   01/30/23 0316 110/59 -- -- 68 -- --   01/30/23 0300 -- -- -- 68 -- --   01/30/23 0111 102/50 -- -- 63 -- --   01/30/23 0110 -- -- -- 63 -- --   01/29/23 2342 103/57 -- -- 68 -- --   01/29/23 2300 -- -- -- 68 -- --   01/29/23 2210 -- 97 8 °F (36 6 °C) Oral -- 16 --     Vitals: Blood pressure 129/66, pulse 88, temperature 98 1 °F (36 7 °C), temperature source Oral, resp  rate 16, last menstrual period 07/20/2022, SpO2 95 %, not currently breastfeeding  There is no height or weight on file to calculate BMI  Physical Exam  Constitutional:       Appearance: Normal appearance  She is normal weight  HENT:      Head: Normocephalic  Nose: Nose normal  No congestion or rhinorrhea  Mouth/Throat:      Mouth: Mucous membranes are moist    Eyes:      Conjunctiva/sclera: Conjunctivae normal    Cardiovascular:      Rate and Rhythm: Normal rate and regular rhythm  Heart sounds: Murmur heard  No friction rub  No gallop  Comments: 2+ systolic ejection murmur  Pulmonary:      Effort: Pulmonary effort is normal       Breath sounds: Normal breath sounds  No wheezing or rales     Abdominal: General: Abdomen is flat  There is no distension  Palpations: Abdomen is soft  Tenderness: There is no abdominal tenderness  There is no guarding  Comments: gravid   Musculoskeletal:         General: Normal range of motion  Right lower leg: No edema  Skin:     General: Skin is warm and dry  Coloration: Skin is not jaundiced or pale  Neurological:      General: No focal deficit present  Mental Status: She is alert and oriented to person, place, and time  Mental status is at baseline  Psychiatric:         Mood and Affect: Mood normal          Behavior: Behavior normal          Thought Content: Thought content normal          Judgment: Judgment normal      COWS score  0     Prenatal Labs:   Blood Type:   Lab Results   Component Value Date    ABO A 01/30/2023     , D (Rh type):   Lab Results   Component Value Date    RH Negative 01/30/2023     , Antibody Screen: No results found for: ANTIBODYSCR , HCT/HGB: No results found for: HCT, HGB   , Platelets: No results found for: PLATELETS   , 1 hour Glucola: No results found for: GLUCOLA, 3 hour GTT: No results found for: BBWCMYX2PA, Rubella:   Lab Results   Component Value Date    RUBELLAIGGQT 7 3 (L) 01/30/2023        , VDRL/RPR:   Lab Results   Component Value Date    RPR Non-Reactive 01/30/2023      , Hep B:   Lab Results   Component Value Date    HEPBSAG Non-reactive 01/30/2023    HEPBSAG Non-reactive 01/30/2023     , HIV neg:  Group B Strep:  No results found for: STREPGRPB     Hep C positive and the quant is pending  Screening for diabetes is through finger sticks   CmV     Results from last 7 days   Lab Units 01/29/23 1942   WBC Thousand/uL 15 97*   NEUTROS ABS Thousands/µL 12 99*   HEMOGLOBIN g/dL 11 0*   MCV fL 92   PLATELETS Thousands/uL 335     Results from last 7 days   Lab Units 01/29/23 1942   NEUTROS PCT % 81*   MONOS PCT % 4   EOS PCT % 1     Results from last 7 days   Lab Units 01/29/23 1942   POTASSIUM mmol/L 4 1 CHLORIDE mmol/L 106   CO2 mmol/L 23   BUN mg/dL 14   CREATININE mg/dL 0 69   EGFR ml/min/1 73sq m 118     Results from last 7 days   Lab Units 01/29/23  1942   AST U/L 38*   ALT U/L 37*   ALK PHOS U/L 88     Results from last 7 days   Lab Units 01/29/23  1942   PLATELETS Thousands/uL 335     Results from last 7 days   Lab Units 01/30/23  1125   POC GLUCOSE mg/dl 85     Results from last 7 days   Lab Units 01/30/23  1126   PROT/CREAT RATIO UR  0 09                   Fetal data:  145 reactive with 15 beat criteria  [facility] ultrasound report key findings:1/27/23  2lb 4 oz 8%, symmetric, vertex and LVP 4 6 normal dopplers    Imaging, EKG, Pathology, and Other Studies: none      Visit charge 20 min to review records and 20 min face to face and 30 min to complete consult       Marline Canavan, MD  1/30/2023  8:16 PM

## 2023-01-31 NOTE — PLAN OF CARE
Problem: PAIN - ADULT  Goal: Verbalizes/displays adequate comfort level or baseline comfort level  Description: Interventions:  - Encourage patient to monitor pain and request assistance  - Assess pain using appropriate pain scale  - Administer analgesics based on type and severity of pain and evaluate response  - Implement non-pharmacological measures as appropriate and evaluate response  - Consider cultural and social influences on pain and pain management  - Notify physician/advanced practitioner if interventions unsuccessful or patient reports new pain  1/30/2023 2312 by Maximliiano Chavira RN  Outcome: Progressing  1/30/2023 2312 by Maximiliano Chavira RN  Outcome: Progressing     Problem: INFECTION - ADULT  Goal: Absence or prevention of progression during hospitalization  Description: INTERVENTIONS:  - Assess and monitor for signs and symptoms of infection  - Monitor lab/diagnostic results  - Monitor all insertion sites, i e  indwelling lines, tubes, and drains  - Monitor endotracheal if appropriate and nasal secretions for changes in amount and color  - [place] appropriate cooling/warming therapies per order  - Administer medications as ordered  - Instruct and encourage patient and family to use good hand hygiene technique  - Identify and instruct in appropriate isolation precautions for identified infection/condition  1/30/2023 2312 by Maximiliano Chavira RN  Outcome: Progressing  1/30/2023 2312 by Maximiliano Chavira RN  Outcome: Progressing  Goal: Absence of fever/infection during neutropenic period  Description: INTERVENTIONS:  - Monitor WBC    1/30/2023 2312 by Maximiliano Chavira RN  Outcome: Progressing  1/30/2023 2312 by Maximiliano Chavira RN  Outcome: Progressing     Problem: SAFETY ADULT  Goal: Patient will remain free of falls  Description: INTERVENTIONS:  - Educate patient/family on patient safety including physical limitations  - Instruct patient to call for assistance with activity   - Consult OT/PT to assist with strengthening/mobility - Keep Call bell within reach  - Keep bed low and locked with side rails adjusted as appropriate  - Keep care items and personal belongings within reach  - Initiate and maintain comfort rounds  - Make Fall Risk Sign visible to staff  - Offer Toileting every  Hours, in advance of need  - Initiate/Maintain alarm  - Obtain necessary fall risk management equipment:   - Apply yellow socks and bracelet for high fall risk patients  - Consider moving patient to room near nurses station  1/30/2023 2312 by Rubia Skelton RN  Outcome: Progressing  1/30/2023 2312 by Rubia Skelton RN  Outcome: Progressing  Goal: Maintain or return to baseline ADL function  Description: INTERVENTIONS:  -  Assess patient's ability to carry out ADLs; assess patient's baseline for ADL function and identify physical deficits which impact ability to perform ADLs (bathing, care of mouth/teeth, toileting, grooming, dressing, etc )  - Assess/evaluate cause of self-care deficits   - Assess range of motion  - Assess patient's mobility; develop plan if impaired  - Assess patient's need for assistive devices and provide as appropriate  - Encourage maximum independence but intervene and supervise when necessary  - Involve family in performance of ADLs  - Assess for home care needs following discharge   - Consider OT consult to assist with ADL evaluation and planning for discharge  - Provide patient education as appropriate  1/30/2023 2312 by Rubia Skelton RN  Outcome: Progressing  1/30/2023 2312 by Rubia Skelton RN  Outcome: Progressing  Goal: Maintains/Returns to pre admission functional level  Description: INTERVENTIONS:  - Perform BMAT or MOVE assessment daily    - Set and communicate daily mobility goal to care team and patient/family/caregiver  - Collaborate with rehabilitation services on mobility goals if consulted  - Perform Range of Motion  times a day  - Reposition patient every  hours    - Dangle patient times a day  - Stand patient  times a day  - Ambulate patient  times a day  - Out of bed to chair  times a day   - Out of bed for meals  times a day  - Out of bed for toileting  - Record patient progress and toleration of activity level   1/30/2023 2312 by Carmen Bettencourt RN  Outcome: Progressing  1/30/2023 2312 by Carmen Bettencourt RN  Outcome: Progressing     Problem: DISCHARGE PLANNING  Goal: Discharge to home or other facility with appropriate resources  Description: INTERVENTIONS:  - Identify barriers to discharge w/patient and caregiver  - Arrange for needed discharge resources and transportation as appropriate  - Identify discharge learning needs (meds, wound care, etc )  - Arrange for interpretive services to assist at discharge as needed  - Refer to Case Management Department for coordinating discharge planning if the patient needs post-hospital services based on physician/advanced practitioner order or complex needs related to functional status, cognitive ability, or social support system  1/30/2023 2312 by Carmen Bettencourt RN  Outcome: Progressing  1/30/2023 2312 by Carmen Bettencourt RN  Outcome: Progressing     Problem: Knowledge Deficit  Goal: Patient/family/caregiver demonstrates understanding of disease process, treatment plan, medications, and discharge instructions  Description: Complete learning assessment and assess knowledge base    Interventions:  - Provide teaching at level of understanding  - Provide teaching via preferred learning methods  1/30/2023 2312 by Carmen Bettencourt RN  Outcome: Progressing  1/30/2023 2312 by Carmen Bettencourt RN  Outcome: Progressing     Problem: ANTEPARTUM  Goal: Maintain pregnancy as long as maternal and/or fetal condition is stable  Description: INTERVENTIONS:  - Maternal surveillance  - Fetal surveillance  - Monitor uterine activity  - Medications as ordered  - Bedrest  1/30/2023 2312 by Carmen Bettencourt RN  Outcome: Progressing  1/30/2023 2312 by Carmen Bettencourt RN  Outcome: Progressing

## 2023-01-31 NOTE — CASE MANAGEMENT
Case Management Progress Note    Patient name Licha Screen  Location L&D 327/L&D 798-38 MRN [id number]  : 1995 Date 2023       LOS (days): 2  Geometric Mean LOS (GMLOS) (days):   Days to GMLOS:        OBJECTIVE:        Current admission status: Inpatient  Preferred Pharmacy:   [address] [place] [address] #[id number] [address] [facility]  Phone: [phone] Fax: [phone]    Primary Care Provider: DESMOND Bermudez    Primary Insurance: [email or web address]  Secondary Insurance:     PROGRESS NOTE:    CM received call from Mervat Trivedi at [facility] requesting that clinicals be re-faxed  Mervat Trivedi also stated that patient did not answer cell or room phone yesterday to complete assessment  When [facility] called patient again this morning, patient requested they call back at noon and hung up  CM relayed this to OBGYN and requested that they stress importance of intake to patient as she is in need of an OP Suboxone provider  Suboxone started today per OBGYN

## 2023-01-31 NOTE — CASE MANAGEMENT
Case Management Progress Note    Patient name Thanh Neal  Location L&D 327/L&D 151-54 MRN [id number]  : 1995 Date 2023       LOS (days): 2  Geometric Mean LOS (GMLOS) (days):   Days to GMLOS:        OBJECTIVE:        Current admission status: Inpatient  Preferred Pharmacy:   [facility] #[id number] VINCENT Ulloa [address] [facility]  Phone: [phone] Fax: [phone]    Primary Care Provider: DESMOND Santos    Primary Insurance: Viridiana Christian  Secondary Insurance:     PROGRESS NOTE:    HOST called CM after intake attempt w/ patient around noon - reported that patient hung up after HOST rep introduced themselves  CM requested that HOST try again this afternoon, and if patient was not feeling up to it, to try again tomorrow  HOST in agreement with this plan  CM spoke with OBGYN again, who reported that they did stress importance of completing intake with patient  Patient reportedly expressed understanding that this intake is necessary if she wants to continue with Suboxone after dc

## 2023-02-01 LAB
BACTERIA UR CULT: ABNORMAL
CMV IGG SERPL IA-ACNC: <0.6 U/ML (ref 0–0.59)
CMV IGM SERPL IA-ACNC: <30 AU/ML (ref 0–29.9)
GLUCOSE SERPL-MCNC: 110 MG/DL (ref 65–140)
GLUCOSE SERPL-MCNC: 96 MG/DL (ref 65–140)
HCV RNA SERPL NAA+PROBE-ACNC: NORMAL IU/ML
HCV RNA SERPL NAA+PROBE-LOG IU: 5.29 LOG10 IU/ML
TEST INFORMATION: NORMAL

## 2023-02-01 RX ORDER — BUPRENORPHINE AND NALOXONE 2; .5 MG/1; MG/1
4 FILM, SOLUBLE BUCCAL; SUBLINGUAL ONCE
Status: COMPLETED | OUTPATIENT
Start: 2023-02-01 | End: 2023-02-01

## 2023-02-01 RX ORDER — BUPRENORPHINE AND NALOXONE 8; 2 MG/1; MG/1
8 FILM, SOLUBLE BUCCAL; SUBLINGUAL DAILY
Status: DISCONTINUED | OUTPATIENT
Start: 2023-02-01 | End: 2023-02-03 | Stop reason: HOSPADM

## 2023-02-01 RX ADMIN — HYDROXYZINE HYDROCHLORIDE 25 MG: 25 TABLET ORAL at 22:30

## 2023-02-01 RX ADMIN — CYCLOBENZAPRINE HYDROCHLORIDE 10 MG: 10 TABLET, FILM COATED ORAL at 07:34

## 2023-02-01 RX ADMIN — HYDROXYZINE HYDROCHLORIDE 25 MG: 25 TABLET ORAL at 10:29

## 2023-02-01 RX ADMIN — HEPATITIS B VACCINE (RECOMBINANT) 20 MCG: 20 INJECTION, SUSPENSION INTRAMUSCULAR at 09:28

## 2023-02-01 RX ADMIN — HYDROXYZINE HYDROCHLORIDE 25 MG: 25 TABLET ORAL at 16:25

## 2023-02-01 RX ADMIN — CYCLOBENZAPRINE HYDROCHLORIDE 10 MG: 10 TABLET, FILM COATED ORAL at 22:30

## 2023-02-01 RX ADMIN — BUPRENORPHINE AND NALOXONE 8 MG: 8; 2 FILM BUCCAL; SUBLINGUAL at 09:28

## 2023-02-01 RX ADMIN — BUPRENORPHINE AND NALOXONE 4 MG: 2; .5 FILM BUCCAL; SUBLINGUAL at 00:12

## 2023-02-01 RX ADMIN — Medication 3 MG: at 22:30

## 2023-02-01 RX ADMIN — HEPATITIS A VACCINE 1 ML: 1440 INJECTION, SUSPENSION INTRAMUSCULAR at 10:29

## 2023-02-01 RX ADMIN — HYDROXYZINE HYDROCHLORIDE 25 MG: 25 TABLET ORAL at 03:52

## 2023-02-01 RX ADMIN — CYCLOBENZAPRINE HYDROCHLORIDE 10 MG: 10 TABLET, FILM COATED ORAL at 15:41

## 2023-02-01 NOTE — PLAN OF CARE
Problem: PAIN - ADULT  Goal: Verbalizes/displays adequate comfort level or baseline comfort level  Description: Interventions:  - Encourage patient to monitor pain and request assistance  - Assess pain using appropriate pain scale  - Administer analgesics based on type and severity of pain and evaluate response  - Implement non-pharmacological measures as appropriate and evaluate response  - Consider cultural and social influences on pain and pain management  - Notify physician/advanced practitioner if interventions unsuccessful or patient reports new pain  Outcome: Progressing     Problem: INFECTION - ADULT  Goal: Absence or prevention of progression during hospitalization  Description: INTERVENTIONS:  - Assess and monitor for signs and symptoms of infection  - Monitor lab/diagnostic results  - Monitor all insertion sites, i e  indwelling lines, tubes, and drains  - Monitor endotracheal if appropriate and nasal secretions for changes in amount and color  - [place] appropriate cooling/warming therapies per order  - Administer medications as ordered  - Instruct and encourage patient and family to use good hand hygiene technique  - Identify and instruct in appropriate isolation precautions for identified infection/condition  Outcome: Progressing  Goal: Absence of fever/infection during neutropenic period  Description: INTERVENTIONS:  - Monitor WBC    Outcome: Progressing     Problem: SAFETY ADULT  Goal: Patient will remain free of falls  Description: INTERVENTIONS:  - Educate patient/family on patient safety including physical limitations  - Instruct patient to call for assistance with activity   - Consult OT/PT to assist with strengthening/mobility   - Keep Call bell within reach  - Keep bed low and locked with side rails adjusted as appropriate  - Keep care items and personal belongings within reach  - Initiate and maintain comfort rounds  - Make Fall Risk Sign visible to staff  - Offer Toileting every  Hours, in advance of need  - Initiate/Maintain alarm  - Obtain necessary fall risk management equipment:  - Apply yellow socks and bracelet for high fall risk patients  - Consider moving patient to room near nurses station  Outcome: Progressing  Goal: Maintain or return to baseline ADL function  Description: INTERVENTIONS:  -  Assess patient's ability to carry out ADLs; assess patient's baseline for ADL function and identify physical deficits which impact ability to perform ADLs (bathing, care of mouth/teeth, toileting, grooming, dressing, etc )  - Assess/evaluate cause of self-care deficits   - Assess range of motion  - Assess patient's mobility; develop plan if impaired  - Assess patient's need for assistive devices and provide as appropriate  - Encourage maximum independence but intervene and supervise when necessary  - Involve family in performance of ADLs  - Assess for home care needs following discharge   - Consider OT consult to assist with ADL evaluation and planning for discharge  - Provide patient education as appropriate  Outcome: Progressing  Goal: Maintains/Returns to pre admission functional level  Description: INTERVENTIONS:  - Perform BMAT or MOVE assessment daily    - Set and communicate daily mobility goal to care team and patient/family/caregiver  - Collaborate with rehabilitation services on mobility goals if consulted  - Perform Range of Motion  times a day  - Reposition patient every  hours    - Dangle patient  times a day  - Stand patient  times a day  - Ambulate patient  times a day  - Out of bed to chair  times a day   - Out of bed for meals  times a day  - Out of bed for toileting  - Record patient progress and toleration of activity level   Outcome: Progressing     Problem: DISCHARGE PLANNING  Goal: Discharge to home or other facility with appropriate resources  Description: INTERVENTIONS:  - Identify barriers to discharge w/patient and caregiver  - Arrange for needed discharge resources and transportation as appropriate  - Identify discharge learning needs (meds, wound care, etc )  - Arrange for interpretive services to assist at discharge as needed  - Refer to Case Management Department for coordinating discharge planning if the patient needs post-hospital services based on physician/advanced practitioner order or complex needs related to functional status, cognitive ability, or social support system  Outcome: Progressing     Problem: Knowledge Deficit  Goal: Patient/family/caregiver demonstrates understanding of disease process, treatment plan, medications, and discharge instructions  Description: Complete learning assessment and assess knowledge base    Interventions:  - Provide teaching at level of understanding  - Provide teaching via preferred learning methods  Outcome: Progressing     Problem: ANTEPARTUM  Goal: Maintain pregnancy as long as maternal and/or fetal condition is stable  Description: INTERVENTIONS:  - Maternal surveillance  - Fetal surveillance  - Monitor uterine activity  - Medications as ordered  - Bedrest  Outcome: Progressing

## 2023-02-01 NOTE — CASE MANAGEMENT
Case Management Progress Note    Patient name Colonel Garrido  Location L&D 327/L&D 744-82 MRN [id number]  : 1995 Date 2023       LOS (days): 3  Geometric Mean LOS (GMLOS) (days):   Days to GMLOS:        OBJECTIVE:        Current admission status: Inpatient  Preferred Pharmacy:   [facility] #[id number] VINCENT Salmon [address] [facility]  Phone: [phone] Fax: [phone]    Primary Care Provider: DESMOND Mcarthur    Primary Insurance: Costaruna Parfranco  Secondary Insurance:     PROGRESS NOTE:    CM received follow up call from HOST who confirmed they were able to complete intake with patient today  Plan is for patient to have intake for OP D&A rehab/counseling through [place], has a virtual appointment on  at [address] [place] [address]  Patient is agreeable to getting her Suboxone through the [facility] office in [facility], HOST sent a referral to them to schedule her with a provider  HOST to call CM back when patient is established with the [place] office

## 2023-02-01 NOTE — PROGRESS NOTES
Progress Note - Maternal Fetal Medicine   Maral Gallagher 32 y o  female MRN: [id number]  Unit/Bed#: L&D 327-01 Encounter: [id number]  Admit date: 1/29/2023  Today's date: 02/01/23    Assessment/Plan     Ms Prema Palma is a 32 y o  Chelsea Pummel at 29w0d, Hospital Day: 4, admitted with opiate withdrawal   By issue:    Patient received 8 mg of Suboxone overnight  Will receive 8 mg at 9 AM this morning and can continue to receive 2 mg microdoses every 4 hours as needed for withdrawal symptoms to determine optimal dose  Subjective    Contractions: no  Loss of fluid: no  Vaginal bleeding: no  Fetal movement: yes    Pain: no  Headaches: no  Visual changes: no  Chest pain: no  Shortness of breath: no  Nausea: no  Vomiting/Diarrhea: no  Dysuria: no  Leg pain: no          Objective      Patient Vitals for the past 24 hrs:   BP Temp Temp src Pulse Resp   02/01/23 0731 118/81 97 7 °F (36 5 °C) Oral 100 --   02/01/23 0356 137/78 97 6 °F (36 4 °C) Oral 73 18   01/31/23 2351 132/76 97 6 °F (36 4 °C) Oral 75 18   01/31/23 2347 -- -- -- 75 --   01/31/23 1934 128/70 97 8 °F (36 6 °C) Oral 101 18   01/31/23 1723 -- -- -- 92 --   01/31/23 1447 -- -- -- 96 --   01/31/23 1400 -- -- -- 96 --   01/31/23 0958 -- -- -- 103 --   01/31/23 0950 128/79 98 2 °F (36 8 °C) Oral 103 18   01/31/23 0900 128/79 98 °F (36 7 °C) Oral 70 20       Physical Exam patient comfortable lying in bed without complaints this morning  I/O     None          Invasive Devices     Peripheral Intravenous Line  Duration           Peripheral IV 01/29/23 Dorsal (posterior); Right Hand 2 days                Results from last 7 days   Lab Units 01/29/23  1942   WBC Thousand/uL 15 97*   NEUTROS ABS Thousands/µL 12 99*   HEMOGLOBIN g/dL 11 0*   MCV fL 92   PLATELETS Thousands/uL 335     Results from last 7 days   Lab Units 01/29/23  1942   POTASSIUM mmol/L 4 1   CHLORIDE mmol/L 106   CO2 mmol/L 23   BUN mg/dL 14   CREATININE mg/dL 0 69   EGFR ml/min/1 73sq m 118     Results from last 7 days   Lab Units 23  1942   AST U/L 38*   ALT U/L 37*     Results from last 7 days   Lab Units 23  194   PLATELETS Thousands/uL 335     Results from last 7 days   Lab Units 23  2019 23  1626 23  0658 23  1125   POC GLUCOSE mg/dl 95 136 88 83 85     Results from last 7 days   Lab Units 23  1126   PROT/CREAT RATIO UR  0 09             Results from last 7 days   Lab Units 23  1943   URINE CULTURE  Culture too young- will reincubate       Brief review of pertinent history:  Past Medical History:   Diagnosis Date   • Abnormal Pap smear of cervix     2016 abn pap, colpo WNL, cryotherapy; 10/2022 HSIL pap   • Asthma    • Depression    • Preeclampsia 2016     Past Surgical History:   Procedure Laterality Date   • GYNECOLOGIC CRYOSURGERY  2016     OB History    Para Term  AB Living   2 1 1 0 0 1   SAB IAB Ectopic Multiple Live Births   0 0 0 0 1      # Outcome Date GA Lbr Tommy/2nd Weight Sex Delivery Anes PTL Lv   2 Current            1 Term 16 39w4d 11:45 / 00:26 3220 g (7 lb 1 6 oz) F Vag-Spont EPI N DAVONTE      Complications: Gestational hypertension       Fetal data:  Nonstress test: date 22   Baseline: 150  Variability: moderate  Accelerations: present, 15x15; if multiples:   Decelerations: absent; if multiples:   Contractions: absent  Assessment: reactive; if multiples:   Plan: continue TID and PRN    MEDS:   Medication Administration - last 24 hours from 2023 0836 to 2023 3801       Date/Time Order Dose Route Action Action by     2023 1003 EST hydrOXYzine HCL (ATARAX) tablet 25 mg 25 mg Oral Given Debbie Day, RN     2023 1009 EST gabapentin (NEURONTIN) capsule 100 mg 100 mg Oral Given Debbie Day, RN     2023 1105 EST buprenorphine-naloxone (Suboxone) film 4 mg 4 mg Sublingual Given Debbie Day, RN     2023 0352 EST hydrOXYzine HCL (ATARAX) tablet 25 mg 25 mg Oral Given Yue Ale, RN     01/31/2023 2134 EST hydrOXYzine HCL (ATARAX) tablet 25 mg 25 mg Oral Given Gilliam, RN     01/31/2023 1524 EST hydrOXYzine HCL (ATARAX) tablet 25 mg 25 mg Oral Given Debbie Cornelia, RN     02/01/2023 7849 EST cyclobenzaprine (FLEXERIL) tablet 10 mg 10 mg Oral Given Ilya Rivera, RN     01/31/2023 2349 EST cyclobenzaprine (FLEXERIL) tablet 10 mg 10 mg Oral Given Cynthia Suraj, RN     01/31/2023 1524 EST cyclobenzaprine (FLEXERIL) tablet 10 mg 10 mg Oral Given Debbie Day, RN     01/31/2023 2134 EST melatonin tablet 3 mg 3 mg Oral Given Lilli, RN     02/01/2023 0012 EST buprenorphine-naloxone (Suboxone) film 4 mg 4 mg Sublingual Given Cynthia Ruelas, WARD        Current Facility-Administered Medications   Medication Dose Route Frequency   • buprenorphine-naloxone (Suboxone) film 8 mg  8 mg Sublingual Daily   • cyclobenzaprine (FLEXERIL) tablet 10 mg  10 mg Oral Q8H   • hepatitis A vaccine (HAVRIX) IM injection 1 mL  1 mL Intramuscular Once   • hepatitis B vac (recombinant) IM injection 20 mcg  1 mL Intramuscular Once   • hydrOXYzine HCL (ATARAX) tablet 25 mg  25 mg Oral Q6H   • loperamide (IMODIUM) capsule 2 mg  2 mg Oral Q4H PRN   • melatonin tablet 3 mg  3 mg Oral HS   • ondansetron (ZOFRAN-ODT) dispersible tablet 4 mg  4 mg Oral Q6H PRN            Lisa Lainez MD    2/1/2023  8:36 AM          -------------------------------    The floor/unit time was 25 minutes  The majority of time (>50%) was spent counseling and/or coordinating care with the patient and/or family members  At the conclusion of today's encounter, all questions were answered to her satisfaction  Thank you very much for this kind referral and please do not hesitate to contact me with any further questions or concerns      Lisa Lainez MD  Attending Physician, [place]

## 2023-02-01 NOTE — PROGRESS NOTES
Vitals:    01/31/23 2351   BP: 132/76   Pulse: 75   Resp:    Temp: 97 6 °F (36 4 °C)   SpO2:      Patient seen and examined at bedside with nursing staff  Reported COWS score 7  Patient feeling anxious, restless, and curled up in bed  Ramona reports prior suboxone therapy reaching maximum dose of 16mg  Per conversation with Dr Lita Saenz this evening, may increase suboxone dose given persistent symptoms  Suboxone 4mg film ordered, total 8mg in 24h  Continue to monitor COWS score q4h  Morning dose increased to 8mg  May reach max dose of 16mg in next 24h  Plan to reassess in the am      John Baxter 12 PGY-2

## 2023-02-01 NOTE — PROGRESS NOTES
Progress Note  Winford Cooks 32 y o  female MRN: [id number]  Unit/Bed#: L&D 327-01 Encounter: [id number]    Assessment:  32 y o  Julius Galeana at 29w0d admitted for for MAT initiation with suboxone therapy in the setting of OUD, last use on 1/29/23   Hospital day 3:    Plan:  * Substance abuse disorder affecting pregnancy in third trimester, antepartum  Assessment & Plan  · Fentanyl and heroin are drugs of choice, last use at 1600 on 1/29; 5-6 bags of fentanyl  · Currently lives in a recovery home with actively using tenants, has been using again for about 1 month  · CM consulted to help with housing and outpatient prescription of suboxone  · Initial rapid UDS negative, fentanyl pending  · Injection sites visibile on R lateral neck and L dorsum of hand  · MAT agreement signed with Dr Howard Leahy, monitoring COWS scores score q2-4h    Suboxone doses:  · 1/31: 4mg (1100)  · 2/1: 4mg (0012), 4mg (nina 0900)  · Current symptoms/PRN med:  · Restless legs/anxiety - atarax 25mg q6, flexeril 10mg q8  · Nausea - zofran PO q6  · Insomnia - melatonin 3mg qhs    Rubella non-immune status, antepartum  Assessment & Plan  Plan for MMR postpartum    Hepatitis C antibody test positive  Assessment & Plan  · Follow up Hep C RNA quant  · Hep A and B negative for acute infection  · Hep A antibody non-reactive   · Hep A non-immune  · Dose 1 of 2 ordered  · Hep B  · Dose 1 of 3 ordered    Foreign body of skin of neck  Assessment & Plan  Needle tip in superficial tissue of R lateral neck  US showed 7mm needle tip 1mm from IJ vein, gen surg evaluated and do not recommend surgical intervantion    Pregnancy affected by fetal growth restriction  Assessment & Plan  CMV pending  Toxo negative    28 weeks gestation of pregnancy  Assessment & Plan  NST TID  Denies ctx, loss of fluid, vaginal bleeding, and reports good fetal movement  Prenatal labs ordered  Fasting and 2hr postprandial glucose checks in place of GTT  Rhogam and tdap given 1/30      Subjective/Objective   Subjective:     Contractions: no  Loss of fluid: no  Vaginal bleeding: no  Fetal movement: no    Currently reports feeling much better since receiving her second dose of suboxone No other bothersome symptoms currently  No OB complaints  Objective:     Vitals:   Temp:  [97 6 °F (36 4 °C)-98 2 °F (36 8 °C)] 97 6 °F (36 4 °C)  HR:  [] 73  Resp:  [18-20] 18  BP: (128-137)/(70-79) 137/78       Physical Exam  Vitals reviewed  Constitutional:       General: She is not in acute distress  Appearance: She is normal weight  HENT:      Mouth/Throat:      Mouth: Mucous membranes are moist       Pharynx: Oropharynx is clear  Neck:      Comments: Injection site with erythema/induration on neck above right clavicle, palpable cords  Cardiovascular:      Rate and Rhythm: Normal rate  Pulses: Normal pulses  Pulmonary:      Effort: Pulmonary effort is normal  No respiratory distress  Abdominal:      Palpations: Abdomen is soft  Comments: gravid   Skin:     General: Skin is warm and dry  Neurological:      General: No focal deficit present  Mental Status: She is alert and oriented to person, place, and time  Mental status is at baseline  Fetal Assessment:  FHT: 150 / Moderate 6 - 25 bpm / 15x15, reactive  [place]: none    Lab, Imaging and other studies: I have personally reviewed pertinent reports        Lab Results   Component Value Date    WBC 15 97 (H) 01/29/2023    HGB 11 0 (L) 01/29/2023    HCT 34 4 (L) 01/29/2023    MCV 92 01/29/2023     01/29/2023       Lab Results   Component Value Date    CALCIUM 9 0 01/29/2023    K 4 1 01/29/2023    CO2 23 01/29/2023     01/29/2023    BUN 14 01/29/2023    CREATININE 0 69 01/29/2023       Lab Results   Component Value Date    ALT 37 (H) 01/29/2023    AST 38 (H) 01/29/2023    ALKPHOS 88 01/29/2023       Khoa Millan MD  OBGYN, PGY-2  2/1/2023  7:06 AM

## 2023-02-02 DIAGNOSIS — O24.419 GESTATIONAL DIABETES MELLITUS (GDM) IN SECOND TRIMESTER, GESTATIONAL DIABETES METHOD OF CONTROL UNSPECIFIED: Primary | ICD-10-CM

## 2023-02-02 PROBLEM — Z3A.29 29 WEEKS GESTATION OF PREGNANCY: Status: ACTIVE | Noted: 2023-01-03

## 2023-02-02 LAB
DOCUMENTATION: ABNORMAL
FENTANYL UR CFM-MCNC: ABNORMAL PG/ML
FENTANYL UR QL CFM: POSITIVE
FENTANYL+NORFENTANYL PNL UR: NORMAL PG/ML
FENTANYL+NORFENTANYL UR QL CFM: POSITIVE
GLUCOSE 1H P 50 G GLC PO SERPL-MCNC: 207 MG/DL (ref 40–134)
GLUCOSE SERPL-MCNC: 110 MG/DL (ref 65–140)
NORFENTANYL UR CFM-MCNC: ABNORMAL PG/ML
NORFENTANYL UR QL CFM: POSITIVE

## 2023-02-02 RX ORDER — BUPRENORPHINE AND NALOXONE 8; 2 MG/1; MG/1
8 FILM, SOLUBLE BUCCAL; SUBLINGUAL DAILY
Qty: 10 FILM | Refills: 0 | Status: CANCELLED | OUTPATIENT
Start: 2023-02-03 | End: 2023-02-13

## 2023-02-02 RX ADMIN — CYCLOBENZAPRINE HYDROCHLORIDE 10 MG: 10 TABLET, FILM COATED ORAL at 23:20

## 2023-02-02 RX ADMIN — CYCLOBENZAPRINE HYDROCHLORIDE 10 MG: 10 TABLET, FILM COATED ORAL at 07:26

## 2023-02-02 RX ADMIN — Medication 3 MG: at 23:20

## 2023-02-02 RX ADMIN — HYDROXYZINE HYDROCHLORIDE 25 MG: 25 TABLET ORAL at 15:45

## 2023-02-02 RX ADMIN — BUPRENORPHINE AND NALOXONE 8 MG: 8; 2 FILM BUCCAL; SUBLINGUAL at 09:04

## 2023-02-02 RX ADMIN — HYDROXYZINE HYDROCHLORIDE 25 MG: 25 TABLET ORAL at 23:20

## 2023-02-02 RX ADMIN — CYCLOBENZAPRINE HYDROCHLORIDE 10 MG: 10 TABLET, FILM COATED ORAL at 15:44

## 2023-02-02 NOTE — CASE MANAGEMENT
Case Management Progress Note    Patient name Steve   Location L&D 327/L&D 310-92 MRN [id number]  : 1995 Date 2023       LOS (days): 4  Geometric Mean LOS (GMLOS) (days):   Days to GMLOS:        OBJECTIVE:        Current admission status: Inpatient  Preferred Pharmacy:   [address] [place] [address] #[id number] VINCENT Villanueva [email or web address] [facility]  Phone: [phone] Fax: [phone]    Primary Care Provider: DESMOND Eden    Primary Insurance: [employer] Hassan  Secondary Insurance:     PROGRESS NOTE:    CM informed that OBGYN is considering discharge today for patient if possible - CM received call from Vermillion at [place] [address] stating she has not yet heard from the [facility] program, but is expecting a call back from them today regarding an initial appointment  Vermillion stated she will inquire if [facility] will start providing Suboxone at that first appointment, or if a follow up appointment is needed afterwards  MFM to bridge Suboxone until [facility] can start prescribing

## 2023-02-02 NOTE — CASE MANAGEMENT
Case Management Progress Note    Patient name Sreekanth Newell  Location L&D 327/L&D 838-86 MRN [id number]  : 1995 Date 2023       LOS (days): 4  Geometric Mean LOS (GMLOS) (days):   Days to GMLOS:        OBJECTIVE:        Current admission status: Inpatient  Preferred Pharmacy:   [address] [place] [address] #[id number] VINCENT Connolly [address] [employer] [facility]  Phone: [phone] Fax: [phone]    Primary Care Provider: DESMOND Blood    Primary Insurance: [email or web address]  Secondary Insurance:     PROGRESS NOTE:    ISAÍAS received a voicemail from Vermillion at [place] [phone] who stated that patient has been set up for an appointment through [facility], who already put the appointment information on patient's AVS (NEW PATIENT with Nighat R-  10:00 AM Harper Jarrett by 9:45 AM) [facility] Michelle Lockhart [address] [place] [address], [address], [place], [place], [address], [phone])    Vermillion stated that ARIADNA did not indicate if they will be starting to provide Suboxone at this appointment or if this is only an intake appointment and patient will require another appointment before they can start prescribing Suboxone for her  Vermillion stated she called ARIADNA asking for this information and she will call ISAÍAS back when she has the answer  ISAÍAS updated OBGYN

## 2023-02-02 NOTE — CERTIFIED RECOVERY SPECIALIST
Certified  Note    Patient name: Basilio Gallagher  Location: L&D 327/L&D 580-17  [place]: [address] [place] [address]  Attending:  Marium Hennessy MD MRN [id number]  : 1995  Age: 32 y o  Sex: female Date 2023         Substance Use History:     Social History     Substance and Sexual Activity   Alcohol Use Not Currently    Comment: last used 2021        Social History     Substance and Sexual Activity   Drug Use Not Currently   • Types: Heroin, Methamphetamines, Fentanyl    Comment: last used 2023     Communication with MFM regarding patient and outpatient services  Met via teams to discuss recovery support and other services in community    CRS to meet with patient on 2/3 in the     Kristian

## 2023-02-02 NOTE — CASE MANAGEMENT
Case Management Progress Note    Patient name [facility]  Location L&D 327/L&D 163-08 MRN [id number]  : 1995 Date 2023       LOS (days): 4  Geometric Mean LOS (GMLOS) (days):   Days to GMLOS:        OBJECTIVE:        Current admission status: Inpatient  Preferred Pharmacy:   [address] [place] [address] #[id number] Darryl Snellen, [place] - [address] [facility]  Phone: [phone] Fax: [phone]    Primary Care Provider: DESMOND Rodriguez    Primary Insurance: [facility]  Secondary Insurance:     PROGRESS NOTE:    CM called HOST again ([phone]) to inquire if they had heard back from the [facility] program with patient's initial appointment date/time  No answer, left VM requesting a return call

## 2023-02-02 NOTE — PLAN OF CARE
Problem: PAIN - ADULT  Goal: Verbalizes/displays adequate comfort level or baseline comfort level  Description: Interventions:  - Encourage patient to monitor pain and request assistance  - Assess pain using appropriate pain scale  - Administer analgesics based on type and severity of pain and evaluate response  - Implement non-pharmacological measures as appropriate and evaluate response  - Consider cultural and social influences on pain and pain management  - Notify physician/advanced practitioner if interventions unsuccessful or patient reports new pain  Outcome: Progressing     Problem: INFECTION - ADULT  Goal: Absence or prevention of progression during hospitalization  Description: INTERVENTIONS:  - Assess and monitor for signs and symptoms of infection  - Monitor lab/diagnostic results  - Monitor all insertion sites, i e  indwelling lines, tubes, and drains  - Monitor endotracheal if appropriate and nasal secretions for changes in amount and color  - [place] appropriate cooling/warming therapies per order  - Administer medications as ordered  - Instruct and encourage patient and family to use good hand hygiene technique  - Identify and instruct in appropriate isolation precautions for identified infection/condition  Outcome: Progressing  Goal: Absence of fever/infection during neutropenic period  Description: INTERVENTIONS:  - Monitor WBC    Outcome: Progressing     Problem: SAFETY ADULT  Goal: Patient will remain free of falls  Description: INTERVENTIONS:  - Educate patient/family on patient safety including physical limitations  - Instruct patient to call for assistance with activity   - Consult OT/PT to assist with strengthening/mobility   - Keep Call bell within reach  - Keep bed low and locked with side rails adjusted as appropriate  - Keep care items and personal belongings within reach  - Initiate and maintain comfort rounds  - Make Fall Risk Sign visible to staff  - Offer Toileting every  Hours, in advance of need  - Initiate/Maintain alarm  - Obtain necessary fall risk management equipment:   - Apply yellow socks and bracelet for high fall risk patients  - Consider moving patient to room near nurses station  Outcome: Progressing  Goal: Maintain or return to baseline ADL function  Description: INTERVENTIONS:  -  Assess patient's ability to carry out ADLs; assess patient's baseline for ADL function and identify physical deficits which impact ability to perform ADLs (bathing, care of mouth/teeth, toileting, grooming, dressing, etc )  - Assess/evaluate cause of self-care deficits   - Assess range of motion  - Assess patient's mobility; develop plan if impaired  - Assess patient's need for assistive devices and provide as appropriate  - Encourage maximum independence but intervene and supervise when necessary  - Involve family in performance of ADLs  - Assess for home care needs following discharge   - Consider OT consult to assist with ADL evaluation and planning for discharge  - Provide patient education as appropriate  Outcome: Progressing  Goal: Maintains/Returns to pre admission functional level  Description: INTERVENTIONS:  - Perform BMAT or MOVE assessment daily    - Set and communicate daily mobility goal to care team and patient/family/caregiver  - Collaborate with rehabilitation services on mobility goals if consulted  - Perform Range of Motion  times a day  - Reposition patient every  hours    - Dangle patient  times a day  - Stand patient  times a day  - Ambulate patient  times a day  - Out of bed to chair  times a day   - Out of bed for meals times a day  - Out of bed for toileting  - Record patient progress and toleration of activity level   Outcome: Progressing     Problem: DISCHARGE PLANNING  Goal: Discharge to home or other facility with appropriate resources  Description: INTERVENTIONS:  - Identify barriers to discharge w/patient and caregiver  - Arrange for needed discharge resources and transportation as appropriate  - Identify discharge learning needs (meds, wound care, etc )  - Arrange for interpretive services to assist at discharge as needed  - Refer to Case Management Department for coordinating discharge planning if the patient needs post-hospital services based on physician/advanced practitioner order or complex needs related to functional status, cognitive ability, or social support system  Outcome: Progressing     Problem: Knowledge Deficit  Goal: Patient/family/caregiver demonstrates understanding of disease process, treatment plan, medications, and discharge instructions  Description: Complete learning assessment and assess knowledge base    Interventions:  - Provide teaching at level of understanding  - Provide teaching via preferred learning methods  Outcome: Progressing     Problem: ANTEPARTUM  Goal: Maintain pregnancy as long as maternal and/or fetal condition is stable  Description: INTERVENTIONS:  - Maternal surveillance  - Fetal surveillance  - Monitor uterine activity  - Medications as ordered  - Bedrest  Outcome: Progressing

## 2023-02-02 NOTE — CONSULTS
Consult Service: Gastroenterology      PATIENT INFORMATION      Saba Jordan 32 y o  female MRN: [id number]  Unit/Bed#: L&D 327-01 Encounter: [id number]  PCP: DESMOND Beach  Date of Admission:  1/29/2023  Date of Consultation: 02/02/23  Requesting Physician: Marium Hennessy MD       [id number] [facility] Arlette Sahu is a 32 y o  old female with PMH of depression, asthma, opioid use disorder (IV fentanyl 2 bundles/day), pregnancy presenting with request for opioid detox, with GI consulted for HCV  HCV  - HCV Ab +, Viral load 194,000, normal LFTs  - No indication for treatment during pregnancy; will arrange for outpt hepatology f/u  - Counseled pt on risk reduction of vertical transmission with avoidance of IVDU    GI to sign off at this time, please call us back if further questions or concerns  REASON FOR CONSULTATION      HCV    HISTORY OF PRESENT ILLNESS      Saba Jordan is a 32 y o  female with PMH of depression, asthma, opioid use disorder (IV fentanyl 2 bundles/day), pregnancy presenting with request for opioid detox, with GI consulted for HCV  Hospital course also notable for retained needle in R neck foreign body  Surgery consulted, recommended conservative management due to high risk of vascular injury with surgical intervention  On broad work up, noted to have positive HCV Ab  HCV viral load 194,000  Hep B serologic markers negative, HAV antibodies negative  Liver chemistries from 1/29/23 normal (AST, ALT, alk phos, T Bili)  Prior to this hospitalization, pt states she has no known liver disease  Currently endorsing no abdominal pain, no jaundice, no fatigue, no change in bowel habits  REVIEW OF SYSTEMS     A thorough 12-point review of systems has been conducted  Pertinent positives and negatives are mentioned in the history of present illness         PAST MEDICAL & SURGICAL HISTORY      Past Medical History:   Diagnosis Date   • Abnormal Pap smear of cervix     2016 abn pap, colpo WNL, cryotherapy; 10/2022 HSIL pap   • Asthma    • Depression    • Preeclampsia 2016       Past Surgical History:   Procedure Laterality Date   • GYNECOLOGIC CRYOSURGERY  2016         MEDICATIONS & ALLERGIES       Medications:   Prior to Admission medications    Medication Sig Start Date End Date Taking?  Authorizing Provider   aspirin (ECOTRIN LOW STRENGTH) 81 mg EC tablet Take 2 tablets (162 mg total) by mouth daily 10/11/22  Yes DESMOND Rodríguez   Prenatal Vit-Fe Fumarate-FA (PRENATAL VITAMINS PO) Take by mouth   Yes Historical Provider, MD       Allergies: No Known Allergies      SOCIAL HISTORY      Marital Status: Single    Substance Use History:   Social History     Substance and Sexual Activity   Alcohol Use Not Currently    Comment: last used 2021     Social History     Tobacco Use   Smoking Status Some Days   • Types: Cigarettes   • Last attempt to quit: 2022   • Years since quittin 7   Smokeless Tobacco Never     Social History     Substance and Sexual Activity   Drug Use Not Currently   • Types: Heroin, Methamphetamines, Fentanyl    Comment: last used 2023         FAMILY HISTORY      Non-Contributory      PHYSICAL EXAM     Vitals:   Blood Pressure: 124/79 (23)  Pulse: 90 (23)  Temperature: 98 2 °F (36 8 °C) (23)  Temp Source: Oral (23)  Respirations: 18 (23)  SpO2: 100 % (23 0230)    Physical Exam:   GENERAL: NAD  HEENT:  NC/AT, no scleral icterus  CARDIAC:  RRR, +S1/S2  PULMONARY:  CTA B/L, no wheezing/rales/rhonci, non-labored breathing  ABDOMEN:  Soft, NT/ND, +BS, no rebound/guarding/rigidity  EXTREMITIES:  No edema, cyanosis, or clubbing  NEUROLOGIC:  Alert  SKIN:  No rashes or erythema    ADDITIONAL DATA     Lab Results:       Lab Units 23  1942 10/21/21  1144 21  1318   SODIUM mmol/L 135 140 139   POTASSIUM mmol/L 4 1 3 8 3 8   CHLORIDE mmol/L 106 102 99   CO2 mmol/L 23 27 30 BUN mg/dL 14 12 25   CREATININE mg/dL 0 69 0 62 0 93   GLUCOSE RANDOM mg/dL 108*  --  99   CALCIUM mg/dL 9 0 9 2 10 1            Lab Units 01/29/23  1942 10/21/21  1144   TOTAL PROTEIN g/dL 7 3 7 1   ALBUMIN g/dL 3 7 4 2   TOTAL BILIRUBIN mg/dL 0 68 0 60   AST U/L 38* 195*   ALT U/L 37* 336*   ALK PHOS U/L 88 50           Lab Units 01/29/23  1942 10/21/21  1144 07/28/21  1318   WBC Thousand/uL 15 97* 8 00 8 20   HEMOGLOBIN g/dL 11 0* 11 9* 11 9*   HEMATOCRIT % 34 4* 36 3* 36 1*   PLATELETS Thousands/uL 335 336 246   MCV fL 92 88 85       No results found for: IRON, TIBC, FERRITIN    No results found for: INR, PROTIME      Microbiology Results:  Results from last 7 days   Lab Units 01/29/23 1943   URINE CULTURE  10,000-19,000 cfu/ml Lactobacillus species*       Imaging:  No results found  Narrative/Impressions - 3 day look back     EKG, Pathology, and Other Studies Reviewed on Admission:   · EKG: Reviewed    Counseling / Coordination of Care Time: 30 total mins spent n consult  Greater than 50% of total time spent on patient counseling and coordination of care    ANDRZEJ Moore   PGY-4 Gastroenterology Fellow  [facility]  Available on [email or web address]  Recommendations not final until attending attestation

## 2023-02-02 NOTE — PROGRESS NOTES
Progress Note - Maternal Fetal Medicine   Justin Gallagher 32 y o  female MRN: [id number]  Unit/Bed#: L&D 327-01 Encounter: [id number]  Admit date: 1/29/2023  Today's date: 02/02/23    Assessment/Plan     Ms Diane Gallego is a 32 y o  Rudy Bourgeois at [facility] Day: 5, admitted for buprenorphine stabilization    By issue:    Rubella non-immune status, antepartum  Assessment & Plan  Plan for MMR postpartum    Hepatitis C antibody test positive  Assessment & Plan  · Hep C RNA quant 5 288  · Hep A and B negative for acute infection  · Hep A antibody non-reactive   · Hep A non-immune  · Dose 1 of 2 ordered (havrix) 2 doses required 6-12 months appart  · Hep B  · Dose 1 of 3 ordered, Pending second dose in 1 month and 3rd dose in 6 months     Foreign body of skin of neck  Assessment & Plan  Needle tip in superficial tissue of R lateral neck  US showed 7mm needle tip 1mm from IJ vein, gen surg evaluated and do not recommend surgical intervantion    Pregnancy affected by fetal growth restriction  Assessment & Plan  Found at 28 weeks with nml umb dopplers  CMV negative  Toxo negative    29 weeks gestation of pregnancy  Assessment & Plan  NST TID  Denies ctx, loss of fluid, vaginal bleeding, and reports good fetal movement  Prenatal labs ordered  Fasting and 2hr postprandial wnl, 1h GTT ordered today   Rhogam and tdap given 1/30    * Substance abuse disorder affecting pregnancy in third trimester, antepartum  Assessment & Plan  · Fentanyl and heroin are drugs of choice, last use at 1600 on 1/29; 5-6 bags of fentanyl  · Currently lives in a recovery home with actively using tenants, has been using again for about 1 month  · CM consulted to help with housing and outpatient prescription of suboxone  · Initial rapid UDS negative, fentanyl pending  · Injection sites visibile on R lateral neck and L dorsum of hand  · MAT agreement signed with Dr Raoul Griffith, monitoring COWS scores score q2-4h    Suboxone doses:  · 1/31: 4mg (1100)  · 2/1: 4mg (0012), 4mg (nina 0900)  · No new PRN doses required  · Current symptoms/PRN med:  · Restless legs/anxiety - atarax 25mg q6, flexeril 10mg q8  · Nausea - zofran PO q6  · Insomnia - melatonin 3mg qhs    · Completed  [facility] intake yesterday  Plan is for patient to have intake for OP D&A rehab/counseling through [facility], has a virtual appointment on Friday Feb 10th at [address] [place] [address]  Patient is agreeable to getting her Suboxone through the [facility] office in [facility], [facility] sent a referral to them to schedule her with a provider  CM following              Subjective    Contractions: no  Loss of fluid: no  Vaginal bleeding: no  Fetal movement: yes    Pain: no  Headaches: no  Visual changes: no  Chest pain: no  Shortness of breath: no  Nausea: no  Vomiting/Diarrhea: no  Dysuria: no  Leg pain: no          Objective      Patient Vitals for the past 24 hrs:   BP Temp Temp src Pulse Resp SpO2   02/02/23 0230 -- -- -- 96 -- 100 %   02/01/23 2234 119/82 97 5 °F (36 4 °C) Oral 80 18 97 %   02/01/23 2233 -- -- -- 86 -- --   02/01/23 1753 -- -- -- 89 -- 97 %   02/01/23 1541 114/73 (!) 97 4 °F (36 3 °C) Oral 83 -- --   02/01/23 1530 -- -- -- 83 -- --   02/01/23 1335 -- -- -- 91 -- 96 %   02/01/23 0925 -- -- -- 85 -- --   02/01/23 0731 118/81 97 7 °F (36 5 °C) Oral 100 -- --       Physical Exam  Constitutional:       Appearance: She is well-developed  HENT:      Head: Normocephalic and atraumatic  Eyes:      Conjunctiva/sclera: Conjunctivae normal       Pupils: Pupils are equal, round, and reactive to light  Cardiovascular:      Rate and Rhythm: Normal rate and regular rhythm  Heart sounds: Normal heart sounds  Pulmonary:      Effort: Pulmonary effort is normal       Breath sounds: Normal breath sounds  Abdominal:      General: Bowel sounds are normal       Palpations: Abdomen is soft  Genitourinary:     Vagina: Normal    Musculoskeletal:         General: Normal range of motion        Cervical back: Normal range of motion and neck supple  Skin:     General: Skin is warm  Neurological:      Mental Status: She is alert and oriented to person, place, and time  I/O     None          Invasive Devices     Peripheral Intravenous Line  Duration           Peripheral IV 23 Dorsal (posterior); Right Hand 3 days                Results from last 7 days   Lab Units 23   WBC Thousand/uL 15 97*   NEUTROS ABS Thousands/µL 12 99*   HEMOGLOBIN g/dL 11 0*   MCV fL 92   PLATELETS Thousands/uL 335     Results from last 7 days   Lab Units 23  194   POTASSIUM mmol/L 4 1   CHLORIDE mmol/L 106   CO2 mmol/L 23   BUN mg/dL 14   CREATININE mg/dL 0 69   EGFR ml/min/1 73sq m 118     Results from last 7 days   Lab Units 23  194   AST U/L 38*   ALT U/L 37*     Results from last 7 days   Lab Units 23  194   PLATELETS Thousands/uL 335     Results from last 7 days   Lab Units 23  1752 23  0928 23  2019 23  1626 23  0658 23  2031 23  1125   POC GLUCOSE mg/dl 110 96 95 136 88 83 85     Results from last 7 days   Lab Units 23  1126   PROT/CREAT RATIO UR  0 09             Results from last 7 days   Lab Units 23   URINE CULTURE  10,000-19,000 cfu/ml Lactobacillus species*       Brief review of pertinent history:  Past Medical History:   Diagnosis Date   • Abnormal Pap smear of cervix     2016 abn pap, colpo WNL, cryotherapy; 10/2022 HSIL pap   • Asthma    • Depression    • Preeclampsia 2016     Past Surgical History:   Procedure Laterality Date   • GYNECOLOGIC CRYOSURGERY  2016     OB History    Para Term  AB Living   2 1 1 0 0 1   SAB IAB Ectopic Multiple Live Births   0 0 0 0 1      # Outcome Date GA Lbr Tommy/2nd Weight Sex Delivery Anes PTL Lv   2 Current            1 Term 16 39w4d 11:45 / 00:26 3220 g (7 lb 1 6 oz) F Vag-Spont EPI N DAVONTE      Complications: Gestational hypertension       Fetal data:  Nonstress test: date 23 Time: now   Baseline: 130  Variability: moderate  Accelerations: present, 15x15  Decelerations: absent  Contractions: present , no regular   Assessment: reactive  Plan: continue TID and PRN      MEDS:   Medication Administration - last 24 hours from 2023 0702 to 2023 5990       Date/Time Order Dose Route Action Action by     2023 2230 EST hydrOXYzine HCL (ATARAX) tablet 25 mg 25 mg Oral Given Irving Aguilera, RN     2023 1625 EST hydrOXYzine HCL (ATARAX) tablet 25 mg 25 mg Oral Given Wilmar Seymour, RN     2023 1029 EST hydrOXYzine HCL (ATARAX) tablet 25 mg 25 mg Oral Given Wilmar Seymour, RN     2023 2230 EST cyclobenzaprine (FLEXERIL) tablet 10 mg 10 mg Oral Given Irving Aguilera, RN     2023 1541 EST cyclobenzaprine (FLEXERIL) tablet 10 mg 10 mg Oral Given Iwlmar , RN     2023 7136 EST cyclobenzaprine (FLEXERIL) tablet 10 mg 10 mg Oral Given Wilmar Seymour, RN     2023 2230 EST melatonin tablet 3 mg 3 mg Oral Given Irving Aguilera, RN     2023 5889 EST buprenorphine-naloxone (Suboxone) film 8 mg 8 mg Sublingual Given Wilmar Seymour, RN     2023 1029 EST hepatitis A vaccine (HAVRIX) IM injection 1 mL 1 mL Intramuscular Given [facility], RN     2023 4592 EST hepatitis B vac (recombinant) IM injection 20 mcg 20 mcg Intramuscular Given [facility], RN        Current Facility-Administered Medications   Medication Dose Route Frequency   • buprenorphine-naloxone (Suboxone) film 8 mg  8 mg Sublingual Daily   • cyclobenzaprine (FLEXERIL) tablet 10 mg  10 mg Oral Q8H   • hydrOXYzine HCL (ATARAX) tablet 25 mg  25 mg Oral Q6H   • loperamide (IMODIUM) capsule 2 mg  2 mg Oral Q4H PRN   • melatonin tablet 3 mg  3 mg Oral HS   • ondansetron (ZOFRAN-ODT) dispersible tablet 4 mg  4 mg Oral Q6H PRN            Dimitri Pierre MD  OBGYN, PGY-4  2023  7:02 AM

## 2023-02-03 ENCOUNTER — TELEPHONE (OUTPATIENT)
Dept: PERINATAL CARE | Facility: OTHER | Age: 28
End: 2023-02-03

## 2023-02-03 VITALS
TEMPERATURE: 98 F | DIASTOLIC BLOOD PRESSURE: 72 MMHG | OXYGEN SATURATION: 95 % | SYSTOLIC BLOOD PRESSURE: 116 MMHG | HEART RATE: 86 BPM | RESPIRATION RATE: 16 BRPM

## 2023-02-03 PROBLEM — O24.419 GESTATIONAL DIABETES: Status: ACTIVE | Noted: 2023-02-03

## 2023-02-03 LAB
GLUCOSE SERPL-MCNC: 86 MG/DL (ref 65–140)
GLUCOSE SERPL-MCNC: 95 MG/DL (ref 65–140)

## 2023-02-03 RX ORDER — NALOXONE HYDROCHLORIDE 4 MG/.1ML
SPRAY NASAL
Qty: 1 EACH | Refills: 0 | Status: SHIPPED | OUTPATIENT
Start: 2023-02-03

## 2023-02-03 RX ORDER — BUPRENORPHINE AND NALOXONE 8; 2 MG/1; MG/1
8 FILM, SOLUBLE BUCCAL; SUBLINGUAL DAILY
Qty: 7 FILM | Refills: 0 | Status: SHIPPED | OUTPATIENT
Start: 2023-02-03 | End: 2023-02-10

## 2023-02-03 RX ADMIN — HYDROXYZINE HYDROCHLORIDE 25 MG: 25 TABLET ORAL at 10:53

## 2023-02-03 RX ADMIN — CYCLOBENZAPRINE HYDROCHLORIDE 10 MG: 10 TABLET, FILM COATED ORAL at 15:10

## 2023-02-03 RX ADMIN — BUPRENORPHINE AND NALOXONE 8 MG: 8; 2 FILM BUCCAL; SUBLINGUAL at 08:30

## 2023-02-03 RX ADMIN — CYCLOBENZAPRINE HYDROCHLORIDE 10 MG: 10 TABLET, FILM COATED ORAL at 08:30

## 2023-02-03 NOTE — CASE MANAGEMENT
Case Management Discharge Planning Note    Patient name Elizabeth Hurd  Location L&D 327/L&D 652-48 MRN [id number]  : 1995 Date 2/3/2023       Current Admission Date: 2023  Current Admission Diagnosis:Substance abuse disorder affecting pregnancy in third trimester, antepartum   Patient Active Problem List    Diagnosis Date Noted   • Gestational diabetes 2023   • 27 weeks gestation of pregnancy    • Substance abuse disorder affecting pregnancy in third trimester, antepartum 2023   • Foreign body of skin of neck 2023   • Hepatitis C antibody test positive 2023   • Rubella non-immune status, antepartum 2023   • Pregnancy affected by fetal growth restriction 2023   • 29 weeks gestation of pregnancy 2023   • Birth control counseling 2022   • Abnormal Pap smear of cervix 10/17/2022   • History of gestational hypertension 10/11/2022   • Obesity affecting pregnancy 10/11/2022      LOS (days): 5  Geometric Mean LOS (GMLOS) (days):   Days to GMLOS:     OBJECTIVE:  Risk of Unplanned Readmission Score: 9 71         Current admission status: Inpatient   Preferred Pharmacy:   [address] [place] [address] #[id number] VINCENT Araujo - [address] [facility]  [address]  Phone: [phone] Fax: [phone]    Primary Care Provider: DESMOND Persaud    Primary Insurance: [employer]  Secondary Insurance:     DISCHARGE DETAILS:    OBGYN confirmed with Dr Edis Vincent with perinatology that he can prescribe patient's Suboxone until her appointment on  with [facility] toxicology provider Kathrine Ball DO  Information for all of patient's upcoming appointments listed on her AVS/DCI  CM also informed patient will need a Lyft when ready to discharge  As CM may not be here when patient is ready to go, CM spoke with RN who offered to assist with arranging transport   CM provided RN with Lyft waiver form for patient to sign and directions on contacting [facility] for a Lyft

## 2023-02-03 NOTE — DISCHARGE SUMMARY
[facility]  Discharge- [facility] Dears Elliott 1995, 32 y o  female MRN: [id number]  Unit/Bed#: L&D 327-01 Encounter: [id number]  Primary Care Provider: DESMOND Coon   Date and time admitted to hospital: 1/29/2023  9:17 PM    Gestational diabetes  Assessment & Plan  No results found for: HGBA1C    Recent Labs     01/31/23  2019 02/01/23  0928 02/01/23  1752 02/02/23  2122   POCGLU 95 96 110 110       Blood Sugar Average: Last 72 hrs:  (P) 842 [id number]     1h   Will follow outpatient with diabetes educator, referral order placed   On diabetic diet     Rubella non-immune status, antepartum  Assessment & Plan  Plan for MMR postpartum    Hepatitis C antibody test positive  Assessment & Plan  · Hep C RNA quant 5 288  · Hep A and B negative for acute infection  · Hep A antibody non-reactive   · Hep A non-immune  · Dose 1 of 2 ordered (havrix) 2 doses required 6-12 months appart  · Hep B  · Dose 1 of 3 ordered, Pending second dose in 1 month and 3rd dose in 6 months     Foreign body of skin of neck  Assessment & Plan  Needle tip in superficial tissue of R lateral neck  US showed 7mm needle tip 1mm from IJ vein, gen surg evaluated and do not recommend surgical intervantion    Pregnancy affected by fetal growth restriction  Assessment & Plan  Found at 28 weeks with nml umb dopplers  CMV negative  Toxo negative    29 weeks gestation of pregnancy  Assessment & Plan  NST TID  Denies ctx, loss of fluid, vaginal bleeding, and reports good fetal movement  Prenatal labs ordered  Rhogam and tdap given 1/30    * Substance abuse disorder affecting pregnancy in third trimester, antepartum  Assessment & Plan  · Fentanyl and heroin are drugs of choice, last use at 1600 on 1/29; 5-6 bags of fentanyl  · Currently lives in a recovery home with actively using tenants, has been using again for about 1 month  · CM consulted to help with housing and outpatient prescription of suboxone  · Initial rapid UDS negative, fentanyl pending  · Injection sites visibile on R lateral neck and L dorsum of hand  · MAT agreement signed with Dr Evita Lopes, monitoring COWS scores score q2-4h    Suboxone doses:  · 1/31: 4mg (1100)  · 2/1: 4mg (0012), 4mg (nina 0900)  · No new PRN doses required  · Current symptoms/PRN med:  · Restless legs/anxiety - atarax 25mg q6, flexeril 10mg q8  · Nausea - zofran PO q6  · Insomnia - melatonin 3mg qhs    · Completed  [facility] intake yesterday  Plan is for patient to have intake for OP D&A rehab/counseling through [facility], has a virtual appointment on Friday Feb 10th at [address] [place] [address]  Patient is agreeable to getting her Suboxone through the [facility] office in [address] [place] [address] for february 16/23, nevertheless unsure if share will start to prescribe her Suboxone in that date  Awaiting further information in order to define how many days of Suboxone she need at time of discharge  CM following           Admission Date: 1/29/2023     Discharge Date: 02/03/23    Attending: Dea Dowell MD    Principal Diagnosis: At risk for withdrawal [Z91 89]    Secondary Diagnosis:   Patient Active Problem List   Diagnosis   • History of gestational hypertension   • Obesity affecting pregnancy   • Abnormal Pap smear of cervix   • Birth control counseling   • 29 weeks gestation of pregnancy   • Pregnancy affected by fetal growth restriction   • Substance abuse disorder affecting pregnancy in third trimester, antepartum   • Foreign body of skin of neck   • Hepatitis C antibody test positive   • Rubella non-immune status, antepartum   • 27 weeks gestation of pregnancy   • Gestational diabetes       Procedures: none    Hospital course: Patient presented last 1/29/23 with desire for buprenorphine stabilization  Patient states recent fentanyl abuse with 5-6 bags per day, patient presented without withdrawal symptoms  Initial UDS positive for fentanyl     Patient was started on buprenorphine/naltrexone (Suboxone) and she reached a dose of 8 mg daily with adequate response  Patient was under observation for 2 more days without evidence of further withdrawal symptoms, COWs score at discharge of 0-1  Dr Paula Hwang describes her Suboxone until March 9 when patient have her first appointment with the [facility] program, for further preserving prescription   In addition rapid HIV, hepatitis B and C, chlamydia, gonorrhea toxoplasma and cytomegalovirus were collected with negative results, patient was found to be no immune for hepatitis a and hepatitis B  she received first dose of hepatitis A and hepatitis B inpatient  We will continue serial as outpatient in the clinic  Patient is rubella nonimmune and will require MMR after delivery  During this admission 1 hour GTT was performed with result of 207  Patient with diagnosis of gestational diabetes, she was started on diabetic diet with good control of fingerstick's  Follow-up in the [facility] office for diabetes education  During this admission patient disclosed two needles in her lateral neck, soft tissue ultrasound with evidence of needle fragment measuring approximately 7 mm long within the subcutaneous/muscular tissues of the right neck with the tip approximately 1 mm from the internal jugular vein  General surgery team was consulted and they did not consider surgical management appropriate  Commendations to avoid pressure in her neck were discussed with patient  Positive hepatitis C quant, GI evaluated patient this admission and will follow-up outpatient after delivery  Patient will follow-up in [facility] for her prenatal care, with [facility] for her Suboxone scription until first appointment with [facility]   at [facility] aware of patient need for transportation  Continue close follow-up with patient  Narcan was prescribed in addition to her Suboxone before discharge            Lab Results:   Lab Results   Component Value Date    WBC 15 97 (H) 01/29/2023    HGB 11 0 (L) 01/29/2023    HCT 34 4 (L) 01/29/2023    MCV 92 01/29/2023     01/29/2023     Lab Results   Component Value Date    CALCIUM 9 0 01/29/2023    K 4 1 01/29/2023    CO2 23 01/29/2023     01/29/2023    BUN 14 01/29/2023    CREATININE 0 69 01/29/2023     Lab Results   Component Value Date/Time    POCGLU 95 02/03/2023 12:25 PM    POCGLU 86 02/03/2023 07:32 AM    POCGLU 110 02/02/2023 09:22 PM    POCGLU 110 02/01/2023 05:52 PM    POCGLU 96 02/01/2023 09:28 AM     No results found for: PTT  No results found for: INR, PROTIME    Complications: none apparent    Condition at discharge: stable     Discharge instructions/Information to patient and family:   See After Visit Summary for information provided to patient and family  Provisions for Follow-Up Care:  See After Visit Summary for information related to follow-up care and any pertinent home health orders  Disposition: See After Visit Summary for discharge disposition information  Planned Readmission: No    Discharge Medications: For a complete list of the patient's medications, please refer to her med rec      King Imelda MD  2/3/2023  3:57 PM

## 2023-02-03 NOTE — PROGRESS NOTES
Progress Note - Maternal Fetal Medicine   Seamus Gallagher 32 y o  female MRN: [id number]  Unit/Bed#: L&D 327-01 Encounter: [id number]  Admit date: 1/29/2023  Today's date: 02/03/23    Assessment/Plan     Ms Dia Kinney is a 32 y o  Blanche Eaves at [facility]: 6, admitted for suboxone stabilization    By issue:    Gestational diabetes  Assessment & Plan  No results found for: HGBA1C    Recent Labs     01/31/23 2019 02/01/23  0928 02/01/23  1752 02/02/23 2122   POCGLU 95 96 110 110       Blood Sugar Average: Last 72 hrs:  (P) 261 [id number]     1h   Will follow outpatient with diabetes educator, referral order placed   On diabetic diet     Rubella non-immune status, antepartum  Assessment & Plan  Plan for MMR postpartum    Hepatitis C antibody test positive  Assessment & Plan  · Hep C RNA quant 5 288  · Hep A and B negative for acute infection  · Hep A antibody non-reactive   · Hep A non-immune  · Dose 1 of 2 ordered (havrix) 2 doses required 6-12 months appart  · Hep B  · Dose 1 of 3 ordered, Pending second dose in 1 month and 3rd dose in 6 months     Foreign body of skin of neck  Assessment & Plan  Needle tip in superficial tissue of R lateral neck  US showed 7mm needle tip 1mm from IJ vein, gen surg evaluated and do not recommend surgical intervantion    Pregnancy affected by fetal growth restriction  Assessment & Plan  Found at 28 weeks with nml umb dopplers  CMV negative  Toxo negative    29 weeks gestation of pregnancy  Assessment & Plan  NST TID  Denies ctx, loss of fluid, vaginal bleeding, and reports good fetal movement  Prenatal labs ordered  Rhogam and tdap given 1/30    * Substance abuse disorder affecting pregnancy in third trimester, antepartum  Assessment & Plan  · Fentanyl and heroin are drugs of choice, last use at 1600 on 1/29; 5-6 bags of fentanyl  · Currently lives in a recovery home with actively using tenants, has been using again for about 1 month  · CM consulted to help with housing and outpatient prescription of suboxone  · Initial rapid UDS negative, fentanyl pending  · Injection sites visibile on R lateral neck and L dorsum of hand  · MAT agreement signed with Dr Alisa Humphrey, monitoring COWS scores score q2-4h    Suboxone doses:  · 1/31: 4mg (1100)  · 2/1: 4mg (0012), 4mg (nina 0900)  · No new PRN doses required  · Current symptoms/PRN med:  · Restless legs/anxiety - atarax 25mg q6, flexeril 10mg q8  · Nausea - zofran PO q6  · Insomnia - melatonin 3mg qhs    · Completed  [facility] intake yesterday  Plan is for patient to have intake for OP D&A rehab/counseling through [email or web address], has a virtual appointment on Friday Feb 10th at [address] [place] [address]  Patient is agreeable to getting her Suboxone through the [facility] office in [address] [place] [address] for february 16/23, nevertheless unsure if share will start to prescribe her Suboxone in that date  Awaiting further information in order to define how many days of Suboxone she need at time of discharge  CM following              Subjective    Contractions: no  Loss of fluid: no  Vaginal bleeding: no  Fetal movement: yes    Pain: no  Headaches: no  Visual changes: no  Chest pain: no  Shortness of breath: no  Nausea: no  Vomiting/Diarrhea: no  Dysuria: no  Leg pain: no          Objective      Patient Vitals for the past 24 hrs:   BP Temp Temp src Pulse Resp SpO2   02/03/23 0410 -- -- -- 82 -- --   02/03/23 0008 111/70 97 8 °F (36 6 °C) Oral 78 18 --   02/02/23 2121 107/62 97 9 °F (36 6 °C) Oral 94 18 97 %   02/02/23 2000 -- -- -- 86 -- --   02/02/23 1539 -- -- -- 90 -- --   02/02/23 1403 121/82 97 8 °F (36 6 °C) Oral 88 16 --   02/02/23 1100 123/80 98 °F (36 7 °C) -- 88 16 --   02/02/23 0730 124/79 -- -- 90 -- --   02/02/23 0728 -- 98 2 °F (36 8 °C) Oral 90 18 --       Physical Exam  Constitutional:       Appearance: She is well-developed  HENT:      Head: Normocephalic and atraumatic     Eyes:      Conjunctiva/sclera: Conjunctivae normal       Pupils: Pupils are equal, round, and reactive to light  Cardiovascular:      Rate and Rhythm: Normal rate and regular rhythm  Heart sounds: Normal heart sounds  Pulmonary:      Effort: Pulmonary effort is normal       Breath sounds: Normal breath sounds  Abdominal:      General: Bowel sounds are normal       Palpations: Abdomen is soft  Genitourinary:     Vagina: Normal    Musculoskeletal:         General: Normal range of motion  Cervical back: Normal range of motion and neck supple  Skin:     General: Skin is warm  Neurological:      Mental Status: She is alert and oriented to person, place, and time  I/O     None          Invasive Devices     Peripheral Intravenous Line  Duration           Peripheral IV 01/29/23 Dorsal (posterior); Right Hand 4 days                Results from last 7 days   Lab Units 01/29/23  1942   WBC Thousand/uL 15 97*   NEUTROS ABS Thousands/µL 12 99*   HEMOGLOBIN g/dL 11 0*   MCV fL 92   PLATELETS Thousands/uL 335     Results from last 7 days   Lab Units 01/29/23  1942   POTASSIUM mmol/L 4 1   CHLORIDE mmol/L 106   CO2 mmol/L 23   BUN mg/dL 14   CREATININE mg/dL 0 69   EGFR ml/min/1 73sq m 118     Results from last 7 days   Lab Units 01/29/23  1942   AST U/L 38*   ALT U/L 37*     Results from last 7 days   Lab Units 01/29/23  1942   PLATELETS Thousands/uL 335     Results from last 7 days   Lab Units 02/02/23  2122 02/01/23  1752 02/01/23  0928 01/31/23  2019 01/31/23  1626 01/31/23  0658 01/30/23  2031 01/30/23  1125   POC GLUCOSE mg/dl 110 110 96 95 136 88 83 85     Results from last 7 days   Lab Units 01/30/23  1126   PROT/CREAT RATIO UR  0 09             Results from last 7 days   Lab Units 01/29/23  1943   URINE CULTURE  10,000-19,000 cfu/ml Lactobacillus species*       Brief review of pertinent history:  Past Medical History:   Diagnosis Date   • Abnormal Pap smear of cervix     2016 abn pap, colpo WNL, cryotherapy; 10/2022 HSIL pap   • Asthma    • Depression    • Preeclampsia 2016 Past Surgical History:   Procedure Laterality Date   • GYNECOLOGIC CRYOSURGERY  2016     OB History    Para Term  AB Living   2 1 1 0 0 1   SAB IAB Ectopic Multiple Live Births   0 0 0 0 1      # Outcome Date GA Lbr Tommy/2nd Weight Sex Delivery Anes PTL Lv   2 Current            1 Term 16 39w4d 11:45 / 00:26 3220 g (7 lb 1 6 oz) F Vag-Spont EPI N DAVONTE      Complications: Gestational hypertension       Fetal data:  Nonstress test: date 23  Baseline: 140  Variability: moderate  Accelerations: present, 10x10  Decelerations: absent  Contractions: present  Assessment: reactive  Plan: continue TID and PRN          MEDS:   Medication Administration - last 24 hours from 2023 0654 to 2023 0654       Date/Time Order Dose Route Action Action by     2023 2320 EST hydrOXYzine HCL (ATARAX) tablet 25 mg 25 mg Oral Given Saritha Lynn, WARD     2023 1545 EST hydrOXYzine HCL (ATARAX) tablet 25 mg 25 mg Oral Given Juliana France, WARD     2023 1118 EST hydrOXYzine HCL (ATARAX) tablet 25 mg 25 mg Oral Refused Korin Jeff, WARD     2023 2320 EST cyclobenzaprine (FLEXERIL) tablet 10 mg 10 mg Oral Given Saritha Lynn, RN     2023 1544 EST cyclobenzaprine (FLEXERIL) tablet 10 mg 10 mg Oral Given Juliana France, RN     2023 0726 EST cyclobenzaprine (FLEXERIL) tablet 10 mg 10 mg Oral Given Juliana France, RN     2023 2320 EST melatonin tablet 3 mg 3 mg Oral Given Saritha Lynn, RN     2023 8830 EST buprenorphine-naloxone (Suboxone) film 8 mg 8 mg Sublingual Given Juliana France RN        Current Facility-Administered Medications   Medication Dose Route Frequency   • buprenorphine-naloxone (Suboxone) film 8 mg  8 mg Sublingual Daily   • cyclobenzaprine (FLEXERIL) tablet 10 mg  10 mg Oral Q8H   • hydrOXYzine HCL (ATARAX) tablet 25 mg  25 mg Oral Q6H   • loperamide (IMODIUM) capsule 2 mg  2 mg Oral Q4H PRN   • melatonin tablet 3 mg  3 mg Oral HS   • ondansetron (ZOFRAN-ODT) dispersible tablet 4 mg  4 mg Oral Q6H PRN            King Imelda MD  OBGYN, PGY-4  2/3/2023  6:54 AM

## 2023-02-03 NOTE — CERTIFIED RECOVERY SPECIALIST
Certified  Note    Patient name: Eladio Gallagher  Location: L&D 327/L&D 874-66  Jamul: [address] [place] [address]  Attending:  Joe Hamlni MD MRN [id number]  : 1995  Age: 32 y o  Sex: female Date 2/3/2023         Substance Use History:     Social History     Substance and Sexual Activity   Alcohol Use Not Currently    Comment: last used 2021        Social History     Substance and Sexual Activity   Drug Use Not Currently   • Types: Heroin, Methamphetamines, Fentanyl    Comment: last used 2023       Admission Information  Substances Used at This Admission[de-identified] Opiates, Methamphetamine  Readmission in Last 30 Days?: No  Encounter Type[de-identified] Patient Face-to-Face    Recovery Support Plan  Declined All Services?: No  Medication Assisted Treatment[de-identified] Yes  Medication Therapy Type[de-identified] Induction  Medication Type[de-identified] Suboxone/Buprenorphine  Agreeable to Warm Handoff?: No  Is Patient Accepting [facility] Treatment Services?: Yes ([facility] and [facility] for OP)  Plan Discussed With Treatment Team[de-identified] Yes  Plan Discussed With[de-identified] Nurse    Referral to Recovery Supports:  Annetta Carter[de-identified] Yes  Community Based CRS[de-identified] Yes  Case Management[de-identified] Yes  Resource Guide Given?: Yes  Follow Up With Patient[de-identified] No  Family / Other Support[de-identified] No  Referral for Community Physical Health[de-identified] No  Referral for Community Mental Health[de-identified] No'    CRS received consult to meet with patient  CRS provided introductions and explanation of service  CRS explained resources with [facility] office and follow up  CRS provided explanation of recovery center resource and services available near her home  Patient shared understanding  CRS also provided contact information for additional support if she needs it as she transitions home        [facility]

## 2023-02-03 NOTE — TELEPHONE ENCOUNTER
Spoke with patient 2/3 at [phone] to schedule her Suboxone refill and her diabetes ed classes  Patient is scheduled for her Suboxone virtual visit 2/9 at 1230, GDM virtual Class #1 on 2/10 and GDM virtual Class #2 on 2/17  All visits are virtual visit  Patient is currently not set up with her Ayshat, pt was instructed to do so  Patient documented the dates and times of her appointments

## 2023-02-03 NOTE — CASE MANAGEMENT
Case Management Progress Note    Patient name Sera Fulling  Location L&D 327/L&D 272-74 MRN [id number]  : 1995 Date 2/3/2023       LOS (days): 5  Geometric Mean LOS (GMLOS) (days):   Days to GMLOS:        OBJECTIVE:        Current admission status: Inpatient  Preferred Pharmacy:   [id number] [address] [address] [facility] [facility]  Phone: [phone] Fax: [phone]    Primary Care Provider: DESMOND Díaz    Primary Insurance: [address] [place] [address]  Secondary Insurance:     PROGRESS NOTE:    [facility] from [facility] just left  a VM confirmed that patient's initial appointment at [facility] with Nighat is on  of this month, but patient cannot be seen by the toxicology doctor until  to start the Suboxone management  [facility] inquired if [facility] is able to write a bridge script until [facility] , or if OBGYN would want [facility] to look for another option  [facility] stated that [facility] is the closest option for patient, which is why they chose it originally  CM relayed this to Dr Jillene Nageotte, who stated she will reach out to [facility] and get back to

## 2023-02-03 NOTE — ASSESSMENT & PLAN NOTE
No results found for: HGBA1C    Recent Labs     01/31/23 2019 02/01/23  0928 02/01/23  1752 02/02/23 2122   POCGLU 95 96 110 110       Blood Sugar Average: Last 72 hrs:  (P) 416 [id number]     1h   Will follow outpatient with diabetes educator, referral order placed   On diabetic diet

## 2023-02-06 ENCOUNTER — TRANSITIONAL CARE MANAGEMENT (OUTPATIENT)
Dept: FAMILY MEDICINE CLINIC | Facility: CLINIC | Age: 28
End: 2023-02-06

## 2023-02-08 ENCOUNTER — TELEPHONE (OUTPATIENT)
Dept: OBGYN CLINIC | Facility: CLINIC | Age: 28
End: 2023-02-08

## 2023-02-08 NOTE — TELEPHONE ENCOUNTER
----- Message from Isabel Lynn sent at 2/8/2023  1:33 PM EST -----  Hi,     This patient missed her appt 2/1 with epi because she was in the hospital, can you please call her to re-schedule, thankyou

## 2023-02-09 ENCOUNTER — TELEPHONE (OUTPATIENT)
Dept: PERINATAL CARE | Facility: CLINIC | Age: 28
End: 2023-02-09

## 2023-02-09 ENCOUNTER — TELEPHONE (OUTPATIENT)
Dept: PERINATAL CARE | Facility: OTHER | Age: 28
End: 2023-02-09

## 2023-02-09 PROBLEM — O99.320 SUBOXONE MAINTENANCE TREATMENT COMPLICATING PREGNANCY, ANTEPARTUM (HCC): Status: ACTIVE | Noted: 2023-02-09

## 2023-02-09 PROBLEM — F11.20 SUBOXONE MAINTENANCE TREATMENT COMPLICATING PREGNANCY, ANTEPARTUM (HCC): Status: ACTIVE | Noted: 2023-02-09

## 2023-02-09 NOTE — TELEPHONE ENCOUNTER
Called patient to reschedule NO SHOW appointment:    VIRTUAL VISIT - SUBOXONE CONSULT    Left voicemail request patient to call back to schedule at [phone]

## 2023-02-09 NOTE — TELEPHONE ENCOUNTER
M for patient to return call to [facility] to schedule NST/JUAN/Dopplers weekly (growth in 3 weeks from 1/27/2023)  As per Dr Pearl Reilly recommendations on 1/27 appointment  #s provided to patient on her VM    ----- Message from Hugo Moe RN sent at 2/8/2023  4:18 PM EST -----  Please call this patient and schedule a growth scan  Per Dr Rasheed Mueller last ultrasound note she needs a growth in 3 weeks from 01/27/2023  Please encourage patient to complete NIPS  Kit was provided on 10/17/2022  Dr Rasheed Mueller follow up recommendations: 01/27/2023  1  Weekly NST, JUAN and and fetal umbilical artery Doppler  2   Growth scan in 3 weeks which was scheduled today - not scheduled as of yet      Thank you,  Hugo Moe

## 2023-02-10 ENCOUNTER — TELEPHONE (OUTPATIENT)
Dept: PERINATAL CARE | Facility: CLINIC | Age: 28
End: 2023-02-10

## 2023-02-10 NOTE — TELEPHONE ENCOUNTER
Called pt since she had not yet joined the group call for Class 1 of the Diabetes and Pregnancy Program  Spoke with pt  Pt reported she was having difficulty joining group class  Instructed to join via link sent as Text Message  Pt stated she would try to join  Pt ultimately did not join group virtual appointment

## 2023-02-13 ENCOUNTER — TELEPHONE (OUTPATIENT)
Facility: HOSPITAL | Age: 28
End: 2023-02-13

## 2023-02-13 NOTE — TELEPHONE ENCOUNTER
Called PT on 2/13/23 regarding rescheduling class 1 and 2 of diabetic education  PT did not answer, left voice message to call office back when available

## 2023-02-15 ENCOUNTER — TELEPHONE (OUTPATIENT)
Facility: HOSPITAL | Age: 28
End: 2023-02-15

## 2023-02-15 ENCOUNTER — PATIENT OUTREACH (OUTPATIENT)
Dept: OBGYN CLINIC | Facility: CLINIC | Age: 28
End: 2023-02-15

## 2023-02-15 NOTE — TELEPHONE ENCOUNTER
Called PT on 2/15 regarding a schedule change that we had to move her appt  Original appointment was 3/3 in-person class 2 at [place] location at 1 pm  We moved to in-person class 2 at 1:30 pm on 3/3 at [facility] location  PT did not answer, left voice message of new appt time, date and location

## 2023-02-15 NOTE — PROGRESS NOTES
SONG METZ reviewed pts chart, she missed her virtual suboxone appt with [facility] last week  She has no f/u PN appts scheduled with us, she has her [facility] appointment tomorrow 2/16 at 10am      SONG METZ called pt to engage her in care, left VM for return call, enc her to call us to reschedule her PN appt Enc her to attend her [facility] appointment tomorrow     [facility] referral was made prior to hospital discharge 
Snehal Marie

## 2023-02-16 ENCOUNTER — TELEPHONE (OUTPATIENT)
Dept: PSYCHIATRY | Facility: CLINIC | Age: 28
End: 2023-02-16

## 2023-02-27 ENCOUNTER — TELEPHONE (OUTPATIENT)
Facility: HOSPITAL | Age: 28
End: 2023-02-27

## 2023-02-27 NOTE — TELEPHONE ENCOUNTER
Multiple attempts to get patient in for Diabetic Classes  Numerous no shows and re-schedules    Patient has another appointment for 3/7/23 for Class 2, but we will change it to a Class 1, since that has to be completed first

## 2023-03-03 ENCOUNTER — PATIENT OUTREACH (OUTPATIENT)
Dept: OBGYN CLINIC | Facility: CLINIC | Age: 28
End: 2023-03-03

## 2023-03-03 NOTE — PROGRESS NOTES
SONG METZ reviewed chart today, pt had a case mgmt appt yesterday at [facility] and she did not attend, her upcoming toxicology appointments with [facility] have been canceled due to lack of participation in the program  Pt missed MAT appt with MFM on 2/9  We have been unable to get a hold of pt for regular OB appointments, the diabetic educator for [facility] has also had difficulty getting on touch with patient  SONG METZ called [email or web address]  Socialtext worker with [employer] [phone] she reports pt is not open with [employer] services due to inability to get in touch with the patient  SONG METZ contacted Jorge Case with [facility], he is aware of patient, SONG Metz discussed with him concern that she did not attend MAT appt with MFM or initiate with [facility] since discharged one month ago, concern for no OB care since hospital discharge as well as gestational diabetes that is not being tracked/cared for  Piero Gibbons will attempt to see if he can see patient at the sober living home in [place] today  SONG METZ following

## 2023-03-07 ENCOUNTER — TELEPHONE (OUTPATIENT)
Facility: HOSPITAL | Age: 28
End: 2023-03-07

## 2023-03-24 ENCOUNTER — PATIENT OUTREACH (OUTPATIENT)
Dept: OBGYN CLINIC | Facility: CLINIC | Age: 28
End: 2023-03-24

## 2023-03-24 NOTE — PROGRESS NOTES
Chart reviewed, pt has not engaged in further prenatal care with our office, [facility] or MFM  SW CM will close this encounter at this time due to inability to contact the patient  Will re-open if pt re-connects to care with us prior to delivery  [facility] to the [facility] living home on 3/3, pt was at work, he has tried to make contact with her prenatally with no success at this time  MIO is 4/19/2023

## 2023-03-31 ENCOUNTER — TELEPHONE (OUTPATIENT)
Facility: HOSPITAL | Age: 28
End: 2023-03-31

## 2023-03-31 NOTE — TELEPHONE ENCOUNTER
LVM 3/31 regarding a [facility] that the [facility]  PT was looking to r/s appt's and I left office number to call abck to r/s

## 2023-04-12 PROBLEM — Z3A.38 38 WEEKS GESTATION OF PREGNANCY: Status: ACTIVE | Noted: 2023-04-12

## 2023-04-13 PROBLEM — O13.9 GESTATIONAL HYPERTENSION: Status: ACTIVE | Noted: 2023-04-13

## 2023-05-03 ENCOUNTER — OFFICE VISIT (OUTPATIENT)
Dept: INTERNAL MEDICINE CLINIC | Facility: CLINIC | Age: 28
End: 2023-05-03

## 2023-05-03 VITALS
WEIGHT: 149.5 LBS | BODY MASS INDEX: 28.22 KG/M2 | HEART RATE: 100 BPM | OXYGEN SATURATION: 99 % | TEMPERATURE: 99.5 F | HEIGHT: 61 IN | SYSTOLIC BLOOD PRESSURE: 140 MMHG | DIASTOLIC BLOOD PRESSURE: 80 MMHG

## 2023-05-03 DIAGNOSIS — Z79.899 ENCOUNTER FOR MONITORING SUBOXONE MAINTENANCE THERAPY: ICD-10-CM

## 2023-05-03 DIAGNOSIS — Z79.899 MEDICATION THERAPY CONTINUED: ICD-10-CM

## 2023-05-03 DIAGNOSIS — F11.20 SUBOXONE MAINTENANCE TREATMENT COMPLICATING PREGNANCY, ANTEPARTUM (HCC): ICD-10-CM

## 2023-05-03 DIAGNOSIS — O13.9 GESTATIONAL HYPERTENSION, ANTEPARTUM: ICD-10-CM

## 2023-05-03 DIAGNOSIS — O99.213 OBESITY AFFECTING PREGNANCY IN THIRD TRIMESTER: ICD-10-CM

## 2023-05-03 DIAGNOSIS — Z51.81 ENCOUNTER FOR THERAPEUTIC DRUG LEVEL MONITORING: ICD-10-CM

## 2023-05-03 DIAGNOSIS — O99.320 SUBOXONE MAINTENANCE TREATMENT COMPLICATING PREGNANCY, ANTEPARTUM (HCC): ICD-10-CM

## 2023-05-03 DIAGNOSIS — Z51.81 ENCOUNTER FOR MONITORING SUBOXONE MAINTENANCE THERAPY: ICD-10-CM

## 2023-05-03 DIAGNOSIS — F19.20 DRUG DEPENDENCE (HCC): Primary | ICD-10-CM

## 2023-05-03 PROBLEM — Z72.0 TOBACCO ABUSE: Status: ACTIVE | Noted: 2023-05-03

## 2023-05-03 PROBLEM — O99.210 OBESITY AFFECTING PREGNANCY: Status: RESOLVED | Noted: 2022-10-11 | Resolved: 2023-05-03

## 2023-05-03 PROBLEM — O09.899 RUBELLA NON-IMMUNE STATUS, ANTEPARTUM: Status: RESOLVED | Noted: 2023-01-30 | Resolved: 2023-05-03

## 2023-05-03 PROBLEM — O36.5990 PREGNANCY AFFECTED BY FETAL GROWTH RESTRICTION: Status: RESOLVED | Noted: 2023-01-27 | Resolved: 2023-05-03

## 2023-05-03 PROBLEM — Z91.89 HISTORY OF DRUG OVERDOSE: Status: ACTIVE | Noted: 2023-05-03

## 2023-05-03 PROBLEM — Z3A.29 29 WEEKS GESTATION OF PREGNANCY: Status: RESOLVED | Noted: 2023-01-03 | Resolved: 2023-05-03

## 2023-05-03 PROBLEM — R87.619 ABNORMAL PAP SMEAR OF CERVIX: Status: RESOLVED | Noted: 2022-10-17 | Resolved: 2023-05-03

## 2023-05-03 PROBLEM — Z30.09 BIRTH CONTROL COUNSELING: Status: RESOLVED | Noted: 2022-11-08 | Resolved: 2023-05-03

## 2023-05-03 PROBLEM — Z86.32 HISTORY OF GESTATIONAL DIABETES: Status: ACTIVE | Noted: 2023-05-03

## 2023-05-03 PROBLEM — O99.323 SUBSTANCE ABUSE AFFECTING PREGNANCY IN THIRD TRIMESTER, ANTEPARTUM: Status: RESOLVED | Noted: 2023-01-30 | Resolved: 2023-05-03

## 2023-05-03 PROBLEM — O24.419 GESTATIONAL DIABETES: Status: RESOLVED | Noted: 2023-02-03 | Resolved: 2023-05-03

## 2023-05-03 PROBLEM — Z3A.38 38 WEEKS GESTATION OF PREGNANCY: Status: RESOLVED | Noted: 2023-04-12 | Resolved: 2023-05-03

## 2023-05-03 PROBLEM — F19.10 IV DRUG ABUSE (HCC): Status: ACTIVE | Noted: 2023-05-03

## 2023-05-03 PROBLEM — F19.10 SUBSTANCE ABUSE AFFECTING PREGNANCY IN THIRD TRIMESTER, ANTEPARTUM (HCC): Status: RESOLVED | Noted: 2023-01-30 | Resolved: 2023-05-03

## 2023-05-03 PROBLEM — S10.95XA: Status: RESOLVED | Noted: 2023-01-30 | Resolved: 2023-05-03

## 2023-05-03 PROBLEM — Z28.39 RUBELLA NON-IMMUNE STATUS, ANTEPARTUM: Status: RESOLVED | Noted: 2023-01-30 | Resolved: 2023-05-03

## 2023-05-03 RX ORDER — BUPRENORPHINE AND NALOXONE 8; 2 MG/1; MG/1
FILM, SOLUBLE BUCCAL; SUBLINGUAL
Qty: 7 FILM | Refills: 0 | Status: SHIPPED | OUTPATIENT
Start: 2023-05-03

## 2023-05-03 RX ORDER — NALOXONE HYDROCHLORIDE 4 MG/.1ML
1 SPRAY NASAL AS NEEDED
Qty: 1 EACH | Refills: 1 | Status: SHIPPED | OUTPATIENT
Start: 2023-05-03

## 2023-05-03 NOTE — PATIENT INSTRUCTIONS
Naloxone (Into the nose)   Naloxone Hydrochloride (nal-OX-one ellen-droe-KLOR-jada)  Treats opioid overdose in an emergency situation  Must be given as soon as possible  Brand Name(s): Kloxxado, Narcninfa   There may be other brand names for this medicine  When This Medicine Should Not Be Used: This medicine is not right for everyone  Do not use it if you had an allergic reaction to naloxone  How to Use This Medicine:   Spray  Your doctor will tell you how much medicine to use  Do not use more than directed  Your doctor or a pharmacist can tell you where to buy this medicine  It is available without a doctor's order at most major pharmacies  This medicine must be given to you (the patient) by someone else  Talk with people close to you so they know what to do in case of an emergency  Read and follow the patient instructions that come with this medicine  Talk to your doctor or pharmacist if you have any questions  This medicine is for use only in the nose  Do not get any of it in your eyes or on your skin  If it does get on these areas, rinse it off right away  To use:   Each nasal spray contains only one dose of naloxone  Do not prime or test the nasal spray  Hold the nasal spray with your thumb on the bottom of the plunger and your first and middle fingers on either side of the nozzle  Saul Hughs the patient on his or her back  Support the patient's neck with your hand and let the head tilt back  Gently insert the tip of the nozzle into one nostril, until your fingers on either side of the nozzle are against the bottom of the patient's nose  Press the plunger firmly to give the dose  Remove the nasal spray from the patient's nose  Move the patient on his or her side (recovery position)  Get emergency medical help right away  Watch the patient closely  If needed, you may give more doses every 2 to 3 minutes until the patient responds   Use a new nasal spray for each dose and spray the medicine into the other nostril each time  Store the medicine in a closed container at room temperature, away from heat, moisture, and direct light  Do not freeze or expose to excessive heat  If the spray is frozen and is needed in an emergency, do not wait for the spray to thaw  Get medical help right away  However, the spray may be thawed for 15 minutes in room temperature, and it can still be used if it has been thawed after being frozen before  Ask your pharmacist about the best way to dispose of medicine that you do not use  Drugs and Foods to Avoid:      Ask your doctor or pharmacist before using any other medicine, including over-the-counter medicines, vitamins, and herbal products  Warnings While Using This Medicine:   Tell your doctor if you are pregnant or breastfeeding, or if you have heart or blood vessel disease  This medicine should be given right away after a suspected or known overdose of an opioid (narcotic) medicine  This will help prevent serious breathing problems and severe sleepiness that can lead to death  The effects of the opioid medicine may last longer than the effects of the naloxone  This means the breathing problems and sleepiness could come back  Always call for emergency help after the first dose of naloxone  This medicine could cause withdrawal symptoms from the opioid medicine  Keep all medicine out of the reach of children  Never share your medicine with anyone  Possible Side Effects While Using This Medicine:   Call your doctor right away if you notice any of these side effects:   Allergic reaction: Itching or hives, swelling in your face or hands, swelling or tingling in your mouth or throat, chest tightness, trouble breathing  Crying more than usual (in babies)  Diarrhea, nausea, vomiting, stomach cramps or pain  Fast, pounding, or uneven heartbeat  Fever, runny nose, sneezing, sweating, yawning, discomfort in the nose  Lightheadedness, dizziness, fainting  Ongoing trouble breathing  Seizure, shaking, or feeling restless, nervous, or irritable  Unusual tiredness or weakness  If you notice these less serious side effects, talk with your doctor:   Headache  Joint or muscle pain  If you notice other side effects that you think are caused by this medicine, tell your doctor  Call your doctor for medical advice about side effects  You may report side effects to FDA at 0-921-FDA-0194  © Copyright [employer] 2022 Information is for End User's use only and may not be sold, redistributed or otherwise used for commercial purposes  The above information is an  only  It is not intended as medical advice for individual conditions or treatments  Talk to your doctor, nurse or pharmacist before following any medical regimen to see if it is safe and effective for you  Buprenorphine/Naloxone (Into the mouth)   Buprenorphine (bue-pre-NOR-feen), Naloxone (nal-OX-one)  Treats narcotic dependence  Brand Name(s): Suboxone, Zubsolv   There may be other brand names for this medicine  When This Medicine Should Not Be Used: This medicine is not right for everyone  Do not use it if you had an allergic reaction to buprenorphine or naloxone  How to Use This Medicine: Thin Sheet, Tablet  Take your medicine as directed  Your dose may need to be changed several times to find what works best for you  You must let the medicine dissolve  Never swallow the film or tablet  Your body may not absorb enough of the medicine if you swallow it  Your health caregiver should show you how to use the medicine  If you do not understand, ask for help  It is important to use the medicine correctly  Do not talk while the medicine is inside your mouth  Buccal film: Rinse your mouth with water to moisten it  Place the film against the inside of your cheek  If your doctor told you to use more than 1 film, place the second film inside your other cheek  Do not place more than 2 films inside of 1 cheek at a time  Do not move or touch the film  Do not eat or drink anything until the film is completely dissolved  Sublingual tablet: Place the tablet under your tongue  If your doctor told you to use more than 1 tablet, place all of the tablets in different places under your tongue at the same time  You can use 2 tablets at a time until you have taken all of the medicine, if that is easier for you  Let the tablets dissolve completely in your mouth  Do not eat or drink anything until the tablets are completely dissolved  Rinse your mouth with water and swallow  Wait for at least one hour before brushing your teeth  Sublingual film: Drink some water to help moisten your mouth  Place the film under your tongue  If your doctor told you to use more than 1 film, place the second film on the opposite side from the first one  Do not move the film after you placed it under your tongue  If you are supposed to use more than 2 films, use them the same way, but do not start until the first 2 films are completely dissolved  Do not eat or drink anything until the film is completely dissolved  Do not break, crush, chew, or cut the film or tablet  This medicine should come with a Medication Guide  Ask your pharmacist for a copy if you do not have one  Missed dose: Take a dose as soon as you remember  If it is almost time for your next dose, wait until then and take a regular dose  Do not take extra medicine to make up for a missed dose  Store the medicine in a closed container at room temperature, away from heat, moisture, and direct light  Drop off any unused narcotic medicine at a drug take-back location right away  If you do not have a drug take-back location near you, flush any unused narcotic medicine down the toilet  Check your local drug store and clinics for take-back locations  You can also check the [employer] web site for locations   Here is the link to the FDA safe disposal of medicines [employer] ee  Drugs and Foods to Avoid:   Ask your doctor or pharmacist before using any other medicine, including over-the-counter medicines, vitamins, and herbal products  Do not use this medicine if you are using or have used an MAO inhibitor within the past 14 days  Some medicines can affect how buprenorphine/naloxone works  Tell your doctor if you are using the following:   Carbamazepine, cyclobenzaprine, erythromycin, ketoconazole, metaxalone, mirtazapine, phenobarbital, phenytoin, rifampin, tramadol, trazodone  Diuretic (water pill)  Medicine to treat depression or mental health problems (including SNRIs, SSRIs, TCAs)  Medicine to treat HIV/AIDS (including atazanavir, delavirdine, efavirenz, etravirine, nevirapine, ritonavir)  Phenothiazine medicine  Triptan medicine to treat migraine headaches  Do not drink alcohol while you are using this medicine  Tell your doctor if you use anything else that makes you sleepy  Some examples are allergy medicine, narcotic pain medicine, and alcohol  Tell your doctor if you are also using butorphanol, nalbuphine, pentazocine, or a muscle relaxer  Warnings While Using This Medicine:   Tell your doctor if you are pregnant or breastfeeding, or if you have kidney disease, liver disease (including hepatitis), lung or breathing problems (including sleep apnea), tooth problems (including history of cavities), adrenal gland problems, an enlarged prostate, trouble urinating, gallbladder problems, thyroid problems, stomach problems, or a history of depression  Tell your doctor if you have heart disease, congestive heart failure, a slow heartbeat, or a history of heart rhythm problems (including long QT syndrome)  Tell your doctor if you have a brain tumor, head injury, or alcohol or drug abuse    This medicine may cause the following problems:  High risk of overdose, which can lead to death  Respiratory depression (serious breathing problem that can be life-threatening)  Adrenal gland problems  Sleep-related breathing problems (including sleep apnea, sleep-related hypoxemia)  Low blood pressure  Liver problems  QT prolongation (heart rhythm problem)  Tooth problems (including tooth fracture, tooth loss)  Serotonin syndrome, when used with certain medicines  This medicine may make you dizzy or drowsy  Do not drive or do anything else that could be dangerous until you know how this medicine affects you  Sit or lie down if you feel dizzy  Stand up carefully  Tell any doctor or dentist who treats you that you are using this medicine  This medicine can be habit-forming  Do not use more than your prescribed dose  Call your doctor if you think your medicine is not working  This medicine may cause constipation, especially with long-term use  Ask your doctor if you should use a laxative to prevent and treat constipation  Do not stop using this medicine suddenly  Your doctor will need to slowly decrease your dose before you stop it completely  This medicine could cause infertility  Talk with your doctor before using this medicine if you plan to have children  Your doctor will do lab tests at regular visits to check on the effects of this medicine  Keep all appointments  Keep all medicine out of the reach of children  Never share your medicine with anyone  Possible Side Effects While Using This Medicine:   Call your doctor right away if you notice any of these side effects:   Allergic reaction: Itching or hives, swelling in your face or hands, swelling or tingling in your mouth or throat, chest tightness, trouble breathing  Anxiety, restlessness, fast heartbeat, fever, muscle spasms, twitching, diarrhea, seeing or hearing things that are not there  Blue lips, fingernails, or skin, trouble breathing  Changes in skin color, dark freckles, cold feeling, tiredness, weight loss  Dark urine or pale stools, nausea, vomiting, loss of appetite, stomach pain, yellow skin or eyes  Extreme dizziness, drowsiness, or weakness, slow heartbeat, sweating, seizures, cold or clammy skin  Fast, pounding, or uneven heartbeat  Severe confusion, lightheadedness, dizziness, or fainting  Trouble breathing or slow breathing  Toothache  If you notice these less serious side effects, talk with your doctor:   Constipation, diarrhea, nausea, vomiting, stomach upset  Headache  Stuffy or runny nose  If you notice other side effects that you think are caused by this medicine, tell your doctor  Call your doctor for medical advice about side effects  You may report side effects to FDA at 4-611-FDA-3148  © Copyright Rosemarie Collins 2022 Information is for End User's use only and may not be sold, redistributed or otherwise used for commercial purposes  The above information is an  only  It is not intended as medical advice for individual conditions or treatments  Talk to your doctor, nurse or pharmacist before following any medical regimen to see if it is safe and effective for you

## 2023-05-03 NOTE — PROGRESS NOTES
Assessment/Plan:  Problem List Items Addressed This Visit        Cardiovascular and Mediastinum    RESOLVED: Gestational hypertension    Relevant Medications    buprenorphine-naloxone (Suboxone) 8-2 mg    naloxone (Narcan) 4 mg/0 1 mL nasal spray    Other Relevant Orders    [facility] Prescribed Meds and Special Instructions    Amphetamines, Methamphetamines    Butalbital    Phenobarbital    Secobarbital    Alprazolam    Clonazepam    Diazepam    Lorazepam    Gabapentin    Pregabalin    Cocaine    Heroin    Buprenorphine    Levorphanol    Meperidine    Naltrexone    Fentanyl    Methadone    Oxycodone    Tapentadol    THC    Tramadol    Codeine, Hydrocodone, Hydropmorphone, Morphine    Bath Salts    Ethyl Glucuronide/Ethyl Sulfate    Kratom    Spice    Methylphenidate    Phentermine    Validity Oxidant    Validity Creatinine    Validity pH    Validity Specific       Other    RESOLVED: Suboxone maintenance treatment complicating pregnancy, antepartum (HCC)    Relevant Medications    buprenorphine-naloxone (Suboxone) 8-2 mg    naloxone (Narcan) 4 mg/0 1 mL nasal spray    Other Relevant Orders    [facility] Prescribed Meds and Special Instructions    Amphetamines, Methamphetamines    Butalbital    Phenobarbital    Secobarbital    Alprazolam    Clonazepam    Diazepam    Lorazepam    Gabapentin    Pregabalin    Cocaine    Heroin    Buprenorphine    Levorphanol    Meperidine    Naltrexone    Fentanyl    Methadone    Oxycodone    Tapentadol    THC    Tramadol    Codeine, Hydrocodone, Hydropmorphone, Morphine    Bath Salts    Ethyl Glucuronide/Ethyl Sulfate    Kratom    Spice    Methylphenidate    Phentermine    Validity Oxidant    Validity Creatinine    Validity pH    Validity Specific    RESOLVED: Obesity affecting pregnancy    Relevant Medications    buprenorphine-naloxone (Suboxone) 8-2 mg    naloxone (Narcan) 4 mg/0 1 mL nasal spray    Other Relevant Orders    [facility] Prescribed Meds and Special Instructions    Amphetamines, Methamphetamines    Butalbital    Phenobarbital    Secobarbital    Alprazolam    Clonazepam    Diazepam    Lorazepam    Gabapentin    Pregabalin    Cocaine    Heroin    Buprenorphine    Levorphanol    Meperidine    Naltrexone    Fentanyl    Methadone    Oxycodone    Tapentadol    THC    Tramadol    Codeine, Hydrocodone, Hydropmorphone, Morphine    Bath Salts    Ethyl Glucuronide/Ethyl Sulfate    Kratom    Spice    Methylphenidate    Phentermine    Validity Oxidant    Validity Creatinine    Validity pH    Validity Specific   Other Visit Diagnoses     Drug dependence ([facility] [address] )    -  Primary    Relevant Medications    buprenorphine-naloxone (Suboxone) 8-2 mg    naloxone (Narcan) 4 mg/0 1 mL nasal spray    Other Relevant Orders    [facility] Prescribed Meds and Special Instructions    Amphetamines, Methamphetamines    Butalbital    Phenobarbital    Secobarbital    Alprazolam    Clonazepam    Diazepam    Lorazepam    Gabapentin    Pregabalin    Cocaine    Heroin    Buprenorphine    Levorphanol    Meperidine    Naltrexone    Fentanyl    Methadone    Oxycodone    Tapentadol    THC    Tramadol    Codeine, Hydrocodone, Hydropmorphone, Morphine    Bath Salts    Ethyl Glucuronide/Ethyl Sulfate    Kratom    Spice    Methylphenidate    Phentermine    Validity Oxidant    Validity Creatinine    Validity pH    Validity Specific    Encounter for monitoring Suboxone maintenance therapy        Relevant Medications    buprenorphine-naloxone (Suboxone) 8-2 mg    naloxone (Narcan) 4 mg/0 1 mL nasal spray    Other Relevant Orders    [facility] Prescribed Meds and Special Instructions    Amphetamines, Methamphetamines    Butalbital    Phenobarbital    Secobarbital    Alprazolam    Clonazepam    Diazepam    Lorazepam    Gabapentin    Pregabalin    Cocaine    Heroin    Buprenorphine    Levorphanol    Meperidine    Naltrexone    Fentanyl    Methadone    Oxycodone    Tapentadol    THC    Tramadol    Codeine, Hydrocodone, Hydropmorphone, Morphine    Bath Salts    Ethyl Glucuronide/Ethyl Sulfate    Kratom    Spice    Methylphenidate    Phentermine    Validity Oxidant    Validity Creatinine    Validity pH    Validity Specific    Medication therapy continued        Relevant Medications    buprenorphine-naloxone (Suboxone) 8-2 mg    naloxone (Narcan) 4 mg/0 1 mL nasal spray    Other Relevant Orders    [facility] Prescribed Meds and Special Instructions    Amphetamines, Methamphetamines    Butalbital    Phenobarbital    Secobarbital    Alprazolam    Clonazepam    Diazepam    Lorazepam    Gabapentin    Pregabalin    Cocaine    Heroin    Buprenorphine    Levorphanol    Meperidine    Naltrexone    Fentanyl    Methadone    Oxycodone    Tapentadol    THC    Tramadol    Codeine, Hydrocodone, Hydropmorphone, Morphine    Bath Salts    Ethyl Glucuronide/Ethyl Sulfate    Kratom    Spice    Methylphenidate    Phentermine    Validity Oxidant    Validity Creatinine    Validity pH    Validity Specific    Encounter for therapeutic drug level monitoring        Relevant Medications    buprenorphine-naloxone (Suboxone) 8-2 mg    naloxone (Narcan) 4 mg/0 1 mL nasal spray    Other Relevant Orders    [facility] Prescribed Meds and Special Instructions    Amphetamines, Methamphetamines    Butalbital    Phenobarbital    Secobarbital    Alprazolam    Clonazepam    Diazepam    Lorazepam    Gabapentin    Pregabalin    Cocaine    Heroin    Buprenorphine    Levorphanol    Meperidine    Naltrexone    Fentanyl    Methadone    Oxycodone    Tapentadol    THC    Tramadol    Codeine, Hydrocodone, Hydropmorphone, Morphine    Bath Salts    Ethyl Glucuronide/Ethyl Sulfate    Kratom    Spice    Methylphenidate    Phentermine    Validity Oxidant    Validity Creatinine    Validity pH    Validity Specific           Diagnoses and all orders for this visit:    Drug dependence (HCC)  -     buprenorphine-naloxone (Suboxone) 8-2 mg; One or two strips sl q day    - naloxone (Narcan) 4 mg/0 1 mL nasal spray; 0 1 mL (4 mg total) into each nostril as needed (respiratory depression or possible OD)  -     [facility] Prescribed Meds and Special Instructions  -     Amphetamines, Methamphetamines  -     Butalbital  -     Phenobarbital  -     Secobarbital  -     Alprazolam  -     Clonazepam  -     Diazepam  -     Lorazepam  -     Gabapentin  -     Pregabalin  -     Cocaine  -     Heroin  -     Buprenorphine  -     Levorphanol  -     Meperidine  -     Naltrexone  -     Fentanyl  -     Methadone  -     Oxycodone  -     Tapentadol  -     THC  -     Tramadol  -     Codeine, Hydrocodone, Hydropmorphone, Morphine  -     Bath Salts  -     Ethyl Glucuronide/Ethyl Sulfate  -     Kratom  -     Spice  -     Methylphenidate  -     Phentermine  -     Validity Oxidant  -     Validity Creatinine  -     Validity pH  -     Validity Specific    Suboxone maintenance treatment complicating pregnancy, antepartum (HCC)  -     buprenorphine-naloxone (Suboxone) 8-2 mg; One or two strips sl q day    -     naloxone (Narcan) 4 mg/0 1 mL nasal spray; 0 1 mL (4 mg total) into each nostril as needed (respiratory depression or possible OD)  -     [facility] Prescribed Meds and Special Instructions  -     Amphetamines, Methamphetamines  -     Butalbital  -     Phenobarbital  -     Secobarbital  -     Alprazolam  -     Clonazepam  -     Diazepam  -     Lorazepam  -     Gabapentin  -     Pregabalin  -     Cocaine  -     Heroin  -     Buprenorphine  -     Levorphanol  -     Meperidine  -     Naltrexone  -     Fentanyl  -     Methadone  -     Oxycodone  -     Tapentadol  -     THC  -     Tramadol  -     Codeine, Hydrocodone, Hydropmorphone, Morphine  -     Bath Salts  -     Ethyl Glucuronide/Ethyl Sulfate  -     Kratom  -     Spice  -     Methylphenidate  -     Phentermine  -     Validity Oxidant  -     Validity Creatinine  -     Validity pH  -     Validity Specific    Encounter for monitoring Suboxone maintenance therapy  -     buprenorphine-naloxone (Suboxone) 8-2 mg; One or two strips sl q day  -     naloxone (Narcan) 4 mg/0 1 mL nasal spray; 0 1 mL (4 mg total) into each nostril as needed (respiratory depression or possible OD)  -     [facility] All Prescribed Meds and Special Instructions  -     Amphetamines, Methamphetamines  -     Butalbital  -     Phenobarbital  -     Secobarbital  -     Alprazolam  -     Clonazepam  -     Diazepam  -     Lorazepam  -     Gabapentin  -     Pregabalin  -     Cocaine  -     Heroin  -     Buprenorphine  -     Levorphanol  -     Meperidine  -     Naltrexone  -     Fentanyl  -     Methadone  -     Oxycodone  -     Tapentadol  -     THC  -     Tramadol  -     Codeine, Hydrocodone, Hydropmorphone, Morphine  -     Bath Salts  -     Ethyl Glucuronide/Ethyl Sulfate  -     Kratom  -     Spice  -     Methylphenidate  -     Phentermine  -     Validity Oxidant  -     Validity Creatinine  -     Validity pH  -     Validity Specific    Medication therapy continued  -     buprenorphine-naloxone (Suboxone) 8-2 mg; One or two strips sl q day    -     naloxone (Narcan) 4 mg/0 1 mL nasal spray; 0 1 mL (4 mg total) into each nostril as needed (respiratory depression or possible OD)  -     [facility] All Prescribed Meds and Special Instructions  -     Amphetamines, Methamphetamines  -     Butalbital  -     Phenobarbital  -     Secobarbital  -     Alprazolam  -     Clonazepam  -     Diazepam  -     Lorazepam  -     Gabapentin  -     Pregabalin  -     Cocaine  -     Heroin  -     Buprenorphine  -     Levorphanol  -     Meperidine  -     Naltrexone  -     Fentanyl  -     Methadone  -     Oxycodone  -     Tapentadol  -     THC  -     Tramadol  -     Codeine, Hydrocodone, Hydropmorphone, Morphine  -     Bath Salts  -     Ethyl Glucuronide/Ethyl Sulfate  -     Kratom  -     Spice  -     Methylphenidate  -     Phentermine  -     Validity Oxidant  -     Validity Creatinine  -     Validity pH  - Validity Specific    Gestational hypertension, antepartum  -     buprenorphine-naloxone (Suboxone) 8-2 mg; One or two strips sl q day  -     naloxone (Narcan) 4 mg/0 1 mL nasal spray; 0 1 mL (4 mg total) into each nostril as needed (respiratory depression or possible OD)  -     [facility] All Prescribed Meds and Special Instructions  -     Amphetamines, Methamphetamines  -     Butalbital  -     Phenobarbital  -     Secobarbital  -     Alprazolam  -     Clonazepam  -     Diazepam  -     Lorazepam  -     Gabapentin  -     Pregabalin  -     Cocaine  -     Heroin  -     Buprenorphine  -     Levorphanol  -     Meperidine  -     Naltrexone  -     Fentanyl  -     Methadone  -     Oxycodone  -     Tapentadol  -     THC  -     Tramadol  -     Codeine, Hydrocodone, Hydropmorphone, Morphine  -     Bath Salts  -     Ethyl Glucuronide/Ethyl Sulfate  -     Kratom  -     Spice  -     Methylphenidate  -     Phentermine  -     Validity Oxidant  -     Validity Creatinine  -     Validity pH  -     Validity Specific    Obesity affecting pregnancy in third trimester  -     buprenorphine-naloxone (Suboxone) 8-2 mg; One or two strips sl q day    -     naloxone (Narcan) 4 mg/0 1 mL nasal spray; 0 1 mL (4 mg total) into each nostril as needed (respiratory depression or possible OD)  -     [facility] Prescribed Meds and Special Instructions  -     Amphetamines, Methamphetamines  -     Butalbital  -     Phenobarbital  -     Secobarbital  -     Alprazolam  -     Clonazepam  -     Diazepam  -     Lorazepam  -     Gabapentin  -     Pregabalin  -     Cocaine  -     Heroin  -     Buprenorphine  -     Levorphanol  -     Meperidine  -     Naltrexone  -     Fentanyl  -     Methadone  -     Oxycodone  -     Tapentadol  -     THC  -     Tramadol  -     Codeine, Hydrocodone, Hydropmorphone, Morphine  -     Bath Salts  -     Ethyl Glucuronide/Ethyl Sulfate  -     Kratom  -     Spice  -     Methylphenidate  -     Phentermine  -     Validity Oxidant  - Validity Creatinine  -     Validity pH  -     Validity Specific    Encounter for therapeutic drug level monitoring  -     buprenorphine-naloxone (Suboxone) 8-2 mg; One or two strips sl q day  -     naloxone (Narcan) 4 mg/0 1 mL nasal spray; 0 1 mL (4 mg total) into each nostril as needed (respiratory depression or possible OD)  -     [facility] All Prescribed Meds and Special Instructions  -     Amphetamines, Methamphetamines  -     Butalbital  -     Phenobarbital  -     Secobarbital  -     Alprazolam  -     Clonazepam  -     Diazepam  -     Lorazepam  -     Gabapentin  -     Pregabalin  -     Cocaine  -     Heroin  -     Buprenorphine  -     Levorphanol  -     Meperidine  -     Naltrexone  -     Fentanyl  -     Methadone  -     Oxycodone  -     Tapentadol  -     THC  -     Tramadol  -     Codeine, Hydrocodone, Hydropmorphone, Morphine  -     Bath Salts  -     Ethyl Glucuronide/Ethyl Sulfate  -     Kratom  -     Spice  -     Methylphenidate  -     Phentermine  -     Validity Oxidant  -     Validity Creatinine  -     Validity pH  -     Validity Specific      No problem-specific Assessment & Plan notes found for this encounter  A/P: Doing ok and current 8mg has been effective  Tolerating the med  Will continue with 8mg q day, films, dose 1/6  UDT obtained  Narcan given to have on hand  Reminded to keep meds safe and out of reach of children  To f/u for counseling  RTC one week for f/u  Subjective:      Patient ID: Lisbet Delacruz is a 32 y o  female  WF, pt of Ms Lupe Jordan, presents for her initial suboxone program visit  Pt s/p IUP with delivery of a healthy viable baby  Course complicated by h/o drug dependency and pt was on sublclade and eventually switched to suboxone  Pt is not breastfeeding  Has been maintained on 8mg q day  Pt reports starting to abuse drugs with meth nasally at age 22  Continued this pattern until age 30, when she switched to IV heroin  Max'd out at three bundles/day  Continued until recently and including early on in her pregnancy and last used 4/11/23  During this time, reports three attempts at detox and on IP stay with subsequent relapses  One OD  Some trouble with the law in the past, but not currently  Awaiting counseling due to insurance issues  The following portions of the patient's history were reviewed and updated as appropriate:   She has a past medical history of Abnormal Pap smear of cervix, Asthma, Depression, and Preeclampsia (2016)  ,  does not have any pertinent problems on file  ,   has a past surgical history that includes Gynecologic cryosurgery (2016)  ,  family history includes Hypertension in her father  ,   reports that she has been smoking cigarettes  She has a 3 75 pack-year smoking history  She has never been exposed to tobacco smoke  She has never used smokeless tobacco  She reports that she does not currently use alcohol  She reports that she does not currently use drugs after having used the following drugs: Heroin, Methamphetamines, and Fentanyl ,  has No Known Allergies     Current Outpatient Medications   Medication Sig Dispense Refill    buprenorphine-naloxone (Suboxone) 8-2 mg Place 1 Film (8 mg total) under the tongue daily for 16 days Do not start before April 18, 2023  16 Film 0    buprenorphine-naloxone (Suboxone) 8-2 mg One or two strips sl q day  7 Film 0    naloxone (NARCAN) 4 mg/0 1 mL nasal spray Administer 1 spray into a nostril  If no response after 2-3 minutes, give another dose in the other nostril using a new spray  1 each 0    naloxone (Narcan) 4 mg/0 1 mL nasal spray 0 1 mL (4 mg total) into each nostril as needed (respiratory depression or possible OD) 1 each 1     No current facility-administered medications for this visit  Review of Systems   Constitutional: Negative for activity change, chills, diaphoresis, fatigue and fever  Respiratory: Negative for cough, chest tightness, shortness of breath and wheezing  "  Cardiovascular: Negative for chest pain, palpitations and leg swelling  Gastrointestinal: Negative for abdominal pain, constipation, diarrhea, nausea and vomiting  Genitourinary: Negative for difficulty urinating, dysuria and frequency  Musculoskeletal: Negative for arthralgias, gait problem and myalgias  Neurological: Negative for dizziness, seizures, syncope, weakness, light-headedness and headaches  Psychiatric/Behavioral: Negative for confusion, dysphoric mood and sleep disturbance  The patient is not nervous/anxious  PHQ-2/9 Depression Screening    Little interest or pleasure in doing things: 0 - not at all  Feeling down, depressed, or hopeless: 0 - not at all  PHQ-2 Score: 0  PHQ-2 Interpretation: Negative depression screen        Objective:  Vitals:    05/03/23 1241   BP: 140/80   Pulse: 100   Temp: 99 5 °F (37 5 °C)   SpO2: 99%   Weight: 67 8 kg (149 lb 8 oz)   Height: 5' 1\" (1 549 m)     Body mass index is 28 25 kg/m²  Physical Exam  Vitals and nursing note reviewed  Constitutional:       General: She is not in acute distress  Appearance: Normal appearance  She is not ill-appearing  HENT:      Head: Normocephalic and atraumatic  Mouth/Throat:      Mouth: Mucous membranes are moist    Eyes:      Extraocular Movements: Extraocular movements intact  Conjunctiva/sclera: Conjunctivae normal       Pupils: Pupils are equal, round, and reactive to light  Cardiovascular:      Rate and Rhythm: Normal rate and regular rhythm  Heart sounds: Normal heart sounds  No murmur heard  Pulmonary:      Effort: Pulmonary effort is normal  No respiratory distress  Breath sounds: Normal breath sounds  No wheezing, rhonchi or rales  Abdominal:      General: Bowel sounds are normal  There is no distension  Palpations: Abdomen is soft  Tenderness: There is no abdominal tenderness  Neurological:      General: No focal deficit present        Mental Status: She is alert " and oriented to person, place, and time  Mental status is at baseline  Psychiatric:         Mood and Affect: Mood normal          Behavior: Behavior normal          Thought Content: Thought content normal          Judgment: Judgment normal        Scheduled Medication Review:  Pt's scheduled medication use was reviewed by myself/staff via the [employer] website  Pt's use has been found to be appropriate w/o any concerns for misuse by the patient  Pt's current conditions require continued scheduled medication use at this time  Future review for continued appropriate medication use and misuse will continue

## 2023-05-06 LAB
6MAM UR QL CFM: NEGATIVE NG/ML
7AMINOCLONAZEPAM UR QL CFM: NEGATIVE NG/ML
A-OH ALPRAZ UR QL CFM: NEGATIVE NG/ML
ACCEPTABLE CREAT UR QL: NORMAL MG/DL
ACCEPTIBLE SP GR UR QL: NORMAL
AMPHET UR QL CFM: NEGATIVE NG/ML
BUPRENORPHINE UR QL CFM: NORMAL NG/ML
BUTALBITAL UR QL CFM: NEGATIVE NG/ML
BZE UR QL CFM: NEGATIVE NG/ML
CODEINE UR QL CFM: NEGATIVE NG/ML
EDDP UR QL CFM: NEGATIVE NG/ML
ETHYL GLUCURONIDE UR QL CFM: NEGATIVE NG/ML
ETHYL SULFATE UR QL SCN: NEGATIVE NG/ML
EUTYLONE UR QL: NEGATIVE NG/ML
FENTANYL UR QL CFM: NEGATIVE NG/ML
GLIADIN IGG SER IA-ACNC: NEGATIVE NG/ML
HYDROCODONE UR QL CFM: NEGATIVE NG/ML
HYDROMORPHONE UR QL CFM: NEGATIVE NG/ML
LORAZEPAM UR QL CFM: NEGATIVE NG/ML
ME-PHENIDATE UR QL CFM: NEGATIVE NG/ML
MEPERIDINE UR QL CFM: NEGATIVE NG/ML
METHADONE UR QL CFM: NEGATIVE NG/ML
METHAMPHET UR QL CFM: NEGATIVE NG/ML
MORPHINE UR QL CFM: NEGATIVE NG/ML
NALTREXONE UR QL CFM: NEGATIVE NG/ML
NITRITE UR QL: NORMAL UG/ML
NORBUPRENORPHINE UR QL CFM: NORMAL NG/ML
NORDIAZEPAM UR QL CFM: NEGATIVE NG/ML
NORFENTANYL UR QL CFM: NEGATIVE NG/ML
NORHYDROCODONE UR QL CFM: NEGATIVE NG/ML
NORMEPERIDINE UR QL CFM: NEGATIVE NG/ML
NOROXYCODONE UR QL CFM: NEGATIVE NG/ML
OXAZEPAM UR QL CFM: NEGATIVE NG/ML
OXYCODONE UR QL CFM: NEGATIVE NG/ML
OXYMORPHONE UR QL CFM: NEGATIVE NG/ML
PARA-FLUOROFENTANYL QUANTIFICATION: NORMAL NG/ML
PHENOBARB UR QL CFM: NEGATIVE NG/ML
RESULT ALL_PRESCRIBED MEDS AND SPECIAL INSTRUCTIONS: NORMAL
SECOBARBITAL UR QL CFM: NEGATIVE NG/ML
SL AMB 4-ANPP QUANTIFICATION: NORMAL NG/ML
SL AMB 5F-ADB-M7 METABOLITE QUANTIFICATION: NEGATIVE NG/ML
SL AMB 7-OH-MITRAGYNINE (KRATOM ALKALOID) QUANTIFICATION: NEGATIVE NG/ML
SL AMB AB-FUBINACA-M3 METABOLITE QUANTIFICATION: NEGATIVE NG/ML
SL AMB ACETYL FENTANYL QUANTIFICATION: NORMAL NG/ML
SL AMB ACETYL NORFENTANYL QUANTIFICATION: NORMAL NG/ML
SL AMB ACRYL FENTANYL QUANTIFICATION: NORMAL NG/ML
SL AMB CARFENTANIL QUANTIFICATION: NORMAL NG/ML
SL AMB CTHC (MARIJUANA METABOLITE) QUANTIFICATION: NEGATIVE NG/ML
SL AMB DEXTRORPHAN (DEXTROMETHORPHAN METABOLITE) QUANT: NEGATIVE NG/ML
SL AMB GABAPENTIN QUANTIFICATION: NEGATIVE
SL AMB JWH018 METABOLITE QUANTIFICATION: NEGATIVE NG/ML
SL AMB JWH073 METABOLITE QUANTIFICATION: NEGATIVE NG/ML
SL AMB MDMB-FUBINACA-M1 METABOLITE QUANTIFICATION: NEGATIVE NG/ML
SL AMB METHYLONE QUANTIFICATION: NEGATIVE NG/ML
SL AMB N-DESMETHYL-TRAMADOL QUANTIFICATION: NEGATIVE NG/ML
SL AMB PHENTERMINE QUANTIFICATION: NEGATIVE NG/ML
SL AMB PREGABALIN QUANTIFICATION: NEGATIVE
SL AMB RCS4 METABOLITE QUANTIFICATION: NEGATIVE NG/ML
SL AMB RITALINIC ACID QUANTIFICATION: NEGATIVE NG/ML
SMOOTH MUSCLE AB TITR SER IF: NEGATIVE NG/ML
SPECIMEN DRAWN SERPL: NEGATIVE NG/ML
SPECIMEN PH ACCEPTABLE UR: NORMAL
TAPENTADOL UR QL CFM: NEGATIVE NG/ML
TEMAZEPAM UR QL CFM: NEGATIVE NG/ML
TRAMADOL UR QL CFM: NEGATIVE NG/ML
URATE/CREAT 24H UR: NEGATIVE NG/ML

## 2023-05-10 ENCOUNTER — OFFICE VISIT (OUTPATIENT)
Dept: INTERNAL MEDICINE CLINIC | Facility: CLINIC | Age: 28
End: 2023-05-10

## 2023-05-10 VITALS
BODY MASS INDEX: 28.51 KG/M2 | SYSTOLIC BLOOD PRESSURE: 124 MMHG | HEART RATE: 74 BPM | DIASTOLIC BLOOD PRESSURE: 72 MMHG | OXYGEN SATURATION: 98 % | TEMPERATURE: 98.4 F | HEIGHT: 61 IN | WEIGHT: 151 LBS

## 2023-05-10 DIAGNOSIS — Z51.81 ENCOUNTER FOR THERAPEUTIC DRUG LEVEL MONITORING: ICD-10-CM

## 2023-05-10 DIAGNOSIS — Z51.81 ENCOUNTER FOR MONITORING SUBOXONE MAINTENANCE THERAPY: ICD-10-CM

## 2023-05-10 DIAGNOSIS — F19.20 DRUG DEPENDENCE (HCC): Primary | ICD-10-CM

## 2023-05-10 DIAGNOSIS — Z79.899 MEDICATION THERAPY CONTINUED: ICD-10-CM

## 2023-05-10 DIAGNOSIS — O99.213 OBESITY AFFECTING PREGNANCY IN THIRD TRIMESTER: ICD-10-CM

## 2023-05-10 DIAGNOSIS — O99.320 SUBOXONE MAINTENANCE TREATMENT COMPLICATING PREGNANCY, ANTEPARTUM (HCC): ICD-10-CM

## 2023-05-10 DIAGNOSIS — F11.20 SUBOXONE MAINTENANCE TREATMENT COMPLICATING PREGNANCY, ANTEPARTUM (HCC): ICD-10-CM

## 2023-05-10 DIAGNOSIS — Z79.899 ENCOUNTER FOR MONITORING SUBOXONE MAINTENANCE THERAPY: ICD-10-CM

## 2023-05-10 DIAGNOSIS — O13.9 GESTATIONAL HYPERTENSION, ANTEPARTUM: ICD-10-CM

## 2023-05-10 RX ORDER — BUPRENORPHINE AND NALOXONE 8; 2 MG/1; MG/1
FILM, SOLUBLE BUCCAL; SUBLINGUAL
Qty: 60 FILM | Refills: 0 | Status: SHIPPED | OUTPATIENT
Start: 2023-05-10

## 2023-05-10 NOTE — PROGRESS NOTES
Assessment/Plan:  Problem List Items Addressed This Visit    None  Visit Diagnoses     Drug dependence ([facility] [address] )    -  Primary    Relevant Medications    buprenorphine-naloxone (Suboxone) 8-2 mg    Other Relevant Orders    [facility] Prescribed Meds and Special Instructions    Amphetamines, Methamphetamines    Butalbital    Phenobarbital    Secobarbital    Alprazolam    Clonazepam    Diazepam    Lorazepam    Gabapentin    Pregabalin    Cocaine    Heroin    Buprenorphine    Levorphanol    Meperidine    Naltrexone    Fentanyl    Methadone    Oxycodone    Tapentadol    THC    Tramadol    Codeine, Hydrocodone, Hydropmorphone, Morphine    Bath Salts    Ethyl Glucuronide/Ethyl Sulfate    Kratom    Spice    Methylphenidate    Phentermine    Validity Oxidant    Validity Creatinine    Validity pH    Validity Specific    Suboxone maintenance treatment complicating pregnancy, antepartum (HCC)        Relevant Medications    buprenorphine-naloxone (Suboxone) 8-2 mg    Other Relevant Orders    [facility] Prescribed Meds and Special Instructions    Amphetamines, Methamphetamines    Butalbital    Phenobarbital    Secobarbital    Alprazolam    Clonazepam    Diazepam    Lorazepam    Gabapentin    Pregabalin    Cocaine    Heroin    Buprenorphine    Levorphanol    Meperidine    Naltrexone    Fentanyl    Methadone    Oxycodone    Tapentadol    THC    Tramadol    Codeine, Hydrocodone, Hydropmorphone, Morphine    Bath Salts    Ethyl Glucuronide/Ethyl Sulfate    Kratom    Spice    Methylphenidate    Phentermine    Validity Oxidant    Validity Creatinine    Validity pH    Validity Specific    Encounter for monitoring Suboxone maintenance therapy        Relevant Medications    buprenorphine-naloxone (Suboxone) 8-2 mg    Other Relevant Orders    [facility] Prescribed Meds and Special Instructions    Amphetamines, Methamphetamines    Butalbital    Phenobarbital    Secobarbital    Alprazolam    Clonazepam    Diazepam    Lorazepam Gabapentin    Pregabalin    Cocaine    Heroin    Buprenorphine    Levorphanol    Meperidine    Naltrexone    Fentanyl    Methadone    Oxycodone    Tapentadol    THC    Tramadol    Codeine, Hydrocodone, Hydropmorphone, Morphine    Bath Salts    Ethyl Glucuronide/Ethyl Sulfate    Kratom    Spice    Methylphenidate    Phentermine    Validity Oxidant    Validity Creatinine    Validity pH    Validity Specific    Encounter for therapeutic drug level monitoring        Relevant Medications    buprenorphine-naloxone (Suboxone) 8-2 mg    Other Relevant Orders    [facility] Prescribed Meds and Special Instructions    Amphetamines, Methamphetamines    Butalbital    Phenobarbital    Secobarbital    Alprazolam    Clonazepam    Diazepam    Lorazepam    Gabapentin    Pregabalin    Cocaine    Heroin    Buprenorphine    Levorphanol    Meperidine    Naltrexone    Fentanyl    Methadone    Oxycodone    Tapentadol    THC    Tramadol    Codeine, Hydrocodone, Hydropmorphone, Morphine    Bath Salts    Ethyl Glucuronide/Ethyl Sulfate    Kratom    Spice    Methylphenidate    Phentermine    Validity Oxidant    Validity Creatinine    Validity pH    Validity Specific    Medication therapy continued        Relevant Medications    buprenorphine-naloxone (Suboxone) 8-2 mg    Other Relevant Orders    [facility] Prescribed Meds and Special Instructions    Amphetamines, Methamphetamines    Butalbital    Phenobarbital    Secobarbital    Alprazolam    Clonazepam    Diazepam    Lorazepam    Gabapentin    Pregabalin    Cocaine    Heroin    Buprenorphine    Levorphanol    Meperidine    Naltrexone    Fentanyl    Methadone    Oxycodone    Tapentadol    THC    Tramadol    Codeine, Hydrocodone, Hydropmorphone, Morphine    Bath Salts    Ethyl Glucuronide/Ethyl Sulfate    Kratom    Spice    Methylphenidate    Phentermine    Validity Oxidant    Validity Creatinine    Validity pH    Validity Specific    Gestational hypertension, antepartum        Relevant Medications    buprenorphine-naloxone (Suboxone) 8-2 mg    Other Relevant Orders    [facility] Prescribed Meds and Special Instructions    Amphetamines, Methamphetamines    Butalbital    Phenobarbital    Secobarbital    Alprazolam    Clonazepam    Diazepam    Lorazepam    Gabapentin    Pregabalin    Cocaine    Heroin    Buprenorphine    Levorphanol    Meperidine    Naltrexone    Fentanyl    Methadone    Oxycodone    Tapentadol    THC    Tramadol    Codeine, Hydrocodone, Hydropmorphone, Morphine    Bath Salts    Ethyl Glucuronide/Ethyl Sulfate    Kratom    Spice    Methylphenidate    Phentermine    Validity Oxidant    Validity Creatinine    Validity pH    Validity Specific    Obesity affecting pregnancy in third trimester        Relevant Medications    buprenorphine-naloxone (Suboxone) 8-2 mg    Other Relevant Orders    [facility] Prescribed Meds and Special Instructions    Amphetamines, Methamphetamines    Butalbital    Phenobarbital    Secobarbital    Alprazolam    Clonazepam    Diazepam    Lorazepam    Gabapentin    Pregabalin    Cocaine    Heroin    Buprenorphine    Levorphanol    Meperidine    Naltrexone    Fentanyl    Methadone    Oxycodone    Tapentadol    THC    Tramadol    Codeine, Hydrocodone, Hydropmorphone, Morphine    Bath Salts    Ethyl Glucuronide/Ethyl Sulfate    Kratom    Spice    Methylphenidate    Phentermine    Validity Oxidant    Validity Creatinine    Validity pH    Validity Specific           Diagnoses and all orders for this visit:    Drug dependence (HCC)  -     buprenorphine-naloxone (Suboxone) 8-2 mg; One or two strips sl q day    -     [facility] Prescribed Meds and Special Instructions  -     Amphetamines, Methamphetamines  -     Butalbital  -     Phenobarbital  -     Secobarbital  -     Alprazolam  -     Clonazepam  -     Diazepam  -     Lorazepam  -     Gabapentin  -     Pregabalin  -     Cocaine  -     Heroin  -     Buprenorphine  -     Levorphanol  -     Meperidine  - Naltrexone  -     Fentanyl  -     Methadone  -     Oxycodone  -     Tapentadol  -     THC  -     Tramadol  -     Codeine, Hydrocodone, Hydropmorphone, Morphine  -     Bath Salts  -     Ethyl Glucuronide/Ethyl Sulfate  -     Kratom  -     Spice  -     Methylphenidate  -     Phentermine  -     Validity Oxidant  -     Validity Creatinine  -     Validity pH  -     Validity Specific    Suboxone maintenance treatment complicating pregnancy, antepartum (HCC)  -     buprenorphine-naloxone (Suboxone) 8-2 mg; One or two strips sl q day  -     [facility] Prescribed Meds and Special Instructions  -     Amphetamines, Methamphetamines  -     Butalbital  -     Phenobarbital  -     Secobarbital  -     Alprazolam  -     Clonazepam  -     Diazepam  -     Lorazepam  -     Gabapentin  -     Pregabalin  -     Cocaine  -     Heroin  -     Buprenorphine  -     Levorphanol  -     Meperidine  -     Naltrexone  -     Fentanyl  -     Methadone  -     Oxycodone  -     Tapentadol  -     THC  -     Tramadol  -     Codeine, Hydrocodone, Hydropmorphone, Morphine  -     Bath Salts  -     Ethyl Glucuronide/Ethyl Sulfate  -     Kratom  -     Spice  -     Methylphenidate  -     Phentermine  -     Validity Oxidant  -     Validity Creatinine  -     Validity pH  -     Validity Specific    Encounter for monitoring Suboxone maintenance therapy  -     buprenorphine-naloxone (Suboxone) 8-2 mg; One or two strips sl q day    -     [facility] Prescribed Meds and Special Instructions  -     Amphetamines, Methamphetamines  -     Butalbital  -     Phenobarbital  -     Secobarbital  -     Alprazolam  -     Clonazepam  -     Diazepam  -     Lorazepam  -     Gabapentin  -     Pregabalin  -     Cocaine  -     Heroin  -     Buprenorphine  -     Levorphanol  -     Meperidine  -     Naltrexone  -     Fentanyl  -     Methadone  -     Oxycodone  -     Tapentadol  -     THC  -     Tramadol  -     Codeine, Hydrocodone, Hydropmorphone, Morphine  -     Bath Salts  - Ethyl Glucuronide/Ethyl Sulfate  -     Kratom  -     Spice  -     Methylphenidate  -     Phentermine  -     Validity Oxidant  -     Validity Creatinine  -     Validity pH  -     Validity Specific    Encounter for therapeutic drug level monitoring  -     buprenorphine-naloxone (Suboxone) 8-2 mg; One or two strips sl q day  -     [facility] Prescribed Meds and Special Instructions  -     Amphetamines, Methamphetamines  -     Butalbital  -     Phenobarbital  -     Secobarbital  -     Alprazolam  -     Clonazepam  -     Diazepam  -     Lorazepam  -     Gabapentin  -     Pregabalin  -     Cocaine  -     Heroin  -     Buprenorphine  -     Levorphanol  -     Meperidine  -     Naltrexone  -     Fentanyl  -     Methadone  -     Oxycodone  -     Tapentadol  -     THC  -     Tramadol  -     Codeine, Hydrocodone, Hydropmorphone, Morphine  -     Bath Salts  -     Ethyl Glucuronide/Ethyl Sulfate  -     Kratom  -     Spice  -     Methylphenidate  -     Phentermine  -     Validity Oxidant  -     Validity Creatinine  -     Validity pH  -     Validity Specific    Medication therapy continued  -     buprenorphine-naloxone (Suboxone) 8-2 mg; One or two strips sl q day    -     [facility] Prescribed Meds and Special Instructions  -     Amphetamines, Methamphetamines  -     Butalbital  -     Phenobarbital  -     Secobarbital  -     Alprazolam  -     Clonazepam  -     Diazepam  -     Lorazepam  -     Gabapentin  -     Pregabalin  -     Cocaine  -     Heroin  -     Buprenorphine  -     Levorphanol  -     Meperidine  -     Naltrexone  -     Fentanyl  -     Methadone  -     Oxycodone  -     Tapentadol  -     THC  -     Tramadol  -     Codeine, Hydrocodone, Hydropmorphone, Morphine  -     Bath Salts  -     Ethyl Glucuronide/Ethyl Sulfate  -     Kratom  -     Spice  -     Methylphenidate  -     Phentermine  -     Validity Oxidant  -     Validity Creatinine  -     Validity pH  -     Validity Specific    Gestational hypertension, antepartum  -     buprenorphine-naloxone (Suboxone) 8-2 mg; One or two strips sl q day  -     [facility] Prescribed Meds and Special Instructions  -     Amphetamines, Methamphetamines  -     Butalbital  -     Phenobarbital  -     Secobarbital  -     Alprazolam  -     Clonazepam  -     Diazepam  -     Lorazepam  -     Gabapentin  -     Pregabalin  -     Cocaine  -     Heroin  -     Buprenorphine  -     Levorphanol  -     Meperidine  -     Naltrexone  -     Fentanyl  -     Methadone  -     Oxycodone  -     Tapentadol  -     THC  -     Tramadol  -     Codeine, Hydrocodone, Hydropmorphone, Morphine  -     Bath Salts  -     Ethyl Glucuronide/Ethyl Sulfate  -     Kratom  -     Spice  -     Methylphenidate  -     Phentermine  -     Validity Oxidant  -     Validity Creatinine  -     Validity pH  -     Validity Specific    Obesity affecting pregnancy in third trimester  -     buprenorphine-naloxone (Suboxone) 8-2 mg; One or two strips sl q day  -     [facility] Prescribed Meds and Special Instructions  -     Amphetamines, Methamphetamines  -     Butalbital  -     Phenobarbital  -     Secobarbital  -     Alprazolam  -     Clonazepam  -     Diazepam  -     Lorazepam  -     Gabapentin  -     Pregabalin  -     Cocaine  -     Heroin  -     Buprenorphine  -     Levorphanol  -     Meperidine  -     Naltrexone  -     Fentanyl  -     Methadone  -     Oxycodone  -     Tapentadol  -     THC  -     Tramadol  -     Codeine, Hydrocodone, Hydropmorphone, Morphine  -     Bath Salts  -     Ethyl Glucuronide/Ethyl Sulfate  -     Kratom  -     Spice  -     Methylphenidate  -     Phentermine  -     Validity Oxidant  -     Validity Creatinine  -     Validity pH  -     Validity Specific      No problem-specific Assessment & Plan notes found for this encounter  Scheduled Medication Review:  Pt's scheduled medication use was reviewed by myself/staff via the [employer] website   Pt's use has been found to be appropriate w/o any concerns for misuse by the patient  Pt's current conditions require continued scheduled medication use at this time  Future review for continued appropriate medication use and misuse will continue  A/P: Doing well and will continue 16mg, films, dose 1/6 and get into  counseling  Reminded to keep meds safe and out of reach of children  RTC 4 weeks  Subjective: WF presents for f/u suboxone  Doing well and no c/o's  Not using and no withdraw  Has yet to start counseling  UDT obtained today  Tolerating the meds and no side effects  Patient ID: Gee Mccoy is a 32 y o  female  HPI    The following portions of the patient's history were reviewed and updated as appropriate:   She has a past medical history of Abnormal Pap smear of cervix, Asthma, Depression, and Preeclampsia (2016)  ,  does not have any pertinent problems on file  ,   has a past surgical history that includes Gynecologic cryosurgery (2016)  ,  family history includes Hypertension in her father  ,   reports that she has been smoking cigarettes  She has a 3 75 pack-year smoking history  She has never been exposed to tobacco smoke  She has never used smokeless tobacco  She reports that she does not currently use alcohol  She reports that she does not currently use drugs after having used the following drugs: Heroin, Methamphetamines, and Fentanyl ,  has No Known Allergies     Current Outpatient Medications   Medication Sig Dispense Refill   • buprenorphine-naloxone (Suboxone) 8-2 mg One or two strips sl q day  60 Film 0   • naloxone (Narcan) 4 mg/0 1 mL nasal spray 0 1 mL (4 mg total) into each nostril as needed (respiratory depression or possible OD) 1 each 1   • naloxone (NARCAN) 4 mg/0 1 mL nasal spray Administer 1 spray into a nostril  If no response after 2-3 minutes, give another dose in the other nostril using a new spray  1 each 0     No current facility-administered medications for this visit         Review of Systems   Constitutional: "Negative for activity change, chills, diaphoresis, fatigue and fever  Respiratory: Negative for cough, chest tightness, shortness of breath and wheezing  Cardiovascular: Negative for chest pain, palpitations and leg swelling  Gastrointestinal: Negative for abdominal pain, constipation, diarrhea, nausea and vomiting  Genitourinary: Negative for difficulty urinating, dysuria and frequency  Musculoskeletal: Negative for arthralgias, gait problem and myalgias  Neurological: Negative for dizziness, seizures, syncope, weakness, light-headedness and headaches  Psychiatric/Behavioral: Negative for confusion, dysphoric mood and sleep disturbance  The patient is not nervous/anxious  PHQ-2/9 Depression Screening          Objective:  Vitals:    05/10/23 1502   BP: 124/72   Pulse: 74   Temp: 98 4 °F (36 9 °C)   SpO2: 98%   Weight: 68 5 kg (151 lb)   Height: 5' 1\" (1 549 m)     Body mass index is 28 53 kg/m²  Physical Exam  Vitals and nursing note reviewed  Constitutional:       General: She is not in acute distress  Appearance: Normal appearance  She is not ill-appearing  HENT:      Head: Normocephalic and atraumatic  Mouth/Throat:      Mouth: Mucous membranes are moist    Eyes:      Extraocular Movements: Extraocular movements intact  Conjunctiva/sclera: Conjunctivae normal       Pupils: Pupils are equal, round, and reactive to light  Cardiovascular:      Rate and Rhythm: Normal rate and regular rhythm  Heart sounds: Normal heart sounds  No murmur heard  Pulmonary:      Effort: Pulmonary effort is normal  No respiratory distress  Breath sounds: Normal breath sounds  No wheezing, rhonchi or rales  Abdominal:      General: Bowel sounds are normal  There is no distension  Palpations: Abdomen is soft  Tenderness: There is no abdominal tenderness  Neurological:      General: No focal deficit present  Mental Status: She is alert  Mental status is at baseline   " Psychiatric:         Mood and Affect: Mood normal          Behavior: Behavior normal          Thought Content:  Thought content normal          Judgment: Judgment normal

## 2023-05-10 NOTE — PATIENT INSTRUCTIONS
Buprenorphine/Naloxone (Into the mouth)   Buprenorphine (bue-pre-NOR-feen), Naloxone (nal-OX-one)  Treats narcotic dependence  Brand Name(s): Suboxone, Zubsolv   There may be other brand names for this medicine  When This Medicine Should Not Be Used: This medicine is not right for everyone  Do not use it if you had an allergic reaction to buprenorphine or naloxone  How to Use This Medicine: Thin Sheet, Tablet  Take your medicine as directed  Your dose may need to be changed several times to find what works best for you  You must let the medicine dissolve  Never swallow the film or tablet  Your body may not absorb enough of the medicine if you swallow it  Your health caregiver should show you how to use the medicine  If you do not understand, ask for help  It is important to use the medicine correctly  Do not talk while the medicine is inside your mouth  Buccal film: Rinse your mouth with water to moisten it  Place the film against the inside of your cheek  If your doctor told you to use more than 1 film, place the second film inside your other cheek  Do not place more than 2 films inside of 1 cheek at a time  Do not move or touch the film  Do not eat or drink anything until the film is completely dissolved  Sublingual tablet: Place the tablet under your tongue  If your doctor told you to use more than 1 tablet, place all of the tablets in different places under your tongue at the same time  You can use 2 tablets at a time until you have taken all of the medicine, if that is easier for you  Let the tablets dissolve completely in your mouth  Do not eat or drink anything until the tablets are completely dissolved  Rinse your mouth with water and swallow  Wait for at least one hour before brushing your teeth  Sublingual film: Drink some water to help moisten your mouth  Place the film under your tongue   If your doctor told you to use more than 1 film, place the second film on the opposite side from the first one  Do not move the film after you placed it under your tongue  If you are supposed to use more than 2 films, use them the same way, but do not start until the first 2 films are completely dissolved  Do not eat or drink anything until the film is completely dissolved  Do not break, crush, chew, or cut the film or tablet  This medicine should come with a Medication Guide  Ask your pharmacist for a copy if you do not have one  Missed dose: Take a dose as soon as you remember  If it is almost time for your next dose, wait until then and take a regular dose  Do not take extra medicine to make up for a missed dose  Store the medicine in a closed container at room temperature, away from heat, moisture, and direct light  Drop off any unused narcotic medicine at a drug take-back location right away  If you do not have a drug take-back location near you, flush any unused narcotic medicine down the toilet  Check your local drug store and clinics for take-back locations  You can also check the [employer] web site for locations  Here is the link to the [employer] safe disposal of medicines [employer] com ee  Drugs and Foods to Avoid:   Ask your doctor or pharmacist before using any other medicine, including over-the-counter medicines, vitamins, and herbal products  Do not use this medicine if you are using or have used an MAO inhibitor within the past 14 days  Some medicines can affect how buprenorphine/naloxone works   Tell your doctor if you are using the following:   Carbamazepine, cyclobenzaprine, erythromycin, ketoconazole, metaxalone, mirtazapine, phenobarbital, phenytoin, rifampin, tramadol, trazodone  Diuretic (water pill)  Medicine to treat depression or mental health problems (including SNRIs, SSRIs, TCAs)  Medicine to treat HIV/AIDS (including atazanavir, delavirdine, efavirenz, etravirine, nevirapine, ritonavir)  Phenothiazine medicine  Triptan medicine to treat migraine headaches  Do not drink alcohol while you are using this medicine  Tell your doctor if you use anything else that makes you sleepy  Some examples are allergy medicine, narcotic pain medicine, and alcohol  Tell your doctor if you are also using butorphanol, nalbuphine, pentazocine, or a muscle relaxer  Warnings While Using This Medicine:   Tell your doctor if you are pregnant or breastfeeding, or if you have kidney disease, liver disease (including hepatitis), lung or breathing problems (including sleep apnea), tooth problems (including history of cavities), adrenal gland problems, an enlarged prostate, trouble urinating, gallbladder problems, thyroid problems, stomach problems, or a history of depression  Tell your doctor if you have heart disease, congestive heart failure, a slow heartbeat, or a history of heart rhythm problems (including long QT syndrome)  Tell your doctor if you have a brain tumor, head injury, or alcohol or drug abuse  This medicine may cause the following problems:  High risk of overdose, which can lead to death  Respiratory depression (serious breathing problem that can be life-threatening)  Adrenal gland problems  Sleep-related breathing problems (including sleep apnea, sleep-related hypoxemia)  Low blood pressure  Liver problems  QT prolongation (heart rhythm problem)  Tooth problems (including tooth fracture, tooth loss)  Serotonin syndrome, when used with certain medicines  This medicine may make you dizzy or drowsy  Do not drive or do anything else that could be dangerous until you know how this medicine affects you  Sit or lie down if you feel dizzy  Stand up carefully  Tell any doctor or dentist who treats you that you are using this medicine  This medicine can be habit-forming  Do not use more than your prescribed dose  Call your doctor if you think your medicine is not working    This medicine may cause constipation, especially with long-term use  Ask your doctor if you should use a laxative to prevent and treat constipation  Do not stop using this medicine suddenly  Your doctor will need to slowly decrease your dose before you stop it completely  This medicine could cause infertility  Talk with your doctor before using this medicine if you plan to have children  Your doctor will do lab tests at regular visits to check on the effects of this medicine  Keep all appointments  Keep all medicine out of the reach of children  Never share your medicine with anyone  Possible Side Effects While Using This Medicine:   Call your doctor right away if you notice any of these side effects: Allergic reaction: Itching or hives, swelling in your face or hands, swelling or tingling in your mouth or throat, chest tightness, trouble breathing  Anxiety, restlessness, fast heartbeat, fever, muscle spasms, twitching, diarrhea, seeing or hearing things that are not there  Blue lips, fingernails, or skin, trouble breathing  Changes in skin color, dark freckles, cold feeling, tiredness, weight loss  Dark urine or pale stools, nausea, vomiting, loss of appetite, stomach pain, yellow skin or eyes  Extreme dizziness, drowsiness, or weakness, slow heartbeat, sweating, seizures, cold or clammy skin  Fast, pounding, or uneven heartbeat  Severe confusion, lightheadedness, dizziness, or fainting  Trouble breathing or slow breathing  Toothache  If you notice these less serious side effects, talk with your doctor:   Constipation, diarrhea, nausea, vomiting, stomach upset  Headache  Stuffy or runny nose  If you notice other side effects that you think are caused by this medicine, tell your doctor  Call your doctor for medical advice about side effects   You may report side effects to FDA at 0-200-FDA-3566  © Copyright [employer] 2022 Information is for End User's use only and may not be sold, redistributed or otherwise used for commercial purposes  The above information is an  only  It is not intended as medical advice for individual conditions or treatments  Talk to your doctor, nurse or pharmacist before following any medical regimen to see if it is safe and effective for you

## 2023-06-08 ENCOUNTER — OFFICE VISIT (OUTPATIENT)
Dept: INTERNAL MEDICINE CLINIC | Facility: CLINIC | Age: 28
End: 2023-06-08
Payer: COMMERCIAL

## 2023-06-08 VITALS
WEIGHT: 151 LBS | DIASTOLIC BLOOD PRESSURE: 74 MMHG | HEART RATE: 98 BPM | TEMPERATURE: 99.5 F | SYSTOLIC BLOOD PRESSURE: 116 MMHG | HEIGHT: 61 IN | BODY MASS INDEX: 28.51 KG/M2 | OXYGEN SATURATION: 98 %

## 2023-06-08 DIAGNOSIS — Z79.899 MEDICATION THERAPY CONTINUED: ICD-10-CM

## 2023-06-08 DIAGNOSIS — F19.20 DRUG DEPENDENCE (HCC): Primary | ICD-10-CM

## 2023-06-08 DIAGNOSIS — O99.213 OBESITY AFFECTING PREGNANCY IN THIRD TRIMESTER: ICD-10-CM

## 2023-06-08 DIAGNOSIS — O99.320 SUBOXONE MAINTENANCE TREATMENT COMPLICATING PREGNANCY, ANTEPARTUM (HCC): ICD-10-CM

## 2023-06-08 DIAGNOSIS — Z51.81 ENCOUNTER FOR THERAPEUTIC DRUG LEVEL MONITORING: ICD-10-CM

## 2023-06-08 DIAGNOSIS — F11.20 SUBOXONE MAINTENANCE TREATMENT COMPLICATING PREGNANCY, ANTEPARTUM (HCC): ICD-10-CM

## 2023-06-08 DIAGNOSIS — Z79.899 ENCOUNTER FOR MONITORING SUBOXONE MAINTENANCE THERAPY: ICD-10-CM

## 2023-06-08 DIAGNOSIS — O13.9 GESTATIONAL HYPERTENSION, ANTEPARTUM: ICD-10-CM

## 2023-06-08 DIAGNOSIS — Z51.81 ENCOUNTER FOR MONITORING SUBOXONE MAINTENANCE THERAPY: ICD-10-CM

## 2023-06-08 PROCEDURE — 99213 OFFICE O/P EST LOW 20 MIN: CPT | Performed by: INTERNAL MEDICINE

## 2023-06-08 RX ORDER — BUPRENORPHINE AND NALOXONE 8; 2 MG/1; MG/1
FILM, SOLUBLE BUCCAL; SUBLINGUAL
Qty: 60 FILM | Refills: 0 | Status: SHIPPED | OUTPATIENT
Start: 2023-06-08

## 2023-06-08 NOTE — PROGRESS NOTES
Assessment/Plan:  Problem List Items Addressed This Visit    None  Visit Diagnoses     Drug dependence ([facility] [address] )    -  Primary    Relevant Medications    buprenorphine-naloxone (Suboxone) 8-2 mg    Other Relevant Orders    [facility] Prescribed Meds and Special Instructions    Amphetamines, Methamphetamines    Butalbital    Phenobarbital    Secobarbital    Alprazolam    Clonazepam    Diazepam    Lorazepam    Gabapentin    Pregabalin    Cocaine    Heroin    Buprenorphine    Levorphanol    Meperidine    Naltrexone    Fentanyl    Methadone    Oxycodone    Tapentadol    THC    Tramadol    Codeine, Hydrocodone, Hydropmorphone, Morphine    Bath Salts    Ethyl Glucuronide/Ethyl Sulfate    Kratom    Spice    Methylphenidate    Phentermine    Validity Oxidant    Validity Creatinine    Validity pH    Validity Specific    Suboxone maintenance treatment complicating pregnancy, antepartum (HCC)        Relevant Medications    buprenorphine-naloxone (Suboxone) 8-2 mg    Other Relevant Orders    [facility] Prescribed Meds and Special Instructions    Amphetamines, Methamphetamines    Butalbital    Phenobarbital    Secobarbital    Alprazolam    Clonazepam    Diazepam    Lorazepam    Gabapentin    Pregabalin    Cocaine    Heroin    Buprenorphine    Levorphanol    Meperidine    Naltrexone    Fentanyl    Methadone    Oxycodone    Tapentadol    THC    Tramadol    Codeine, Hydrocodone, Hydropmorphone, Morphine    Bath Salts    Ethyl Glucuronide/Ethyl Sulfate    Kratom    Spice    Methylphenidate    Phentermine    Validity Oxidant    Validity Creatinine    Validity pH    Validity Specific    Encounter for monitoring Suboxone maintenance therapy        Relevant Medications    buprenorphine-naloxone (Suboxone) 8-2 mg    Other Relevant Orders    [facility] Prescribed Meds and Special Instructions    Amphetamines, Methamphetamines    Butalbital    Phenobarbital    Secobarbital    Alprazolam    Clonazepam    Diazepam    Lorazepam Gabapentin    Pregabalin    Cocaine    Heroin    Buprenorphine    Levorphanol    Meperidine    Naltrexone    Fentanyl    Methadone    Oxycodone    Tapentadol    THC    Tramadol    Codeine, Hydrocodone, Hydropmorphone, Morphine    Bath Salts    Ethyl Glucuronide/Ethyl Sulfate    Kratom    Spice    Methylphenidate    Phentermine    Validity Oxidant    Validity Creatinine    Validity pH    Validity Specific    Encounter for therapeutic drug level monitoring        Relevant Medications    buprenorphine-naloxone (Suboxone) 8-2 mg    Other Relevant Orders    [facility] Prescribed Meds and Special Instructions    Amphetamines, Methamphetamines    Butalbital    Phenobarbital    Secobarbital    Alprazolam    Clonazepam    Diazepam    Lorazepam    Gabapentin    Pregabalin    Cocaine    Heroin    Buprenorphine    Levorphanol    Meperidine    Naltrexone    Fentanyl    Methadone    Oxycodone    Tapentadol    THC    Tramadol    Codeine, Hydrocodone, Hydropmorphone, Morphine    Bath Salts    Ethyl Glucuronide/Ethyl Sulfate    Kratom    Spice    Methylphenidate    Phentermine    Validity Oxidant    Validity Creatinine    Validity pH    Validity Specific    Medication therapy continued        Relevant Medications    buprenorphine-naloxone (Suboxone) 8-2 mg    Other Relevant Orders    [facility] Prescribed Meds and Special Instructions    Amphetamines, Methamphetamines    Butalbital    Phenobarbital    Secobarbital    Alprazolam    Clonazepam    Diazepam    Lorazepam    Gabapentin    Pregabalin    Cocaine    Heroin    Buprenorphine    Levorphanol    Meperidine    Naltrexone    Fentanyl    Methadone    Oxycodone    Tapentadol    THC    Tramadol    Codeine, Hydrocodone, Hydropmorphone, Morphine    Bath Salts    Ethyl Glucuronide/Ethyl Sulfate    Kratom    Spice    Methylphenidate    Phentermine    Validity Oxidant    Validity Creatinine    Validity pH    Validity Specific    Gestational hypertension, antepartum        Relevant Medications    buprenorphine-naloxone (Suboxone) 8-2 mg    Other Relevant Orders    [facility] Prescribed Meds and Special Instructions    Amphetamines, Methamphetamines    Butalbital    Phenobarbital    Secobarbital    Alprazolam    Clonazepam    Diazepam    Lorazepam    Gabapentin    Pregabalin    Cocaine    Heroin    Buprenorphine    Levorphanol    Meperidine    Naltrexone    Fentanyl    Methadone    Oxycodone    Tapentadol    THC    Tramadol    Codeine, Hydrocodone, Hydropmorphone, Morphine    Bath Salts    Ethyl Glucuronide/Ethyl Sulfate    Kratom    Spice    Methylphenidate    Phentermine    Validity Oxidant    Validity Creatinine    Validity pH    Validity Specific    Obesity affecting pregnancy in third trimester        Relevant Medications    buprenorphine-naloxone (Suboxone) 8-2 mg    Other Relevant Orders    [facility] Prescribed Meds and Special Instructions    Amphetamines, Methamphetamines    Butalbital    Phenobarbital    Secobarbital    Alprazolam    Clonazepam    Diazepam    Lorazepam    Gabapentin    Pregabalin    Cocaine    Heroin    Buprenorphine    Levorphanol    Meperidine    Naltrexone    Fentanyl    Methadone    Oxycodone    Tapentadol    THC    Tramadol    Codeine, Hydrocodone, Hydropmorphone, Morphine    Bath Salts    Ethyl Glucuronide/Ethyl Sulfate    Kratom    Spice    Methylphenidate    Phentermine    Validity Oxidant    Validity Creatinine    Validity pH    Validity Specific           Diagnoses and all orders for this visit:    Drug dependence (HCC)  -     [facility]  -     Amphetamines, Methamphetamines  -     Butalbital  -     Phenobarbital  -     Secobarbital  -     Alprazolam  -     Clonazepam  -     Diazepam  -     Lorazepam  -     Gabapentin  -     Pregabalin  -     Cocaine  -     Heroin  -     Buprenorphine  -     Levorphanol  -     Meperidine  -     Naltrexone  -     Fentanyl  -     Methadone  -     Oxycodone  - Tapentadol  -     THC  -     Tramadol  -     Codeine, Hydrocodone, Hydropmorphone, Morphine  -     Bath Salts  -     Ethyl Glucuronide/Ethyl Sulfate  -     Kratom  -     Spice  -     Methylphenidate  -     Phentermine  -     Validity Oxidant  -     Validity Creatinine  -     Validity pH  -     Validity Specific  -     buprenorphine-naloxone (Suboxone) 8-2 mg; One or two strips sl q day  Suboxone maintenance treatment complicating pregnancy, antepartum (HCC)  -     [facility] All Prescribed Meds and Special Instructions  -     Amphetamines, Methamphetamines  -     Butalbital  -     Phenobarbital  -     Secobarbital  -     Alprazolam  -     Clonazepam  -     Diazepam  -     Lorazepam  -     Gabapentin  -     Pregabalin  -     Cocaine  -     Heroin  -     Buprenorphine  -     Levorphanol  -     Meperidine  -     Naltrexone  -     Fentanyl  -     Methadone  -     Oxycodone  -     Tapentadol  -     THC  -     Tramadol  -     Codeine, Hydrocodone, Hydropmorphone, Morphine  -     Bath Salts  -     Ethyl Glucuronide/Ethyl Sulfate  -     Kratom  -     Spice  -     Methylphenidate  -     Phentermine  -     Validity Oxidant  -     Validity Creatinine  -     Validity pH  -     Validity Specific  -     buprenorphine-naloxone (Suboxone) 8-2 mg; One or two strips sl q day      Encounter for monitoring Suboxone maintenance therapy  -     [facility] All Prescribed Meds and Special Instructions  -     Amphetamines, Methamphetamines  -     Butalbital  -     Phenobarbital  -     Secobarbital  -     Alprazolam  -     Clonazepam  -     Diazepam  -     Lorazepam  -     Gabapentin  -     Pregabalin  -     Cocaine  -     Heroin  -     Buprenorphine  -     Levorphanol  -     Meperidine  -     Naltrexone  -     Fentanyl  -     Methadone  -     Oxycodone  -     Tapentadol  -     THC  -     Tramadol  -     Codeine, Hydrocodone, Hydropmorphone, Morphine  -     Bath Salts  -     Ethyl Glucuronide/Ethyl Sulfate  -     Kratom  -     Spice  - Methylphenidate  -     Phentermine  -     Validity Oxidant  -     Validity Creatinine  -     Validity pH  -     Validity Specific  -     buprenorphine-naloxone (Suboxone) 8-2 mg; One or two strips sl q day  Encounter for therapeutic drug level monitoring  -     [facility]  -     Amphetamines, Methamphetamines  -     Butalbital  -     Phenobarbital  -     Secobarbital  -     Alprazolam  -     Clonazepam  -     Diazepam  -     Lorazepam  -     Gabapentin  -     Pregabalin  -     Cocaine  -     Heroin  -     Buprenorphine  -     Levorphanol  -     Meperidine  -     Naltrexone  -     Fentanyl  -     Methadone  -     Oxycodone  -     Tapentadol  -     THC  -     Tramadol  -     Codeine, Hydrocodone, Hydropmorphone, Morphine  -     Bath Salts  -     Ethyl Glucuronide/Ethyl Sulfate  -     Kratom  -     Spice  -     Methylphenidate  -     Phentermine  -     Validity Oxidant  -     Validity Creatinine  -     Validity pH  -     Validity Specific  -     buprenorphine-naloxone (Suboxone) 8-2 mg; One or two strips sl q day  Medication therapy continued  -     [facility] All Prescribed Meds and Special Instructions  -     Amphetamines, Methamphetamines  -     Butalbital  -     Phenobarbital  -     Secobarbital  -     Alprazolam  -     Clonazepam  -     Diazepam  -     Lorazepam  -     Gabapentin  -     Pregabalin  -     Cocaine  -     Heroin  -     Buprenorphine  -     Levorphanol  -     Meperidine  -     Naltrexone  -     Fentanyl  -     Methadone  -     Oxycodone  -     Tapentadol  -     THC  -     Tramadol  -     Codeine, Hydrocodone, Hydropmorphone, Morphine  -     Bath Salts  -     Ethyl Glucuronide/Ethyl Sulfate  -     Kratom  -     Spice  -     Methylphenidate  -     Phentermine  -     Validity Oxidant  -     Validity Creatinine  -     Validity pH  -     Validity Specific  -     buprenorphine-naloxone (Suboxone) 8-2 mg; One or two strips sl q day      Gestational hypertension, antepartum  -     [facility] All Prescribed Meds and Special Instructions  -     Amphetamines, Methamphetamines  -     Butalbital  -     Phenobarbital  -     Secobarbital  -     Alprazolam  -     Clonazepam  -     Diazepam  -     Lorazepam  -     Gabapentin  -     Pregabalin  -     Cocaine  -     Heroin  -     Buprenorphine  -     Levorphanol  -     Meperidine  -     Naltrexone  -     Fentanyl  -     Methadone  -     Oxycodone  -     Tapentadol  -     THC  -     Tramadol  -     Codeine, Hydrocodone, Hydropmorphone, Morphine  -     Bath Salts  -     Ethyl Glucuronide/Ethyl Sulfate  -     Kratom  -     Spice  -     Methylphenidate  -     Phentermine  -     Validity Oxidant  -     Validity Creatinine  -     Validity pH  -     Validity Specific  -     buprenorphine-naloxone (Suboxone) 8-2 mg; One or two strips sl q day  Obesity affecting pregnancy in third trimester  -     [facility] All Prescribed Meds and Special Instructions  -     Amphetamines, Methamphetamines  -     Butalbital  -     Phenobarbital  -     Secobarbital  -     Alprazolam  -     Clonazepam  -     Diazepam  -     Lorazepam  -     Gabapentin  -     Pregabalin  -     Cocaine  -     Heroin  -     Buprenorphine  -     Levorphanol  -     Meperidine  -     Naltrexone  -     Fentanyl  -     Methadone  -     Oxycodone  -     Tapentadol  -     THC  -     Tramadol  -     Codeine, Hydrocodone, Hydropmorphone, Morphine  -     Bath Salts  -     Ethyl Glucuronide/Ethyl Sulfate  -     Kratom  -     Spice  -     Methylphenidate  -     Phentermine  -     Validity Oxidant  -     Validity Creatinine  -     Validity pH  -     Validity Specific  -     buprenorphine-naloxone (Suboxone) 8-2 mg; One or two strips sl q day  No problem-specific Assessment & Plan notes found for this encounter  Scheduled Medication Review:  Pt's scheduled medication use was reviewed by myself/staff via the [employer] website   Pt's use has been found to be appropriate w/o any concerns for misuse by the patient  Pt's current conditions require continued scheduled medication use at this time  Future review for continued appropriate medication use and misuse will continue  A/P: Doing well and will continue 16mg films, dose 2/6 and get into  counseling  Reminded to keep meds safe and out of reach of children  RTC 4 weeks  Subjective: WF presents for f/u suboxone  Doing well and no c/o's  Not using and no withdraw  Doesn't  attend counseling  UDT obtained today  Tolerating the meds and no side effects  Patient ID: Yojana Cramer is a 32 y o  female  HPI    The following portions of the patient's history were reviewed and updated as appropriate:   She has a past medical history of Abnormal Pap smear of cervix, Asthma, Depression, and Preeclampsia (2016)  ,  does not have any pertinent problems on file  ,   has a past surgical history that includes Gynecologic cryosurgery (2016)  ,  family history includes Hypertension in her father  ,   reports that she has been smoking cigarettes  She has a 3 75 pack-year smoking history  She has never been exposed to tobacco smoke  She has never used smokeless tobacco  She reports that she does not currently use alcohol  She reports that she does not currently use drugs after having used the following drugs: Heroin, Methamphetamines, and Fentanyl ,  has No Known Allergies     Current Outpatient Medications   Medication Sig Dispense Refill   • buprenorphine-naloxone (Suboxone) 8-2 mg One or two strips sl q day  60 Film 0   • naloxone (NARCAN) 4 mg/0 1 mL nasal spray Administer 1 spray into a nostril  If no response after 2-3 minutes, give another dose in the other nostril using a new spray  1 each 0   • naloxone (Narcan) 4 mg/0 1 mL nasal spray 0 1 mL (4 mg total) into each nostril as needed (respiratory depression or possible OD) 1 each 1     No current facility-administered medications for this visit         Review of Systems "  Constitutional: Negative for activity change, chills, diaphoresis, fatigue and fever  Respiratory: Negative for cough, chest tightness, shortness of breath and wheezing  Cardiovascular: Negative for chest pain, palpitations and leg swelling  Gastrointestinal: Negative for abdominal pain, constipation, diarrhea, nausea and vomiting  Genitourinary: Negative for difficulty urinating, dysuria and frequency  Musculoskeletal: Negative for arthralgias, gait problem and myalgias  Neurological: Negative for dizziness, seizures, syncope, weakness, light-headedness and headaches  Psychiatric/Behavioral: Negative for confusion, self-injury, sleep disturbance and suicidal ideas  The patient is not nervous/anxious  PHQ-2/9 Depression Screening          Objective:  Vitals:    06/08/23 1544   BP: 116/74   Pulse: 98   Temp: 99 5 °F (37 5 °C)   SpO2: 98%   Weight: 68 5 kg (151 lb)   Height: 5' 1\" (1 549 m)     Body mass index is 28 53 kg/m²  Physical Exam  Vitals and nursing note reviewed  Constitutional:       General: She is not in acute distress  Appearance: Normal appearance  She is not ill-appearing  HENT:      Head: Normocephalic and atraumatic  Mouth/Throat:      Mouth: Mucous membranes are moist    Eyes:      Extraocular Movements: Extraocular movements intact  Conjunctiva/sclera: Conjunctivae normal       Pupils: Pupils are equal, round, and reactive to light  Cardiovascular:      Rate and Rhythm: Normal rate and regular rhythm  Heart sounds: Normal heart sounds  No murmur heard  Pulmonary:      Effort: Pulmonary effort is normal  No respiratory distress  Breath sounds: Normal breath sounds  No wheezing, rhonchi or rales  Abdominal:      General: Bowel sounds are normal  There is no distension  Palpations: Abdomen is soft  Tenderness: There is no abdominal tenderness  Neurological:      General: No focal deficit present        Mental Status: She is alert " and oriented to person, place, and time  Mental status is at baseline  Psychiatric:         Mood and Affect: Mood normal          Behavior: Behavior normal          Thought Content:  Thought content normal          Judgment: Judgment normal

## 2023-06-08 NOTE — PATIENT INSTRUCTIONS
Buprenorphine/Naloxone (Into the mouth)   Buprenorphine (bue-pre-NOR-feen), Naloxone (nal-OX-one)  Treats narcotic dependence  Brand Name(s): Suboxone, Zubsolv   There may be other brand names for this medicine  When This Medicine Should Not Be Used: This medicine is not right for everyone  Do not use it if you had an allergic reaction to buprenorphine or naloxone  How to Use This Medicine: Thin Sheet, Tablet  Take your medicine as directed  Your dose may need to be changed several times to find what works best for you  You must let the medicine dissolve  Never swallow the film or tablet  Your body may not absorb enough of the medicine if you swallow it  Your health caregiver should show you how to use the medicine  If you do not understand, ask for help  It is important to use the medicine correctly  Do not talk while the medicine is inside your mouth  Buccal film: Rinse your mouth with water to moisten it  Place the film against the inside of your cheek  If your doctor told you to use more than 1 film, place the second film inside your other cheek  Do not place more than 2 films inside of 1 cheek at a time  Do not move or touch the film  Do not eat or drink anything until the film is completely dissolved  Sublingual tablet: Place the tablet under your tongue  If your doctor told you to use more than 1 tablet, place all of the tablets in different places under your tongue at the same time  You can use 2 tablets at a time until you have taken all of the medicine, if that is easier for you  Let the tablets dissolve completely in your mouth  Do not eat or drink anything until the tablets are completely dissolved  Rinse your mouth with water and swallow  Wait for at least one hour before brushing your teeth  Sublingual film: Drink some water to help moisten your mouth  Place the film under your tongue   If your doctor told you to use more than 1 film, place the second film on the opposite side from the first one  Do not move the film after you placed it under your tongue  If you are supposed to use more than 2 films, use them the same way, but do not start until the first 2 films are completely dissolved  Do not eat or drink anything until the film is completely dissolved  Do not break, crush, chew, or cut the film or tablet  This medicine should come with a Medication Guide  Ask your pharmacist for a copy if you do not have one  Missed dose: Take a dose as soon as you remember  If it is almost time for your next dose, wait until then and take a regular dose  Do not take extra medicine to make up for a missed dose  Store the medicine in a closed container at room temperature, away from heat, moisture, and direct light  Drop off any unused narcotic medicine at a drug take-back location right away  If you do not have a drug take-back location near you, flush any unused narcotic medicine down the toilet  Check your local drug store and clinics for take-back locations  You can also check the [employer] web site for locations  Here is the link to the [employer] safe disposal of medicines [employer] com ee  Drugs and Foods to Avoid:   Ask your doctor or pharmacist before using any other medicine, including over-the-counter medicines, vitamins, and herbal products  Do not use this medicine if you are using or have used an MAO inhibitor within the past 14 days  Some medicines can affect how buprenorphine/naloxone works   Tell your doctor if you are using the following:   Carbamazepine, cyclobenzaprine, erythromycin, ketoconazole, metaxalone, mirtazapine, phenobarbital, phenytoin, rifampin, tramadol, trazodone  Diuretic (water pill)  Medicine to treat depression or mental health problems (including SNRIs, SSRIs, TCAs)  Medicine to treat HIV/AIDS (including atazanavir, delavirdine, efavirenz, etravirine, nevirapine, ritonavir)  Phenothiazine medicine  Triptan medicine to treat migraine headaches  Do not drink alcohol while you are using this medicine  Tell your doctor if you use anything else that makes you sleepy  Some examples are allergy medicine, narcotic pain medicine, and alcohol  Tell your doctor if you are also using butorphanol, nalbuphine, pentazocine, or a muscle relaxer  Warnings While Using This Medicine:   Tell your doctor if you are pregnant or breastfeeding, or if you have kidney disease, liver disease (including hepatitis), lung or breathing problems (including sleep apnea), tooth problems (including history of cavities), adrenal gland problems, an enlarged prostate, trouble urinating, gallbladder problems, thyroid problems, stomach problems, or a history of depression  Tell your doctor if you have heart disease, congestive heart failure, a slow heartbeat, or a history of heart rhythm problems (including long QT syndrome)  Tell your doctor if you have a brain tumor, head injury, or alcohol or drug abuse  This medicine may cause the following problems:  High risk of overdose, which can lead to death  Respiratory depression (serious breathing problem that can be life-threatening)  Adrenal gland problems  Sleep-related breathing problems (including sleep apnea, sleep-related hypoxemia)  Low blood pressure  Liver problems  QT prolongation (heart rhythm problem)  Tooth problems (including tooth fracture, tooth loss)  Serotonin syndrome, when used with certain medicines  This medicine may make you dizzy or drowsy  Do not drive or do anything else that could be dangerous until you know how this medicine affects you  Sit or lie down if you feel dizzy  Stand up carefully  Tell any doctor or dentist who treats you that you are using this medicine  This medicine can be habit-forming  Do not use more than your prescribed dose  Call your doctor if you think your medicine is not working    This medicine may cause constipation, especially with long-term use  Ask your doctor if you should use a laxative to prevent and treat constipation  Do not stop using this medicine suddenly  Your doctor will need to slowly decrease your dose before you stop it completely  This medicine could cause infertility  Talk with your doctor before using this medicine if you plan to have children  Your doctor will do lab tests at regular visits to check on the effects of this medicine  Keep all appointments  Keep all medicine out of the reach of children  Never share your medicine with anyone  Possible Side Effects While Using This Medicine:   Call your doctor right away if you notice any of these side effects: Allergic reaction: Itching or hives, swelling in your face or hands, swelling or tingling in your mouth or throat, chest tightness, trouble breathing  Anxiety, restlessness, fast heartbeat, fever, muscle spasms, twitching, diarrhea, seeing or hearing things that are not there  Blue lips, fingernails, or skin, trouble breathing  Changes in skin color, dark freckles, cold feeling, tiredness, weight loss  Dark urine or pale stools, nausea, vomiting, loss of appetite, stomach pain, yellow skin or eyes  Extreme dizziness, drowsiness, or weakness, slow heartbeat, sweating, seizures, cold or clammy skin  Fast, pounding, or uneven heartbeat  Severe confusion, lightheadedness, dizziness, or fainting  Trouble breathing or slow breathing  Toothache  If you notice these less serious side effects, talk with your doctor:   Constipation, diarrhea, nausea, vomiting, stomach upset  Headache  Stuffy or runny nose  If you notice other side effects that you think are caused by this medicine, tell your doctor  Call your doctor for medical advice about side effects   You may report side effects to FDA at 2-488-FDA-6573  © Copyright Leti Pedra[employer] 2022 Information is for End User's use only and may not be sold, redistributed or otherwise used for commercial purposes  The above information is an  only  It is not intended as medical advice for individual conditions or treatments  Talk to your doctor, nurse or pharmacist before following any medical regimen to see if it is safe and effective for you

## 2023-07-06 ENCOUNTER — OFFICE VISIT (OUTPATIENT)
Dept: INTERNAL MEDICINE CLINIC | Facility: CLINIC | Age: 28
End: 2023-07-06
Payer: COMMERCIAL

## 2023-07-06 VITALS
DIASTOLIC BLOOD PRESSURE: 80 MMHG | HEIGHT: 61 IN | TEMPERATURE: 99 F | SYSTOLIC BLOOD PRESSURE: 130 MMHG | OXYGEN SATURATION: 98 % | BODY MASS INDEX: 28.51 KG/M2 | HEART RATE: 80 BPM | WEIGHT: 151 LBS

## 2023-07-06 DIAGNOSIS — O99.320 SUBOXONE MAINTENANCE TREATMENT COMPLICATING PREGNANCY, ANTEPARTUM (HCC): ICD-10-CM

## 2023-07-06 DIAGNOSIS — O13.9 GESTATIONAL HYPERTENSION, ANTEPARTUM: ICD-10-CM

## 2023-07-06 DIAGNOSIS — Z51.81 ENCOUNTER FOR MONITORING SUBOXONE MAINTENANCE THERAPY: ICD-10-CM

## 2023-07-06 DIAGNOSIS — F19.20 DRUG DEPENDENCE (HCC): Primary | ICD-10-CM

## 2023-07-06 DIAGNOSIS — Z51.81 ENCOUNTER FOR THERAPEUTIC DRUG LEVEL MONITORING: ICD-10-CM

## 2023-07-06 DIAGNOSIS — F11.20 SUBOXONE MAINTENANCE TREATMENT COMPLICATING PREGNANCY, ANTEPARTUM (HCC): ICD-10-CM

## 2023-07-06 DIAGNOSIS — Z79.899 MEDICATION THERAPY CONTINUED: ICD-10-CM

## 2023-07-06 DIAGNOSIS — Z79.899 ENCOUNTER FOR MONITORING SUBOXONE MAINTENANCE THERAPY: ICD-10-CM

## 2023-07-06 DIAGNOSIS — O99.213 OBESITY AFFECTING PREGNANCY IN THIRD TRIMESTER: ICD-10-CM

## 2023-07-06 PROCEDURE — 99213 OFFICE O/P EST LOW 20 MIN: CPT | Performed by: INTERNAL MEDICINE

## 2023-07-06 RX ORDER — BUPRENORPHINE AND NALOXONE 8; 2 MG/1; MG/1
FILM, SOLUBLE BUCCAL; SUBLINGUAL
Qty: 60 FILM | Refills: 0 | Status: SHIPPED | OUTPATIENT
Start: 2023-07-06

## 2023-07-06 NOTE — PROGRESS NOTES
Assessment/Plan:  Problem List Items Addressed This Visit    None  Visit Diagnoses     Drug dependence ([address] [facility])    -  Primary    Relevant Medications    buprenorphine-naloxone (Suboxone) 8-2 mg    Other Relevant Orders    [facility] Prescribed Meds and Special Instructions    Amphetamines, Methamphetamines    Butalbital    Phenobarbital    Secobarbital    Alprazolam    Clonazepam    Diazepam    Lorazepam    Gabapentin    Pregabalin    Cocaine    Heroin    Buprenorphine    Levorphanol    Meperidine    Naltrexone    Fentanyl    Methadone    Oxycodone    Tapentadol    THC    Tramadol    Codeine, Hydrocodone, Hydropmorphone, Morphine    Bath Salts    Ethyl Glucuronide/Ethyl Sulfate    Kratom    Spice    Methylphenidate    Phentermine    Validity Oxidant    Validity Creatinine    Validity pH    Validity Specific    Suboxone maintenance treatment complicating pregnancy, antepartum (HCC)        Relevant Medications    buprenorphine-naloxone (Suboxone) 8-2 mg    Other Relevant Orders    [facility] Prescribed Meds and Special Instructions    Amphetamines, Methamphetamines    Butalbital    Phenobarbital    Secobarbital    Alprazolam    Clonazepam    Diazepam    Lorazepam    Gabapentin    Pregabalin    Cocaine    Heroin    Buprenorphine    Levorphanol    Meperidine    Naltrexone    Fentanyl    Methadone    Oxycodone    Tapentadol    THC    Tramadol    Codeine, Hydrocodone, Hydropmorphone, Morphine    Bath Salts    Ethyl Glucuronide/Ethyl Sulfate    Kratom    Spice    Methylphenidate    Phentermine    Validity Oxidant    Validity Creatinine    Validity pH    Validity Specific    Encounter for monitoring Suboxone maintenance therapy        Relevant Medications    buprenorphine-naloxone (Suboxone) 8-2 mg    Other Relevant Orders    [facility] Prescribed Meds and Special Instructions    Amphetamines, Methamphetamines    Butalbital    Phenobarbital    Secobarbital    Alprazolam    Clonazepam    Diazepam    Lorazepam Gabapentin    Pregabalin    Cocaine    Heroin    Buprenorphine    Levorphanol    Meperidine    Naltrexone    Fentanyl    Methadone    Oxycodone    Tapentadol    THC    Tramadol    Codeine, Hydrocodone, Hydropmorphone, Morphine    Bath Salts    Ethyl Glucuronide/Ethyl Sulfate    Kratom    Spice    Methylphenidate    Phentermine    Validity Oxidant    Validity Creatinine    Validity pH    Validity Specific    Encounter for therapeutic drug level monitoring        Relevant Medications    buprenorphine-naloxone (Suboxone) 8-2 mg    Other Relevant Orders    [facility] Prescribed Meds and Special Instructions    Amphetamines, Methamphetamines    Butalbital    Phenobarbital    Secobarbital    Alprazolam    Clonazepam    Diazepam    Lorazepam    Gabapentin    Pregabalin    Cocaine    Heroin    Buprenorphine    Levorphanol    Meperidine    Naltrexone    Fentanyl    Methadone    Oxycodone    Tapentadol    THC    Tramadol    Codeine, Hydrocodone, Hydropmorphone, Morphine    Bath Salts    Ethyl Glucuronide/Ethyl Sulfate    Kratom    Spice    Methylphenidate    Phentermine    Validity Oxidant    Validity Creatinine    Validity pH    Validity Specific    Medication therapy continued        Relevant Medications    buprenorphine-naloxone (Suboxone) 8-2 mg    Other Relevant Orders    [facility] Prescribed Meds and Special Instructions    Amphetamines, Methamphetamines    Butalbital    Phenobarbital    Secobarbital    Alprazolam    Clonazepam    Diazepam    Lorazepam    Gabapentin    Pregabalin    Cocaine    Heroin    Buprenorphine    Levorphanol    Meperidine    Naltrexone    Fentanyl    Methadone    Oxycodone    Tapentadol    THC    Tramadol    Codeine, Hydrocodone, Hydropmorphone, Morphine    Bath Salts    Ethyl Glucuronide/Ethyl Sulfate    Kratom    Spice    Methylphenidate    Phentermine    Validity Oxidant    Validity Creatinine    Validity pH    Validity Specific    Gestational hypertension, antepartum        Relevant Medications    buprenorphine-naloxone (Suboxone) 8-2 mg    Other Relevant Orders    [facility] Prescribed Meds and Special Instructions    Amphetamines, Methamphetamines    Butalbital    Phenobarbital    Secobarbital    Alprazolam    Clonazepam    Diazepam    Lorazepam    Gabapentin    Pregabalin    Cocaine    Heroin    Buprenorphine    Levorphanol    Meperidine    Naltrexone    Fentanyl    Methadone    Oxycodone    Tapentadol    THC    Tramadol    Codeine, Hydrocodone, Hydropmorphone, Morphine    Bath Salts    Ethyl Glucuronide/Ethyl Sulfate    Kratom    Spice    Methylphenidate    Phentermine    Validity Oxidant    Validity Creatinine    Validity pH    Validity Specific    Obesity affecting pregnancy in third trimester        Relevant Medications    buprenorphine-naloxone (Suboxone) 8-2 mg    Other Relevant Orders    [facility] Prescribed Meds and Special Instructions    Amphetamines, Methamphetamines    Butalbital    Phenobarbital    Secobarbital    Alprazolam    Clonazepam    Diazepam    Lorazepam    Gabapentin    Pregabalin    Cocaine    Heroin    Buprenorphine    Levorphanol    Meperidine    Naltrexone    Fentanyl    Methadone    Oxycodone    Tapentadol    THC    Tramadol    Codeine, Hydrocodone, Hydropmorphone, Morphine    Bath Salts    Ethyl Glucuronide/Ethyl Sulfate    Kratom    Spice    Methylphenidate    Phentermine    Validity Oxidant    Validity Creatinine    Validity pH    Validity Specific           Diagnoses and all orders for this visit:    Drug dependence (HCC)  -     buprenorphine-naloxone (Suboxone) 8-2 mg; One or two strips sl q day.   -     [facility] Prescribed Meds and Special Instructions  -     Amphetamines, Methamphetamines  -     Butalbital  -     Phenobarbital  -     Secobarbital  -     Alprazolam  -     Clonazepam  -     Diazepam  -     Lorazepam  -     Gabapentin  -     Pregabalin  -     Cocaine  -     Heroin  -     Buprenorphine  -     Levorphanol  -     Meperidine  - Naltrexone  -     Fentanyl  -     Methadone  -     Oxycodone  -     Tapentadol  -     THC  -     Tramadol  -     Codeine, Hydrocodone, Hydropmorphone, Morphine  -     Bath Salts  -     Ethyl Glucuronide/Ethyl Sulfate  -     Kratom  -     Spice  -     Methylphenidate  -     Phentermine  -     Validity Oxidant  -     Validity Creatinine  -     Validity pH  -     Validity Specific    Suboxone maintenance treatment complicating pregnancy, antepartum (HCC)  -     buprenorphine-naloxone (Suboxone) 8-2 mg; One or two strips sl q day. -     [facility] Med[facility] Special Instructions  -     Amphetamines, Methamphetamines  -     Butalbital  -     Phenobarbital  -     Secobarbital  -     Alprazolam  -     Clonazepam  -     Diazepam  -     Lorazepam  -     Gabapentin  -     Pregabalin  -     Cocaine  -     Heroin  -     Buprenorphine  -     Levorphanol  -     Meperidine  -     Naltrexone  -     Fentanyl  -     Methadone  -     Oxycodone  -     Tapentadol  -     THC  -     Tramadol  -     Codeine, Hydrocodone, Hydropmorphone, Morphine  -     Bath Salts  -     Ethyl Glucuronide/Ethyl Sulfate  -     Kratom  -     Spice  -     Methylphenidate  -     Phentermine  -     Validity Oxidant  -     Validity Creatinine  -     Validity pH  -     Validity Specific    Encounter for monitoring Suboxone maintenance therapy  -     buprenorphine-naloxone (Suboxone) 8-2 mg; One or two strips sl q day.   -     [facility] All Prescribed Meds and Special Instructions  -     Amphetamines, Methamphetamines  -     Butalbital  -     Phenobarbital  -     Secobarbital  -     Alprazolam  -     Clonazepam  -     Diazepam  -     Lorazepam  -     Gabapentin  -     Pregabalin  -     Cocaine  -     Heroin  -     Buprenorphine  -     Levorphanol  -     Meperidine  -     Naltrexone  -     Fentanyl  -     Methadone  -     Oxycodone  -     Tapentadol  -     THC  -     Tramadol  -     Codeine, Hydrocodone, Hydropmorphone, Morphine  -     Bath Salts  - Ethyl Glucuronide/Ethyl Sulfate  -     Kratom  -     Spice  -     Methylphenidate  -     Phentermine  -     Validity Oxidant  -     Validity Creatinine  -     Validity pH  -     Validity Specific    Encounter for therapeutic drug level monitoring  -     buprenorphine-naloxone (Suboxone) 8-2 mg; One or two strips sl q day. -     [facility] Prescribed Med[facility] and Special Instructions  -     Amphetamines, Methamphetamines  -     Butalbital  -     Phenobarbital  -     Secobarbital  -     Alprazolam  -     Clonazepam  -     Diazepam  -     Lorazepam  -     Gabapentin  -     Pregabalin  -     Cocaine  -     Heroin  -     Buprenorphine  -     Levorphanol  -     Meperidine  -     Naltrexone  -     Fentanyl  -     Methadone  -     Oxycodone  -     Tapentadol  -     THC  -     Tramadol  -     Codeine, Hydrocodone, Hydropmorphone, Morphine  -     Bath Salts  -     Ethyl Glucuronide/Ethyl Sulfate  -     Kratom  -     Spice  -     Methylphenidate  -     Phentermine  -     Validity Oxidant  -     Validity Creatinine  -     Validity pH  -     Validity Specific    Medication therapy continued  -     buprenorphine-naloxone (Suboxone) 8-2 mg; One or two strips sl q day.   -     [facility] All Prescribed Meds and Special Instructions  -     Amphetamines, Methamphetamines  -     Butalbital  -     Phenobarbital  -     Secobarbital  -     Alprazolam  -     Clonazepam  -     Diazepam  -     Lorazepam  -     Gabapentin  -     Pregabalin  -     Cocaine  -     Heroin  -     Buprenorphine  -     Levorphanol  -     Meperidine  -     Naltrexone  -     Fentanyl  -     Methadone  -     Oxycodone  -     Tapentadol  -     THC  -     Tramadol  -     Codeine, Hydrocodone, Hydropmorphone, Morphine  -     Bath Salts  -     Ethyl Glucuronide/Ethyl Sulfate  -     Kratom  -     Spice  -     Methylphenidate  -     Phentermine  -     Validity Oxidant  -     Validity Creatinine  -     Validity pH  -     Validity Specific    Gestational hypertension, antepartum  -     buprenorphine-naloxone (Suboxone) 8-2 mg; One or two strips sl q day. -     [facility] All Prescribed Meds and Special Instructions  -     Amphetamines, Methamphetamines  -     Butalbital  -     Phenobarbital  -     Secobarbital  -     Alprazolam  -     Clonazepam  -     Diazepam  -     Lorazepam  -     Gabapentin  -     Pregabalin  -     Cocaine  -     Heroin  -     Buprenorphine  -     Levorphanol  -     Meperidine  -     Naltrexone  -     Fentanyl  -     Methadone  -     Oxycodone  -     Tapentadol  -     THC  -     Tramadol  -     Codeine, Hydrocodone, Hydropmorphone, Morphine  -     Bath Salts  -     Ethyl Glucuronide/Ethyl Sulfate  -     Kratom  -     Spice  -     Methylphenidate  -     Phentermine  -     Validity Oxidant  -     Validity Creatinine  -     Validity pH  -     Validity Specific    Obesity affecting pregnancy in third trimester  -     buprenorphine-naloxone (Suboxone) 8-2 mg; One or two strips sl q day. -     [facility] All Prescribed Meds and Special Instructions  -     Amphetamines, Methamphetamines  -     Butalbital  -     Phenobarbital  -     Secobarbital  -     Alprazolam  -     Clonazepam  -     Diazepam  -     Lorazepam  -     Gabapentin  -     Pregabalin  -     Cocaine  -     Heroin  -     Buprenorphine  -     Levorphanol  -     Meperidine  -     Naltrexone  -     Fentanyl  -     Methadone  -     Oxycodone  -     Tapentadol  -     THC  -     Tramadol  -     Codeine, Hydrocodone, Hydropmorphone, Morphine  -     Bath Salts  -     Ethyl Glucuronide/Ethyl Sulfate  -     Kratom  -     Spice  -     Methylphenidate  -     Phentermine  -     Validity Oxidant  -     Validity Creatinine  -     Validity pH  -     Validity Specific        No problem-specific Assessment & Plan notes found for this encounter. Scheduled Medication Review:  Pt's scheduled medication use was reviewed by myself/staff via the [employer] website.  Pt's use has been found to be appropriate w/o any concerns for misuse by the patient. Pt's current conditions require continued scheduled medication use at this time. Future review for continued appropriate medication use and misuse will continue. A/P: Doing well and will continue 16mg films, dose 3/6 and f/u for  counseling. Reminded to keep meds safe and out of reach of children. RTC 4 weeks. Subjective: WF presents for f/u suboxone. Doing well and no c/o's. Not using and no withdraw. Doesn't attend counseling, but has an appt. UDT obtained today. Tolerating the meds and no side effects. Patient ID: Jeanine Garcia is a 32 y.o. female. HPI    The following portions of the patient's history were reviewed and updated as appropriate:   She has a past medical history of Abnormal Pap smear of cervix, Asthma, Depression, and Preeclampsia (2016). ,  does not have any pertinent problems on file. ,   has a past surgical history that includes Gynecologic cryosurgery (2016). ,  family history includes Hypertension in her father. ,   reports that she has been smoking cigarettes. She has a 3.75 pack-year smoking history. She has never been exposed to tobacco smoke. She has never used smokeless tobacco. She reports that she does not currently use alcohol. She reports that she does not currently use drugs after having used the following drugs: Heroin, Methamphetamines, and Fentanyl.,  has No Known Allergies. .  Current Outpatient Medications   Medication Sig Dispense Refill   • buprenorphine-naloxone (Suboxone) 8-2 mg One or two strips sl q day. 60 Film 0   • naloxone (NARCAN) 4 mg/0.1 mL nasal spray Administer 1 spray into a nostril. If no response after 2-3 minutes, give another dose in the other nostril using a new spray. 1 each 0   • naloxone (Narcan) 4 mg/0.1 mL nasal spray 0.1 mL (4 mg total) into each nostril as needed (respiratory depression or possible OD) 1 each 1     No current facility-administered medications for this visit.        Review of Systems Constitutional: Negative for activity change, chills, diaphoresis, fatigue and fever. Respiratory: Negative for cough, chest tightness, shortness of breath and wheezing. Cardiovascular: Negative for chest pain, palpitations and leg swelling. Gastrointestinal: Negative for abdominal pain, constipation, diarrhea, nausea and vomiting. Genitourinary: Negative for difficulty urinating, dysuria and frequency. Musculoskeletal: Negative for arthralgias, gait problem and myalgias. Neurological: Negative for dizziness, seizures, syncope, weakness, light-headedness and headaches. Psychiatric/Behavioral: Negative for confusion, dysphoric mood and sleep disturbance. The patient is not nervous/anxious. PHQ-2/9 Depression Screening          Objective:  Vitals:    07/06/23 1536   BP: 130/80   Pulse: 80   Temp: 99 °F (37.2 °C)   SpO2: 98%   Weight: 68.5 kg (151 lb)   Height: 5' 1" (1.549 m)     Body mass index is 28.53 kg/m². Physical Exam  Vitals and nursing note reviewed. Constitutional:       General: She is not in acute distress. Appearance: Normal appearance. She is not ill-appearing. HENT:      Head: Normocephalic and atraumatic. Mouth/Throat:      Mouth: Mucous membranes are moist.   Eyes:      Extraocular Movements: Extraocular movements intact. Conjunctiva/sclera: Conjunctivae normal.      Pupils: Pupils are equal, round, and reactive to light. Cardiovascular:      Rate and Rhythm: Normal rate and regular rhythm. Heart sounds: Normal heart sounds. No murmur heard. Pulmonary:      Effort: Pulmonary effort is normal. No respiratory distress. Breath sounds: Normal breath sounds. No wheezing, rhonchi or rales. Abdominal:      General: Bowel sounds are normal. There is no distension. Palpations: Abdomen is soft. Tenderness: There is no abdominal tenderness. Neurological:      General: No focal deficit present.       Mental Status: She is alert and oriented to person, place, and time. Mental status is at baseline. Psychiatric:         Mood and Affect: Mood normal.         Behavior: Behavior normal.         Thought Content:  Thought content normal.         Judgment: Judgment normal.

## 2023-07-06 NOTE — PATIENT INSTRUCTIONS
Buprenorphine/Naloxone (Into the mouth)   Buprenorphine (bue-pre-NOR-feen), Naloxone (nal-OX-one)  Treats narcotic dependence. Brand Name(s): Suboxone, Zubsolv   There may be other brand names for this medicine. When This Medicine Should Not Be Used: This medicine is not right for everyone. Do not use it if you had an allergic reaction to buprenorphine or naloxone. How to Use This Medicine: Thin Sheet, Tablet  Take your medicine as directed. Your dose may need to be changed several times to find what works best for you. You must let the medicine dissolve. Never swallow the film or tablet. Your body may not absorb enough of the medicine if you swallow it. Your health caregiver should show you how to use the medicine. If you do not understand, ask for help. It is important to use the medicine correctly. Do not talk while the medicine is inside your mouth. Buccal film: Rinse your mouth with water to moisten it. Place the film against the inside of your cheek. If your doctor told you to use more than 1 film, place the second film inside your other cheek. Do not place more than 2 films inside of 1 cheek at a time. Do not move or touch the film. Do not eat or drink anything until the film is completely dissolved. Sublingual tablet: Place the tablet under your tongue. If your doctor told you to use more than 1 tablet, place all of the tablets in different places under your tongue at the same time. You can use 2 tablets at a time until you have taken all of the medicine, if that is easier for you. Let the tablets dissolve completely in your mouth. Do not eat or drink anything until the tablets are completely dissolved. Rinse your mouth with water and swallow. Wait for at least one hour before brushing your teeth. Sublingual film: Drink some water to help moisten your mouth. Place the film under your tongue.  If your doctor told you to use more than 1 film, place the second film on the opposite side from the first one. Do not move the film after you placed it under your tongue. If you are supposed to use more than 2 films, use them the same way, but do not start until the first 2 films are completely dissolved. Do not eat or drink anything until the film is completely dissolved. Do not break, crush, chew, or cut the film or tablet. This medicine should come with a Medication Guide. Ask your pharmacist for a copy if you do not have one. Missed dose: Take a dose as soon as you remember. If it is almost time for your next dose, wait until then and take a regular dose. Do not take extra medicine to make up for a missed dose. Store the medicine in a closed container at room temperature, away from heat, moisture, and direct light. Drop off any unused narcotic medicine at a drug take-back location right away. If you do not have a drug take-back location near you, flush any unused narcotic medicine down the toilet. Check your local drug store and clinics for take-back locations. You can also check the [employer] web site for locations. Here is the link to the FDA safe disposal of medicines [employer]ee  Drugs and Foods to Avoid:   Ask your doctor or pharmacist before using any other medicine, including over-the-counter medicines, vitamins, and herbal products. Do not use this medicine if you are using or have used an MAO inhibitor within the past 14 days. Some medicines can affect how buprenorphine/naloxone works.  Tell your doctor if you are using the following:   Carbamazepine, cyclobenzaprine, erythromycin, ketoconazole, metaxalone, mirtazapine, phenobarbital, phenytoin, rifampin, tramadol, trazodone  Diuretic (water pill)  Medicine to treat depression or mental health problems (including SNRIs, SSRIs, TCAs)  Medicine to treat HIV/AIDS (including atazanavir, delavirdine, efavirenz, etravirine, nevirapine, ritonavir)  Phenothiazine medicine  Triptan medicine to treat migraine headaches  Do not drink alcohol while you are using this medicine. Tell your doctor if you use anything else that makes you sleepy. Some examples are allergy medicine, narcotic pain medicine, and alcohol. Tell your doctor if you are also using butorphanol, nalbuphine, pentazocine, or a muscle relaxer. Warnings While Using This Medicine:   Tell your doctor if you are pregnant or breastfeeding, or if you have kidney disease, liver disease (including hepatitis), lung or breathing problems (including sleep apnea), tooth problems (including history of cavities), adrenal gland problems, an enlarged prostate, trouble urinating, gallbladder problems, thyroid problems, stomach problems, or a history of depression. Tell your doctor if you have heart disease, congestive heart failure, a slow heartbeat, or a history of heart rhythm problems (including long QT syndrome). Tell your doctor if you have a brain tumor, head injury, or alcohol or drug abuse. This medicine may cause the following problems:  High risk of overdose, which can lead to death  Respiratory depression (serious breathing problem that can be life-threatening)  Adrenal gland problems  Sleep-related breathing problems (including sleep apnea, sleep-related hypoxemia)  Low blood pressure  Liver problems  QT prolongation (heart rhythm problem)  Tooth problems (including tooth fracture, tooth loss)  Serotonin syndrome, when used with certain medicines  This medicine may make you dizzy or drowsy. Do not drive or do anything else that could be dangerous until you know how this medicine affects you. Sit or lie down if you feel dizzy. Stand up carefully. Tell any doctor or dentist who treats you that you are using this medicine. This medicine can be habit-forming. Do not use more than your prescribed dose. Call your doctor if you think your medicine is not working.   This medicine may cause constipation, especially with long-term use. Ask your doctor if you should use a laxative to prevent and treat constipation. Do not stop using this medicine suddenly. Your doctor will need to slowly decrease your dose before you stop it completely. This medicine could cause infertility. Talk with your doctor before using this medicine if you plan to have children. Your doctor will do lab tests at regular visits to check on the effects of this medicine. Keep all appointments. Keep all medicine out of the reach of children. Never share your medicine with anyone. Possible Side Effects While Using This Medicine:   Call your doctor right away if you notice any of these side effects: Allergic reaction: Itching or hives, swelling in your face or hands, swelling or tingling in your mouth or throat, chest tightness, trouble breathing  Anxiety, restlessness, fast heartbeat, fever, muscle spasms, twitching, diarrhea, seeing or hearing things that are not there  Blue lips, fingernails, or skin, trouble breathing  Changes in skin color, dark freckles, cold feeling, tiredness, weight loss  Dark urine or pale stools, nausea, vomiting, loss of appetite, stomach pain, yellow skin or eyes  Extreme dizziness, drowsiness, or weakness, slow heartbeat, sweating, seizures, cold or clammy skin  Fast, pounding, or uneven heartbeat  Severe confusion, lightheadedness, dizziness, or fainting  Trouble breathing or slow breathing  Toothache  If you notice these less serious side effects, talk with your doctor:   Constipation, diarrhea, nausea, vomiting, stomach upset  Headache  Stuffy or runny nose  If you notice other side effects that you think are caused by this medicine, tell your doctor. Call your doctor for medical advice about side effects.  You may report side effects to FDA at 7-338-FDA-6947  © Copyright [employer] 2022 Information is for End User's use only and may not be sold, redistributed or otherwise used for commercial purposes. The above information is an  only. It is not intended as medical advice for individual conditions or treatments. Talk to your doctor, nurse or pharmacist before following any medical regimen to see if it is safe and effective for you.

## 2023-09-15 ENCOUNTER — APPOINTMENT (OUTPATIENT)
Dept: LAB | Facility: CLINIC | Age: 28
End: 2023-09-15
Payer: COMMERCIAL

## 2023-09-15 DIAGNOSIS — F11.20 OPIOID TYPE DEPENDENCE, CONTINUOUS (HCC): ICD-10-CM

## 2023-09-15 PROCEDURE — 36415 COLL VENOUS BLD VENIPUNCTURE: CPT

## 2023-09-15 PROCEDURE — 86706 HEP B SURFACE ANTIBODY: CPT

## 2023-09-17 LAB — HBV SURFACE AB SER-ACNC: 101 MIU/ML

## 2023-10-04 ENCOUNTER — OFFICE VISIT (OUTPATIENT)
Dept: INTERNAL MEDICINE CLINIC | Facility: CLINIC | Age: 28
End: 2023-10-04
Payer: COMMERCIAL

## 2023-10-04 VITALS
BODY MASS INDEX: 34.41 KG/M2 | SYSTOLIC BLOOD PRESSURE: 128 MMHG | DIASTOLIC BLOOD PRESSURE: 76 MMHG | TEMPERATURE: 98.7 F | HEART RATE: 84 BPM | OXYGEN SATURATION: 98 % | HEIGHT: 62 IN | WEIGHT: 187 LBS

## 2023-10-04 DIAGNOSIS — Z00.00 WELL ADULT EXAM: Primary | ICD-10-CM

## 2023-10-04 DIAGNOSIS — Z23 ENCOUNTER FOR VACCINATION: ICD-10-CM

## 2023-10-04 DIAGNOSIS — R76.8 HEPATITIS C ANTIBODY TEST POSITIVE: ICD-10-CM

## 2023-10-04 DIAGNOSIS — Z13.0 SCREENING FOR DEFICIENCY ANEMIA: ICD-10-CM

## 2023-10-04 DIAGNOSIS — Z13.29 SCREENING FOR THYROID DISORDER: ICD-10-CM

## 2023-10-04 DIAGNOSIS — Z13.1 SCREENING FOR DIABETES MELLITUS (DM): ICD-10-CM

## 2023-10-04 DIAGNOSIS — F19.20 DRUG DEPENDENCE (HCC): ICD-10-CM

## 2023-10-04 DIAGNOSIS — Z72.0 TOBACCO ABUSE: ICD-10-CM

## 2023-10-04 DIAGNOSIS — Z02.89 ENCOUNTER FOR COMPLETION OF FORM WITH PATIENT: ICD-10-CM

## 2023-10-04 DIAGNOSIS — E66.9 OBESITY (BMI 30.0-34.9): ICD-10-CM

## 2023-10-04 DIAGNOSIS — Z13.220 SCREENING FOR LIPID DISORDERS: ICD-10-CM

## 2023-10-04 DIAGNOSIS — Z87.59 HISTORY OF GESTATIONAL HYPERTENSION: ICD-10-CM

## 2023-10-04 DIAGNOSIS — Z86.32 HISTORY OF GESTATIONAL DIABETES: ICD-10-CM

## 2023-10-04 DIAGNOSIS — Z02.4 DRIVER'S PERMIT PE (PHYSICAL EXAMINATION): ICD-10-CM

## 2023-10-04 PROBLEM — E66.811 OBESITY (BMI 30.0-34.9): Status: ACTIVE | Noted: 2023-10-04

## 2023-10-04 PROCEDURE — 99395 PREV VISIT EST AGE 18-39: CPT | Performed by: INTERNAL MEDICINE

## 2023-10-04 RX ORDER — BUPRENORPHINE 300 MG/1
SOLUTION SUBCUTANEOUS
COMMUNITY
Start: 2023-09-22

## 2023-10-04 NOTE — PROGRESS NOTES
Assessment/Plan:  Problem List Items Addressed This Visit        Other    Tobacco abuse    Obesity (BMI 30.0-34. 9)    History of gestational hypertension    History of gestational diabetes    Relevant Orders    Hemoglobin A1C    Hepatitis C antibody test positive    Relevant Orders    CBC and differential    Comprehensive metabolic panel   Other Visit Diagnoses     Well adult exam    -  Primary    Relevant Orders    CBC and differential    Comprehensive metabolic panel    Hemoglobin A1C    Lipid Panel with Direct LDL reflex    TSH, 3rd generation    Encounter for completion of form with patient        's permit PE (physical examination)        Drug dependence ([address])        Screening for lipid disorders        Relevant Orders    Lipid Panel with Direct LDL reflex    Screening for diabetes mellitus (DM)        Relevant Orders    Hemoglobin A1C    Screening for thyroid disorder        Relevant Orders    TSH, 3rd generation    Screening for deficiency anemia        Encounter for vaccination               Diagnoses and all orders for this visit:    Well adult exam  -     CBC and differential; Future  -     Comprehensive metabolic panel; Future  -     Hemoglobin A1C; Future  -     Lipid Panel with Direct LDL reflex; Future  -     TSH, 3rd generation; Future    Encounter for completion of form with patient    's permit PE (physical examination)    Tobacco abuse    Drug dependence ([address])    Obesity (BMI 30.0-34. 9)    History of gestational hypertension    History of gestational diabetes  -     Hemoglobin A1C; Future    Hepatitis C antibody test positive  -     CBC and differential; Future  -     Comprehensive metabolic panel; Future    Screening for lipid disorders  -     Lipid Panel with Direct LDL reflex; Future    Screening for diabetes mellitus (DM)  -     Hemoglobin A1C; Future    Screening for thyroid disorder  -     TSH, 3rd generation;  Future    Screening for deficiency anemia    Encounter for vaccination    Other orders  -     Sublocade 300 MG/1.5ML        No problem-specific Assessment & Plan notes found for this encounter. A/P: Doing well and co-morbidties are controlled. No mental or physical restrictions. No evidence of communicable diseases at this time. Pt to f/u for Hep C treatment and MAT program. No restrictions for driving as long as the pt remains in the MAT program. Will check routine labs. Needs to get into see and eye doc and a DDS. Start SBE. Discussed vaccines and defers. Discussed BMI and will give info on diet and exercise. BMI Counseling: Body mass index is 34.76 kg/m². The BMI is above normal. Nutrition recommendations include decreasing portion sizes and encouraging healthy choices of fruits and vegetables. Exercise recommendations include moderate physical activity 150 minutes/week. No pharmacotherapy was ordered. Rationale for BMI follow-up plan is due to patient being overweight or obese. Depression Screening and Follow-up Plan: Patient was screened for depression during today's encounter. They screened negative with a PHQ-2 score of 0. Papers filled out. RTC as scheduled. Subjective:      Patient ID: Doug Rodriguez is a 25 y.o. female. WF presents for a well exam and  permit PE. Doing well and no c/o's. S/p recent IUP with a healthy child. Pregnancy complicated by gestational dm and pre eclampsia. Remains active w/o difficulty and no falls. No recent illnesses. No fever, chills, or sweats. No unexplained wt change. No travel. No depression, suicidal ideations, or violent ideations. No trouble performing her work responsibilities and no work related injuries. 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 Pt remains off illicit drugs and is in a MAT program and receiving sublocade. H/o hepatitis and is to start treatment at the MAT program soon. No CP, SOB, palpitations, edema, lightheadedness, orthopnea, or PND. No changes in bowel or bladder habits. Denies current drug use. Admits to smoking. Denies ETOH. No problems with vision or hearing. BP has been controlled. No change in PMH, PSH, All, OH, SH, or FHx. The following portions of the patient's history were reviewed and updated as appropriate:   She has a past medical history of Abnormal Pap smear of cervix, Asthma, Depression, and Preeclampsia (2016). ,  does not have any pertinent problems on file. ,   has a past surgical history that includes Gynecologic cryosurgery (2016). ,  family history includes Hypertension in her father. ,   reports that she has been smoking cigarettes. She has a 3.75 pack-year smoking history. She has never been exposed to tobacco smoke. She has never used smokeless tobacco. She reports that she does not currently use alcohol. She reports that she does not currently use drugs after having used the following drugs: Heroin, Methamphetamines, and Fentanyl.,  has No Known Allergies. .  Current Outpatient Medications   Medication Sig Dispense Refill   • naloxone (NARCAN) 4 mg/0.1 mL nasal spray Administer 1 spray into a nostril. If no response after 2-3 minutes, give another dose in the other nostril using a new spray. 1 each 0   • Sublocade 300 MG/1.5ML        No current facility-administered medications for this visit. Review of Systems   Constitutional: Negative for activity change, chills, diaphoresis, fatigue and fever. HENT: Negative. Eyes: Negative for visual disturbance. Respiratory: Negative for cough, chest tightness, shortness of breath and wheezing. Cardiovascular: Negative for chest pain, palpitations and leg swelling. Gastrointestinal: Negative for abdominal pain, constipation, diarrhea, nausea and vomiting. Endocrine: Negative for cold intolerance and heat intolerance. Genitourinary: Negative for difficulty urinating, dysuria and frequency. Musculoskeletal: Negative for arthralgias, gait problem and myalgias.    Neurological: Negative for dizziness, seizures, syncope, weakness, light-headedness and headaches. Psychiatric/Behavioral: Negative for confusion, dysphoric mood and sleep disturbance. The patient is not nervous/anxious. PHQ-2/9 Depression Screening    Little interest or pleasure in doing things: 0 - not at all  Feeling down, depressed, or hopeless: 0 - not at all  PHQ-2 Score: 0  PHQ-2 Interpretation: Negative depression screen        Objective:  Vitals:    10/04/23 1457   BP: 128/76   Pulse: 84   Temp: 98.7 °F (37.1 °C)   SpO2: 98%   Weight: 84.8 kg (187 lb)   Height: 5' 1.5" (1.562 m)     Body mass index is 34.76 kg/m². Physical Exam  Vitals and nursing note reviewed. Constitutional:       General: She is not in acute distress. Appearance: Normal appearance. She is not ill-appearing. HENT:      Head: Normocephalic and atraumatic. Mouth/Throat:      Mouth: Mucous membranes are moist.   Eyes:      Extraocular Movements: Extraocular movements intact. Conjunctiva/sclera: Conjunctivae normal.      Pupils: Pupils are equal, round, and reactive to light. Neck:      Vascular: No carotid bruit. Cardiovascular:      Rate and Rhythm: Normal rate and regular rhythm. Heart sounds: Normal heart sounds. No murmur heard. Pulmonary:      Effort: Pulmonary effort is normal. No respiratory distress. Breath sounds: Normal breath sounds. No wheezing, rhonchi or rales. Abdominal:      General: Bowel sounds are normal. There is no distension. Palpations: Abdomen is soft. Tenderness: There is no abdominal tenderness. Musculoskeletal:         General: No swelling, tenderness or deformity. Normal range of motion. Cervical back: Normal range of motion and neck supple. No rigidity or tenderness. Right lower leg: No edema. Left lower leg: No edema. Lymphadenopathy:      Cervical: No cervical adenopathy. Neurological:      General: No focal deficit present.       Mental Status: She is alert and oriented to person, place, and time. Mental status is at baseline. Cranial Nerves: No cranial nerve deficit. Motor: No weakness. Coordination: Coordination normal.      Gait: Gait normal.      Deep Tendon Reflexes: Reflexes normal.   Psychiatric:         Mood and Affect: Mood normal.         Behavior: Behavior normal.         Thought Content:  Thought content normal.         Judgment: Judgment normal.

## 2023-12-05 ENCOUNTER — TELEPHONE (OUTPATIENT)
Dept: OBGYN CLINIC | Facility: CLINIC | Age: 28
End: 2023-12-05

## 2024-04-29 ENCOUNTER — TELEPHONE (OUTPATIENT)
Dept: OBGYN CLINIC | Facility: CLINIC | Age: 29
End: 2024-04-29

## 2024-06-12 ENCOUNTER — OFFICE VISIT (OUTPATIENT)
Dept: OBGYN CLINIC | Facility: CLINIC | Age: 29
End: 2024-06-12

## 2024-06-12 VITALS
HEIGHT: 62 IN | SYSTOLIC BLOOD PRESSURE: 121 MMHG | HEART RATE: 59 BPM | BODY MASS INDEX: 32.02 KG/M2 | WEIGHT: 174 LBS | DIASTOLIC BLOOD PRESSURE: 68 MMHG

## 2024-06-12 DIAGNOSIS — Z87.59: Primary | ICD-10-CM

## 2024-06-12 LAB — SL AMB POCT URINE HCG: NEGATIVE

## 2024-06-12 PROCEDURE — 99213 OFFICE O/P EST LOW 20 MIN: CPT | Performed by: OBSTETRICS & GYNECOLOGY

## 2024-06-12 PROCEDURE — 81025 URINE PREGNANCY TEST: CPT | Performed by: OBSTETRICS & GYNECOLOGY

## 2024-06-12 NOTE — PROGRESS NOTES
"Subjective:     Ramona Gallagher is a 28 y.o.  female who presents after medical  on  at [facility]. She bled for 3 weeks after the  and then the bleeding stopped. She was supposed to follow up with [facility] but did not. She had a period 24. She is interested in birth control, the Paragard IUD in particular.    Objective:    Vitals: Blood pressure 121/68, pulse 59, height 5' 1.5\" (1.562 m), weight 78.9 kg (174 lb), last menstrual period 2024, not currently breastfeeding.Body mass index is 32.34 kg/m².    Physical Exam  Constitutional:       Appearance: She is well-developed.   Cardiovascular:      Rate and Rhythm: Normal rate and regular rhythm.      Heart sounds: Normal heart sounds. No murmur heard.     No friction rub. No gallop.   Pulmonary:      Effort: Pulmonary effort is normal. No respiratory distress.      Breath sounds: No wheezing.   Abdominal:      Palpations: Abdomen is soft.      Tenderness: There is no abdominal tenderness.   Musculoskeletal:         General: No tenderness.   Neurological:      Mental Status: She is alert and oriented to person, place, and time.   Vitals reviewed.         Assessment/Plan:     follow-up  -Pregnancy testing negative currently  -Will return for IUD insertion        Lance Tariq MD  2024  4:04 PM        "

## 2024-07-11 ENCOUNTER — PROCEDURE VISIT (OUTPATIENT)
Dept: OBGYN CLINIC | Facility: CLINIC | Age: 29
End: 2024-07-11

## 2024-07-11 VITALS
WEIGHT: 167.4 LBS | HEART RATE: 65 BPM | HEIGHT: 61 IN | DIASTOLIC BLOOD PRESSURE: 76 MMHG | SYSTOLIC BLOOD PRESSURE: 126 MMHG | BODY MASS INDEX: 31.6 KG/M2

## 2024-07-11 DIAGNOSIS — Z30.430 ENCOUNTER FOR INSERTION OF PARAGARD IUD: Primary | ICD-10-CM

## 2024-07-11 LAB — SL AMB POCT URINE HCG: NEGATIVE

## 2024-07-11 PROCEDURE — 58300 INSERT INTRAUTERINE DEVICE: CPT | Performed by: OBSTETRICS & GYNECOLOGY

## 2024-07-11 PROCEDURE — 81025 URINE PREGNANCY TEST: CPT | Performed by: OBSTETRICS & GYNECOLOGY

## 2024-07-11 RX ORDER — COPPER 313.4 MG/1
INTRAUTERINE DEVICE INTRAUTERINE
Status: COMPLETED | OUTPATIENT
Start: 2024-07-11 | End: 2024-07-11

## 2024-07-11 RX ORDER — COPPER 313.4 MG/1
1 INTRAUTERINE DEVICE INTRAUTERINE
OUTPATIENT
Start: 2024-07-11

## 2024-07-11 RX ADMIN — COPPER: 313.4 INTRAUTERINE DEVICE INTRAUTERINE at 09:00

## 2024-07-11 NOTE — PROGRESS NOTES
Iud insertions    Performed by: Satish Molina MD  Authorized by: Satish Molina MD    Procedure: IUD insertion    Consent obtained by patient, parent, or legal power of  - including discussion of procedure risks and benefits, patient questions answered, and patient education provided.: yes    Date/Time of Insertion:  7/11/2024 9:55 AM  Pelvic exam performed: yes    Speculum placed in vagina: yes    Cervix cleaned and prepped: yes    Tenaculum/Allis/Ring Forceps applied to cervix: no    Anesthesia used: no    Uterus sound depth (cm):  7  Cervix dilated: no    IUD inserted without complications: yes    IUD type:  Intrauterine copper  Strings trimmed to (cm):  3  Patient tolerated procedure well: yes    Inserted with ultrasound guidance: no

## 2024-07-22 ENCOUNTER — VBI (OUTPATIENT)
Dept: ADMINISTRATIVE | Facility: OTHER | Age: 29
End: 2024-07-22

## 2024-07-22 NOTE — TELEPHONE ENCOUNTER
07/22/24 8:21 AM     Chart reviewed for Pap Smear (HPV) aka Cervical Cancer Screening was/were submitted to the patient's insurance.     Kimi Crouch   [facility]  
no CP, no SOB

## 2024-08-19 ENCOUNTER — TELEPHONE (OUTPATIENT)
Dept: OBGYN CLINIC | Facility: CLINIC | Age: 29
End: 2024-08-19

## 2024-09-04 ENCOUNTER — TELEPHONE (OUTPATIENT)
Dept: OBGYN CLINIC | Facility: CLINIC | Age: 29
End: 2024-09-04

## 2025-01-08 ENCOUNTER — APPOINTMENT (OUTPATIENT)
Dept: LAB | Facility: CLINIC | Age: 30
End: 2025-01-08
Payer: COMMERCIAL

## 2025-01-08 ENCOUNTER — OFFICE VISIT (OUTPATIENT)
Dept: INTERNAL MEDICINE CLINIC | Facility: CLINIC | Age: 30
End: 2025-01-08
Payer: COMMERCIAL

## 2025-01-08 VITALS
BODY MASS INDEX: 36.06 KG/M2 | HEIGHT: 61 IN | HEART RATE: 77 BPM | WEIGHT: 191 LBS | OXYGEN SATURATION: 97 % | TEMPERATURE: 97.9 F | SYSTOLIC BLOOD PRESSURE: 124 MMHG | DIASTOLIC BLOOD PRESSURE: 80 MMHG

## 2025-01-08 DIAGNOSIS — Z13.1 SCREENING FOR DIABETES MELLITUS: ICD-10-CM

## 2025-01-08 DIAGNOSIS — M79.601 PARESTHESIA AND PAIN OF BOTH UPPER EXTREMITIES: ICD-10-CM

## 2025-01-08 DIAGNOSIS — Z00.00 WELL ADULT EXAM: ICD-10-CM

## 2025-01-08 DIAGNOSIS — Z13.29 SCREENING FOR THYROID DISORDER: ICD-10-CM

## 2025-01-08 DIAGNOSIS — Z13.220 SCREENING FOR HYPERLIPIDEMIA: ICD-10-CM

## 2025-01-08 DIAGNOSIS — Z23 ENCOUNTER FOR IMMUNIZATION: ICD-10-CM

## 2025-01-08 DIAGNOSIS — Z13.0 SCREENING FOR DEFICIENCY ANEMIA: ICD-10-CM

## 2025-01-08 DIAGNOSIS — Z00.00 WELL ADULT EXAM: Primary | ICD-10-CM

## 2025-01-08 DIAGNOSIS — M79.602 PARESTHESIA AND PAIN OF BOTH UPPER EXTREMITIES: ICD-10-CM

## 2025-01-08 DIAGNOSIS — R20.2 PARESTHESIA AND PAIN OF BOTH UPPER EXTREMITIES: ICD-10-CM

## 2025-01-08 LAB
ALBUMIN SERPL BCG-MCNC: 4.2 G/DL (ref 3.5–5)
ALP SERPL-CCNC: 37 U/L (ref 34–104)
ALT SERPL W P-5'-P-CCNC: 8 U/L (ref 7–52)
ANION GAP SERPL CALCULATED.3IONS-SCNC: 9 MMOL/L (ref 4–13)
AST SERPL W P-5'-P-CCNC: 14 U/L (ref 13–39)
BASOPHILS # BLD AUTO: 0.02 THOUSANDS/ΜL (ref 0–0.1)
BASOPHILS NFR BLD AUTO: 0 % (ref 0–1)
BILIRUB SERPL-MCNC: 0.85 MG/DL (ref 0.2–1)
BUN SERPL-MCNC: 20 MG/DL (ref 5–25)
CALCIUM SERPL-MCNC: 8.7 MG/DL (ref 8.4–10.2)
CHLORIDE SERPL-SCNC: 104 MMOL/L (ref 96–108)
CHOLEST SERPL-MCNC: 170 MG/DL (ref ?–200)
CO2 SERPL-SCNC: 25 MMOL/L (ref 21–32)
CREAT SERPL-MCNC: 0.52 MG/DL (ref 0.6–1.3)
EOSINOPHIL # BLD AUTO: 0.16 THOUSAND/ΜL (ref 0–0.61)
EOSINOPHIL NFR BLD AUTO: 3 % (ref 0–6)
ERYTHROCYTE [DISTWIDTH] IN BLOOD BY AUTOMATED COUNT: 13.2 % (ref 11.6–15.1)
EST. AVERAGE GLUCOSE BLD GHB EST-MCNC: 108 MG/DL
GFR SERPL CREATININE-BSD FRML MDRD: 127 ML/MIN/1.73SQ M
GLUCOSE P FAST SERPL-MCNC: 90 MG/DL (ref 65–99)
HBA1C MFR BLD: 5.4 %
HCT VFR BLD AUTO: 36.2 % (ref 34.8–46.1)
HDLC SERPL-MCNC: 62 MG/DL
HGB BLD-MCNC: 11.7 G/DL (ref 11.5–15.4)
IMM GRANULOCYTES # BLD AUTO: 0.02 THOUSAND/UL (ref 0–0.2)
IMM GRANULOCYTES NFR BLD AUTO: 0 % (ref 0–2)
LDLC SERPL CALC-MCNC: 88 MG/DL (ref 0–100)
LYMPHOCYTES # BLD AUTO: 1.33 THOUSANDS/ΜL (ref 0.6–4.47)
LYMPHOCYTES NFR BLD AUTO: 23 % (ref 14–44)
MCH RBC QN AUTO: 28.6 PG (ref 26.8–34.3)
MCHC RBC AUTO-ENTMCNC: 32.3 G/DL (ref 31.4–37.4)
MCV RBC AUTO: 89 FL (ref 82–98)
MONOCYTES # BLD AUTO: 0.45 THOUSAND/ΜL (ref 0.17–1.22)
MONOCYTES NFR BLD AUTO: 8 % (ref 4–12)
NEUTROPHILS # BLD AUTO: 3.73 THOUSANDS/ΜL (ref 1.85–7.62)
NEUTS SEG NFR BLD AUTO: 66 % (ref 43–75)
NRBC BLD AUTO-RTO: 0 /100 WBCS
PLATELET # BLD AUTO: 265 THOUSANDS/UL (ref 149–390)
PMV BLD AUTO: 11.3 FL (ref 8.9–12.7)
POTASSIUM SERPL-SCNC: 3.9 MMOL/L (ref 3.5–5.3)
PROT SERPL-MCNC: 7 G/DL (ref 6.4–8.4)
RBC # BLD AUTO: 4.09 MILLION/UL (ref 3.81–5.12)
SODIUM SERPL-SCNC: 138 MMOL/L (ref 135–147)
TRIGL SERPL-MCNC: 99 MG/DL (ref ?–150)
WBC # BLD AUTO: 5.71 THOUSAND/UL (ref 4.31–10.16)

## 2025-01-08 PROCEDURE — 82746 ASSAY OF FOLIC ACID SERUM: CPT

## 2025-01-08 PROCEDURE — 99395 PREV VISIT EST AGE 18-39: CPT | Performed by: INTERNAL MEDICINE

## 2025-01-08 PROCEDURE — 86618 LYME DISEASE ANTIBODY: CPT

## 2025-01-08 PROCEDURE — 85025 COMPLETE CBC W/AUTO DIFF WBC: CPT

## 2025-01-08 PROCEDURE — 80061 LIPID PANEL: CPT

## 2025-01-08 PROCEDURE — 86225 DNA ANTIBODY NATIVE: CPT

## 2025-01-08 PROCEDURE — 82607 VITAMIN B-12: CPT

## 2025-01-08 PROCEDURE — 86038 ANTINUCLEAR ANTIBODIES: CPT

## 2025-01-08 PROCEDURE — 86430 RHEUMATOID FACTOR TEST QUAL: CPT

## 2025-01-08 PROCEDURE — 83036 HEMOGLOBIN GLYCOSYLATED A1C: CPT

## 2025-01-08 PROCEDURE — 36415 COLL VENOUS BLD VENIPUNCTURE: CPT

## 2025-01-08 PROCEDURE — 84443 ASSAY THYROID STIM HORMONE: CPT

## 2025-01-08 PROCEDURE — 80053 COMPREHEN METABOLIC PANEL: CPT

## 2025-01-08 RX ORDER — MELOXICAM 15 MG/1
15 TABLET ORAL DAILY
Qty: 30 TABLET | Refills: 1 | Status: SHIPPED | OUTPATIENT
Start: 2025-01-08

## 2025-01-08 NOTE — PATIENT INSTRUCTIONS
Patient Education     Carpal Tunnel Exercises   About this topic   Carpal tunnel syndrome is a very common health problem. It is most often caused by doing hand or wrist movements over and over. It can also be caused by using the lower arm muscles too much.  The carpal tunnel is the small area in your wrist that your median nerve runs through. A tough band of tissues called a ligament holds everything in place over the carpal tunnel. Your median nerve runs from your neck through your lower arm into your hand. If this nerve is squeezed at the wrist area, you may feel pain and have other signs. Your hand, fingers, and wrist may feel weak, numb, or tingly. This is called carpal tunnel syndrome.  Your doctor may want you to try exercises to help your signs. Other times, you will do these exercises after surgery.  General   Before starting with a program, ask your doctor if you are healthy enough to do these exercises. Your doctor may have you work with a  or physical therapist to make a safe exercise program to meet your needs.  Stretching Exercises   Stretching exercises keep your muscles flexible. They also stop them from getting tight. Start by doing each of these stretches 2 to 3 times. In order for your body to make changes, you will need to hold these stretches for 20 to 30 seconds. Repeat each exercise 2 to 3 times each day. Do all exercises slowly.  Wrist stretches bending back ? Straighten your elbow and have your palm facing up. Keeping your elbow straight, bend your wrist back so that your fingers are now pointing to the floor. Grab your hand with your other hand and push back the wrist until you feel a stretch. If you just had surgery, you should not do this exercise until your therapist or doctor tells you it is OK.  Strengthening Exercises   Strengthening exercises keep your muscles firm and strong. Sit while doing these exercises. Be sure to use good posture. Start by repeating each exercise 2 to 3  times. Work up to doing each exercise 10 times. Try to do the exercises 2 to 3 times each day. Do all exercises slowly.  Tendon gliding exercises using 4 positions ? Start by holding your hand with your fingers straight. Then, bend only the last two joints of your fingers and move your fingers into a hook or claw position. Next, straighten your fingers and bend your knuckles to make a flat table top position. This is also called the duckbill position. Last, make a full fist. Moving your hand into all 4 positions is one exercise.  Wrist exercises:  Side-to-side ? Hold one arm still using your other hand. Move your hand from side to side.  Up and down ? Hold one arm still using your other hand. Bend your wrist up and down.  Wrist circles ? Move each wrist in a [place] in one direction. Now, move each wrist in a [place] in the other direction.           What will the results be?   Less pain, pressure, stiffness, and swelling in your wrist and hand  Ease numbness and tingling in your hand and fingers  Increased blood flow to the nerves, muscles, and joints of your wrist and hand to help healing  Increased hand and  strength  Keep your muscles and joints strong and flexible  Helpful tips   Stay active and work out to keep your muscles strong and flexible.  Be sure you do not hold your breath when exercising. This can raise your blood pressure. If you tend to hold your breath, try counting out loud when exercising. If any exercise bothers you, stop right away.  Always warm up before stretching. Heated muscles stretch much easier than cool muscles. Stretching cool muscles can lead to injury.  Try walking and swinging your arms at an easy pace for a few minutes to warm up your muscles. Do this again after exercising.  Never bounce when doing stretches.  Doing exercises before a meal may be a good way to get into a routine.  Exercise may be slightly uncomfortable, but you should not have sharp pains. If you do get sharp  pains, stop what you are doing. If the sharp pains continue, call your doctor.  Last Reviewed Date   2021-05-10  Consumer Information Use and Disclaimer   This generalized information is a limited summary of diagnosis, treatment, and/or medication information. It is not meant to be comprehensive and should be used as a tool to help the user understand and/or assess potential diagnostic and treatment options. It does NOT include all information about conditions, treatments, medications, side effects, or risks that may apply to a specific patient. It is not intended to be medical advice or a substitute for the medical advice, diagnosis, or treatment of a health care provider based on the health care provider's examination and assessment of a patient’s specific and unique circumstances. Patients must speak with a health care provider for complete information about their health, medical questions, and treatment options, including any risks or benefits regarding use of medications. This information does not endorse any treatments or medications as safe, effective, or approved for treating a specific patient. UpToDate, Inc. and its affiliates disclaim any warranty or liability relating to this information or the use thereof. The use of this information is governed by the Terms of Use, available at https://www.[employer] Servicesuwer.com/en/know/clinical-effectiveness-terms   Copyright   Copyright © 2024 UpToDate, Inc. and its affiliates and/or licensors. All rights reserved.

## 2025-01-08 NOTE — PROGRESS NOTES
"Name: Ramona Gallagher      : 1995      MRN: [id number]  Encounter Provider: Milton Garcia DO  Encounter Date: 2025   Encounter department: [facility]  :  Assessment & Plan  Encounter for immunization         Paresthesia and pain of both upper extremities    Orders:    CBC and differential; Future    Comprehensive metabolic panel; Future    Hemoglobin A1C; Future    TSH, 3rd generation; Future    Folate; Future    RF Screen w/ Reflex to Titer; Future    Lyme Total AB W Reflex to IGM/IGG; Future    Vitamin B12; Future    LULI Screen w/Reflex Cascade; Future    US MSK limited; Future    meloxicam (MOBIC) 15 mg tablet; Take 1 tablet (15 mg total) by mouth daily    A/P: Doing well and co-morbidities are stable. Suspect CTS given hx and PE.  DDx still includes c spine radiculopathy, ulnar entrapment, DM, thyroid, CTD, Lymes, etc. Labs will be checked including routine. Will check an US since EMG is booked out. Start NSAID\"s.. No mental or physical restrictions. No evidence of communicable diseases. Continue current treatment and RTC one year for annual and three weeks for f/u labs and paresthesia. .          History of Present Illness     WF presents for a well exam. Doing ok, but reports a three month h/o progressive bilat hand numbness and pain. No weakness or neck pain. NO trauma. Usually awakens from sleep. RHD. Works as a . No tick bites, h/o DM, nutritional issues, etc. Similar s/s during pregnancy.  Remains active w/o difficulty and no falls. Denies depression. No suicidal or violent ideations. Working as witress. No recent travel. No fever, chills, or sweats. No unexplained wt change. Denies CP, SOB, palpitations, edema, orthopnea, or PND. No sz or syncope. No changes in bowel or bladder habits. No trouble swallowing. Appetite and sleep are good. Overdue to see both a DDS and an eye doctor. No change in PMH, PSH, ALL, OH, SH, or Fhx. Currently due for labs and vaccines. . " "           Review of Systems   Constitutional:  Positive for activity change. Negative for chills, diaphoresis, fatigue and fever.   HENT: Negative.     Eyes:  Negative for visual disturbance.   Respiratory:  Negative for cough, chest tightness, shortness of breath and wheezing.    Cardiovascular:  Negative for chest pain, palpitations and leg swelling.   Gastrointestinal:  Negative for abdominal pain, constipation, diarrhea, nausea and vomiting.   Endocrine: Negative for cold intolerance and heat intolerance.   Genitourinary:  Negative for difficulty urinating, dysuria and frequency.   Musculoskeletal:  Negative for arthralgias, gait problem and myalgias.   Neurological:  Positive for numbness. Negative for dizziness, tremors, seizures, syncope, weakness, light-headedness and headaches.   Psychiatric/Behavioral:  Positive for sleep disturbance. Negative for confusion and dysphoric mood. The patient is not nervous/anxious.        Objective   /80 (Patient Position: Sitting, Cuff Size: Large)   Pulse 77   Temp 97.9 °F (36.6 °C) (Tympanic)   Ht 5' 1\" (1.549 m)   Wt 86.6 kg (191 lb)   LMP 12/15/2024 (Exact Date)   SpO2 97%   BMI 36.09 kg/m²      Physical Exam  Vitals and nursing note reviewed.   Constitutional:       General: She is not in acute distress.     Appearance: Normal appearance. She is not ill-appearing.   HENT:      Head: Normocephalic and atraumatic.      Mouth/Throat:      Mouth: Mucous membranes are moist.   Eyes:      Extraocular Movements: Extraocular movements intact.      Conjunctiva/sclera: Conjunctivae normal.      Pupils: Pupils are equal, round, and reactive to light.   Neck:      Vascular: No carotid bruit.   Cardiovascular:      Rate and Rhythm: Normal rate and regular rhythm.      Heart sounds: Normal heart sounds. No murmur heard.  Pulmonary:      Effort: Pulmonary effort is normal. No respiratory distress.      Breath sounds: Normal breath sounds. No wheezing, rhonchi or rales. "   Abdominal:      General: Bowel sounds are normal. There is no distension.      Palpations: Abdomen is soft.      Tenderness: There is no abdominal tenderness.   Musculoskeletal:         General: No swelling, tenderness or deformity. Normal range of motion.      Cervical back: Neck supple.      Right lower leg: No edema.      Left lower leg: No edema.      Comments: C Spine w/o gross deformities, increase temp, erythema, swelling, or lesions. Tenderness none. ROM wnl. Spasms none. UE strength 5/5 with tone/ROM WNL. DTR 2/4. Grasp wnl. Negative finkelstein, Phalen's, and tinel's.    Neurological:      General: No focal deficit present.      Mental Status: She is alert and oriented to person, place, and time. Mental status is at baseline.      Cranial Nerves: No cranial nerve deficit.      Motor: No weakness.      Coordination: Coordination normal.      Gait: Gait normal.      Deep Tendon Reflexes: Reflexes normal.   Psychiatric:         Mood and Affect: Mood normal.         Behavior: Behavior normal.         Thought Content: Thought content normal.         Judgment: Judgment normal.

## 2025-01-09 ENCOUNTER — RESULTS FOLLOW-UP (OUTPATIENT)
Dept: INTERNAL MEDICINE CLINIC | Facility: CLINIC | Age: 30
End: 2025-01-09

## 2025-01-09 LAB
B BURGDOR IGG+IGM SER QL IA: NEGATIVE
FOLATE SERPL-MCNC: 13.8 NG/ML
RHEUMATOID FACT SER QL LA: NEGATIVE
TSH SERPL DL<=0.05 MIU/L-ACNC: 11.07 UIU/ML (ref 0.45–4.5)
VIT B12 SERPL-MCNC: 288 PG/ML (ref 180–914)

## 2025-01-10 LAB
DSDNA IGG SERPL IA-ACNC: 1.5 IU/ML (ref ?–15)
NUCLEAR IGG SER IA-RTO: 0.2 RATIO (ref ?–1)

## 2025-01-13 ENCOUNTER — HOSPITAL ENCOUNTER (OUTPATIENT)
Dept: RADIOLOGY | Facility: HOSPITAL | Age: 30
Discharge: HOME | End: 2025-01-13
Attending: INTERNAL MEDICINE
Payer: COMMERCIAL

## 2025-01-13 ENCOUNTER — RESULTS FOLLOW-UP (OUTPATIENT)
Dept: INTERNAL MEDICINE CLINIC | Facility: CLINIC | Age: 30
End: 2025-01-13

## 2025-01-13 DIAGNOSIS — M79.602 PARESTHESIA AND PAIN OF BOTH UPPER EXTREMITIES: ICD-10-CM

## 2025-01-13 DIAGNOSIS — M79.601 PARESTHESIA AND PAIN OF BOTH UPPER EXTREMITIES: ICD-10-CM

## 2025-01-13 DIAGNOSIS — R20.2 PARESTHESIA AND PAIN OF BOTH UPPER EXTREMITIES: ICD-10-CM

## 2025-01-13 PROCEDURE — 76882 US LMTD JT/FCL EVL NVASC XTR: CPT

## 2025-01-14 NOTE — TELEPHONE ENCOUNTER
Ramona returned call to PCP office. Informed her of results and recommendation, per PCP note.    She would like referral to hand specialist, as recommended.

## 2025-01-20 ENCOUNTER — OFFICE VISIT (OUTPATIENT)
Dept: OBGYN CLINIC | Facility: CLINIC | Age: 30
End: 2025-01-20
Payer: COMMERCIAL

## 2025-01-20 DIAGNOSIS — M79.642 BILATERAL HAND PAIN: Primary | ICD-10-CM

## 2025-01-20 DIAGNOSIS — M79.641 BILATERAL HAND PAIN: Primary | ICD-10-CM

## 2025-01-20 DIAGNOSIS — G56.03 CARPAL TUNNEL SYNDROME ON BOTH SIDES: ICD-10-CM

## 2025-01-20 PROCEDURE — 99203 OFFICE O/P NEW LOW 30 MIN: CPT | Performed by: SURGERY

## 2025-01-20 NOTE — PROGRESS NOTES
ORTHOPAEDIC HAND, WRIST, AND ELBOW OFFICE  VISIT       ASSESSMENT/PLAN:      29 y.o. year old female who presents with Bilateral Carpal Tunnel Syndrome    Physical exam was performed and we discussed the findings  Recommend US of the bilateral elbows as she has some positive signs of Cubital tunnel  US ordered  Recommend return to bracing. Discussed soft elbow bracing as well as wrist bracing. Ace bandages provided  She will order wrist braces online  Can cont mobic    The patient verbalized understanding of exam findings and treatment plan. We engaged in the shared decision-making process and treatment options were discussed at length with the patient. Surgical and conservative management discussed today along with risks and benefits.    Diagnoses and all orders for this visit:    Bilateral hand pain  -     Ambulatory Referral to Orthopedic Surgery  -     US MSK limited; Future    Carpal tunnel syndrome on both sides  -     Ambulatory Referral to Orthopedic Surgery        Follow Up:  Return in about 6 weeks (around 3/3/2025) for Recheck.    To Do Next Visit:  Re-evaluation of current issue        ____________________________________________________________________________________________________________________________________________      CHIEF COMPLAINT:  Chief Complaint   Patient presents with    Right Wrist - Pain    Left Hand - Pain       SUBJECTIVE:  Ramona Gallagher is a 29 y.o. year old  female who presents for evaluation  Bilateral hand numbness    Patient states she had numbness into her hands during her first pregnancy about 8-9 years ago. Symptoms resolved after her first pregnancy.   She states during her 2nd pregnancy she was using braces but they did not provided long term relief. Symptoms seems to resolve after her second pregnancy but about 3 months ago her numbness returned. Symptoms are not constant and are more in the AM when he wakes up and when she is doing certain activities, roxane driving. Her  thumb and ring finger seem to be affected the most. Right hand is worse that the left. She has not returned to bracing and is using Mobic which seemed to help but she just stated using it     I have personally reviewed all the relevant PMH, PSH, SH, FH, Medications and allergies      PAST MEDICAL HISTORY:  Past Medical History:   Diagnosis Date    Abnormal Pap smear of cervix     2016 abn pap, colpo WNL, cryotherapy; 10/2022 HSIL pap    Asthma     Depression     Preeclampsia 2016       PAST SURGICAL HISTORY:  Past Surgical History:   Procedure Laterality Date    GYNECOLOGIC CRYOSURGERY  2016       FAMILY HISTORY:  Family History   Problem Relation Age of Onset    Hypertension Father     Cancer Neg Hx     Venous thrombosis Neg Hx     Diabetes Neg Hx     Breast cancer Neg Hx     Colon cancer Neg Hx     Ovarian cancer Neg Hx        SOCIAL HISTORY:  Social History     Tobacco Use    Smoking status: Some Days     Current packs/day: 0.25     Average packs/day: 0.3 packs/day for 17.3 years (4.5 ttl pk-yrs)     Types: E-Cigarettes, Cigarettes     Passive exposure: Never    Smokeless tobacco: Never   Vaping Use    Vaping status: Every Day    Substances: Nicotine, THC   Substance Use Topics    Alcohol use: Not Currently    Drug use: Not Currently     Types: Marijuana     Comment: last used 4/11/2023       MEDICATIONS:    Current Outpatient Medications:     meloxicam (MOBIC) 15 mg tablet, Take 1 tablet (15 mg total) by mouth daily, Disp: 30 tablet, Rfl: 1    naloxone (NARCAN) 4 mg/0.1 mL nasal spray, Administer 1 spray into a nostril. If no response after 2-3 minutes, give another dose in the other nostril using a new spray., Disp: 1 each, Rfl: 0    Sublocade 300 MG/1.5ML, , Disp: , Rfl:     Current Facility-Administered Medications:     intrauterine copper (PARAGARD) IUD, 1 each, Intrauterine Device, Once every 10 years,     ALLERGIES:  No Known Allergies        REVIEW OF SYSTEMS:  Review of Systems   Constitutional:   Negative for chills and fever.   HENT:  Negative for ear pain and sore throat.    Eyes:  Negative for pain and visual disturbance.   Respiratory:  Negative for cough and shortness of breath.    Cardiovascular:  Negative for chest pain and palpitations.   Gastrointestinal:  Negative for abdominal pain and vomiting.   Genitourinary:  Negative for dysuria and hematuria.   Musculoskeletal:  Negative for arthralgias and back pain.   Skin:  Negative for color change and rash.   Neurological:  Negative for seizures and syncope.   All other systems reviewed and are negative.      VITALS:  There were no vitals filed for this visit.    LABS:  HgA1c:   Lab Results   Component Value Date    HGBA1C 5.4 01/08/2025     BMP:   Lab Results   Component Value Date    CALCIUM 8.7 01/08/2025    K 3.9 01/08/2025    CO2 25 01/08/2025     01/08/2025    BUN 20 01/08/2025    CREATININE 0.52 (L) 01/08/2025       _____________________________________________________  PHYSICAL EXAMINATION:  General: well developed and well nourished, alert, oriented times 3, and appears comfortable  Psychiatric: Normal  HEENT: Normocephalic, Atraumatic Trachea Midline, No torticollis  Pulmonary: No audible wheezing or respiratory distress   Abdomen/GI: Non tender, non distended   Cardiovascular: No pitting edema, 2+ radial pulse   Skin: No masses, erythema, lacerations, fluctation, ulcerations  Neurovascular: Sensation Intact to the Median, Ulnar, Radial Nerve, Motor Intact to the Median, Ulnar, Radial Nerve, and Pulses Intact  Musculoskeletal: Normal, except as noted in detailed exam and in HPI.      MUSCULOSKELETAL EXAMINATION:  Right hand:  SILT  Composite fist      Positive Durkan's  Positive elbow flexion compression test        Left hand:  SILT  Composite fist      Positive Durkan's  Positive elbow flexion compression test      ___________________________________________________  STUDIES REVIEWED:    I have personally reviewed and interpreted  US of  bilateral wrists which demonstrate      FINDINGS:     RIGHT SIDE:  Median nerve cross sectional area distal forearm (CSAp): 6.6 sq mm.     Maximum median nerve cross sectional area within carpal tunnel (CSAc): 19.1 sq mm.     Delta CSA (CSAc-CSAp): 12.5 sq mm.     Wrist to Forearm ratio (WFR ratio) (CSAc/CSAp): 2.9.     Vascularity: No hypervascularity.           LEFT SIDE:  Median nerve cross sectional area distal forearm (CSAp): 12.5 sq mm.     Maximum median nerve cross sectional area within carpal tunnel (CSAc): 16.9 sq mm.     Delta CSA (CSAc-CSAp): 4.4 sq mm.     Wrist to Forearm ratio (WFR ratio) (CSAc/CSAp): 1.4.     Vascularity: Abnormal Hypervascularity detected.           IMPRESSION:     RIGHT SIDE: Carpal tunnel syndrome ruled in based on marked abnormal CSA >/= 14 sq mm.     LEFT SIDE: Carpal tunnel syndrome ruled in based on marked abnormal CSA >/= 14 sq mm.    PROCEDURES PERFORMED:  Procedures  No Procedures performed today    _____________________________________________________      Scribe Attestation      I,:  Dhruv Hoyos am acting as a scribe while in the presence of the attending physician.:       I,:  Bunny De La Fuente MD personally performed the services described in this documentation    as scribed in my presence.:

## 2025-01-29 ENCOUNTER — OFFICE VISIT (OUTPATIENT)
Dept: INTERNAL MEDICINE CLINIC | Facility: CLINIC | Age: 30
End: 2025-01-29
Payer: COMMERCIAL

## 2025-01-29 VITALS
HEART RATE: 65 BPM | DIASTOLIC BLOOD PRESSURE: 72 MMHG | HEIGHT: 61 IN | BODY MASS INDEX: 36.55 KG/M2 | TEMPERATURE: 96.8 F | OXYGEN SATURATION: 99 % | WEIGHT: 193.6 LBS | SYSTOLIC BLOOD PRESSURE: 120 MMHG

## 2025-01-29 DIAGNOSIS — E03.9 ACQUIRED HYPOTHYROIDISM: Primary | ICD-10-CM

## 2025-01-29 DIAGNOSIS — Z23 ENCOUNTER FOR IMMUNIZATION: ICD-10-CM

## 2025-01-29 DIAGNOSIS — G56.03 BILATERAL CARPAL TUNNEL SYNDROME: ICD-10-CM

## 2025-01-29 PROCEDURE — 99213 OFFICE O/P EST LOW 20 MIN: CPT | Performed by: INTERNAL MEDICINE

## 2025-01-29 RX ORDER — BUPRENORPHINE 100 MG/1
SOLUTION SUBCUTANEOUS
COMMUNITY
Start: 2025-01-11

## 2025-01-29 RX ORDER — LEVOTHYROXINE SODIUM 100 UG/1
100 TABLET ORAL
Qty: 100 TABLET | Refills: 3 | Status: SHIPPED | OUTPATIENT
Start: 2025-01-29

## 2025-01-29 NOTE — PATIENT INSTRUCTIONS
Patient Education     Carpal Tunnel Exercises   About this topic   Carpal tunnel syndrome is a very common health problem. It is most often caused by doing hand or wrist movements over and over. It can also be caused by using the lower arm muscles too much.  The carpal tunnel is the small area in your wrist that your median nerve runs through. A tough band of tissues called a ligament holds everything in place over the carpal tunnel. Your median nerve runs from your neck through your lower arm into your hand. If this nerve is squeezed at the wrist area, you may feel pain and have other signs. Your hand, fingers, and wrist may feel weak, numb, or tingly. This is called carpal tunnel syndrome.  Your doctor may want you to try exercises to help your signs. Other times, you will do these exercises after surgery.  General   Before starting with a program, ask your doctor if you are healthy enough to do these exercises. Your doctor may have you work with a  or physical therapist to make a safe exercise program to meet your needs.  Stretching Exercises   Stretching exercises keep your muscles flexible. They also stop them from getting tight. Start by doing each of these stretches 2 to 3 times. In order for your body to make changes, you will need to hold these stretches for 20 to 30 seconds. Repeat each exercise 2 to 3 times each day. Do all exercises slowly.  Wrist stretches bending back ? Straighten your elbow and have your palm facing up. Keeping your elbow straight, bend your wrist back so that your fingers are now pointing to the floor. Grab your hand with your other hand and push back the wrist until you feel a stretch. If you just had surgery, you should not do this exercise until your therapist or doctor tells you it is OK.  Strengthening Exercises   Strengthening exercises keep your muscles firm and strong. Sit while doing these exercises. Be sure to use good posture. Start by repeating each exercise 2 to 3  times. Work up to doing each exercise 10 times. Try to do the exercises 2 to 3 times each day. Do all exercises slowly.  Tendon gliding exercises using 4 positions ? Start by holding your hand with your fingers straight. Then, bend only the last two joints of your fingers and move your fingers into a hook or claw position. Next, straighten your fingers and bend your knuckles to make a flat table top position. This is also called the duckbill position. Last, make a full fist. Moving your hand into all 4 positions is one exercise.  Wrist exercises:  Side-to-side ? Hold one arm still using your other hand. Move your hand from side to side.  Up and down ? Hold one arm still using your other hand. Bend your wrist up and down.  Wrist circles ? Move each wrist in a [place] in one direction. Now, move each wrist in a [place] in the other direction.           What will the results be?   Less pain, pressure, stiffness, and swelling in your wrist and hand  Ease numbness and tingling in your hand and fingers  Increased blood flow to the nerves, muscles, and joints of your wrist and hand to help healing  Increased hand and  strength  Keep your muscles and joints strong and flexible  Helpful tips   Stay active and work out to keep your muscles strong and flexible.  Be sure you do not hold your breath when exercising. This can raise your blood pressure. If you tend to hold your breath, try counting out loud when exercising. If any exercise bothers you, stop right away.  Always warm up before stretching. Heated muscles stretch much easier than cool muscles. Stretching cool muscles can lead to injury.  Try walking and swinging your arms at an easy pace for a few minutes to warm up your muscles. Do this again after exercising.  Never bounce when doing stretches.  Doing exercises before a meal may be a good way to get into a routine.  Exercise may be slightly uncomfortable, but you should not have sharp pains. If you do get sharp  pains, stop what you are doing. If the sharp pains continue, call your doctor.  Last Reviewed Date   2021-05-10  Consumer Information Use and Disclaimer   This generalized information is a limited summary of diagnosis, treatment, and/or medication information. It is not meant to be comprehensive and should be used as a tool to help the user understand and/or assess potential diagnostic and treatment options. It does NOT include all information about conditions, treatments, medications, side effects, or risks that may apply to a specific patient. It is not intended to be medical advice or a substitute for the medical advice, diagnosis, or treatment of a health care provider based on the health care provider's examination and assessment of a patient’s specific and unique circumstances. Patients must speak with a health care provider for complete information about their health, medical questions, and treatment options, including any risks or benefits regarding use of medications. This information does not endorse any treatments or medications as safe, effective, or approved for treating a specific patient. UpToDate, Inc. and its affiliates disclaim any warranty or liability relating to this information or the use thereof. The use of this information is governed by the Terms of Use, available at https://www.[employer].com/en/know/clinical-effectiveness-terms   Copyright   Copyright © 2024 UpToDate, Inc. and its affiliates and/or licensors. All rights reserved.  Patient Education     Hypothyroidism (underactive thyroid)   The Basics   Written by the doctors and editors at [facility]   What is hypothyroidism? -- Hypothyroidism happens when a gland in your neck, called the thyroid gland, makes too little thyroid hormone. This hormone controls how the body uses and stores energy (figure 1).  With a different condition, hyperthyroidism, the thyroid gland makes too much thyroid hormone. With hypothyroidism, it does not make  "enough. Doctors sometimes also use the term \"underactive thyroid.\"  What causes hypothyroidism? -- Different things can cause the thyroid gland to be unable to make enough thyroid hormone. These include:   Problems with the immune system - The immune system is the body's infection-fighting system. Sometimes, a person's immune system attacks healthy cells, such as cells in the thyroid. This is called \"chronic autoimmune thyroiditis\" or \"Hashimoto's thyroiditis.\" It is the most common cause of hypothyroidism in the .   Thyroidectomy - This is surgery to remove the thyroid gland.   Radioiodine therapy - This is a treatment used for hyperthyroidism. It often causes hypothyroidism because it destroys part of the thyroid gland.   Radiation in the neck area - High doses of radiation (for example, to treat cancer) can damage the thyroid gland.   Certain medicines  The treatment of hypothyroidism is the same no matter what caused it.  What are the symptoms of hypothyroidism? -- Some people with hypothyroidism have no symptoms. But most people feel tired. That can make it hard to know if a person has it, because a lot of conditions can make you tired.  Other symptoms of hypothyroidism include:   Lack of energy   Getting cold easily   Developing coarse or thin hair   Getting constipated (having too few bowel movements)  If it is not treated, hypothyroidism can also weaken and slow your heart. This can make you feel out of breath or tired when you exercise. It can also cause swelling (fluid buildup) in your ankles. Untreated hypothyroidism can also increase your blood pressure and raise your cholesterol. Both of these things increase the risk of heart problems.  Hypothyroidism can disrupt monthly periods. It can also make it hard to get pregnant. In people who do get pregnant, hypothyroidism can cause problems. For instance, it can increase the chances of having a miscarriage. (A miscarriage is when a pregnancy ends on its " own before 20 weeks.)  Is there a test for hypothyroidism? -- Yes. Your doctor or nurse can check for hypothyroidism using a simple blood test.  How is hypothyroidism treated? -- Treatment involves taking thyroid hormone pills every day. After you take the pills for about 6 weeks, your doctor or nurse will test your blood again. This is to make sure that the levels are where they should be. They might adjust your dose depending on the results. Most people with hypothyroidism need to keep taking thyroid pills for the rest of their life. This gives your body the right level of the hormone that it cannot make on its own.  Thyroid hormone pills come in different brand name and generic forms. All of the pills work equally well. If possible, stick with the same generic or brand name. But switching between pills does not cause problems for most people. Talk to your doctor or nurse if you want to switch for some reason.  Never change your dose of thyroid hormone on your own. Taking too much thyroid hormone can cause heart rhythm problems and even damage your bones.  What if I want to get pregnant? -- You can try to get pregnant. Many people with hypothyroidism have healthy pregnancies. But your doctor or nurse will most likely need to change your dose of thyroid hormone once you are pregnant. That's because you need more thyroid hormone during pregnancy. They will also measure your levels of thyroid hormone 4 weeks after any change in your dose, and at least once during each trimester of pregnancy.  All topics are updated as new evidence becomes available and our peer review process is complete.  This topic retrieved from [employer] on: Feb 26, 2024.  Topic 82585 Version 11.0  Release: 32.2.4 - C32.56  © 2024 UpToDate, Inc. and/or its affiliates. All rights reserved.  figure 1: Thyroid and parathyroid glands     The thyroid is a butterfly-shaped gland in the middle of the neck. It sits just below the larynx (voice box). The  thyroid makes 2 hormones, called T3 and T4, which control how the body uses and stores energy. The parathyroid glands are 4 small glands behind the thyroid. They make a hormone called parathyroid hormone, which helps control the amount of calcium in the blood.  Graphic 76295 Version 10.0  Consumer Information Use and Disclaimer   Disclaimer: This generalized information is a limited summary of diagnosis, treatment, and/or medication information. It is not meant to be comprehensive and should be used as a tool to help the user understand and/or assess potential diagnostic and treatment options. It does NOT include all information about conditions, treatments, medications, side effects, or risks that may apply to a specific patient. It is not intended to be medical advice or a substitute for the medical advice, diagnosis, or treatment of a health care provider based on the health care provider's examination and assessment of a patient's specific and unique circumstances. Patients must speak with a health care provider for complete information about their health, medical questions, and treatment options, including any risks or benefits regarding use of medications. This information does not endorse any treatments or medications as safe, effective, or approved for treating a specific patient. UpToDate, Inc. and its affiliates disclaim any warranty or liability relating to this information or the use thereof.The use of this information is governed by the Terms of Use, available at https://www.[employer].com/en/know/clinical-effectiveness-terms. 2024© UpToDate, Inc. and its affiliates and/or licensors. All rights reserved.  Copyright   © 2024 UpToDate, Inc. and/or its affiliates. All rights reserved.

## 2025-01-29 NOTE — PROGRESS NOTES
"Name: Ramona Gallagher      : 1995      MRN: [id number]  Encounter Provider: Milton Garcia DO  Encounter Date: 2025   Encounter department: [facility]  :  Assessment & Plan  Encounter for immunization         Acquired hypothyroidism    Orders:    levothyroxine 100 mcg tablet; Take 1 tablet (100 mcg total) by mouth daily in the early morning    TSH, 3rd generation; Future    Bilateral carpal tunnel syndrome    Orders:    Ambulatory Referral to Orthopedic Surgery; Future    A/P: Discussed labs and US. Will refer to hand sx for the CTS. Give exercises as well. Discussed thyroid and pt wishes to start replacement. Script sent and will need f/u TSH in six weeks. RTC six months for routine.        History of Present Illness   WF RTC for several week f/u bilat UE hand numbness, tingling, and weakness. ?cause, but felt to most likely be CTS. Labs done and TSH is elevated. No PMH or FHx of thyroid issues and no exposures. H/o gestational dm, but no DM or other autoimmune issues.       Review of Systems   Constitutional:  Negative for activity change, chills, diaphoresis, fatigue and fever.   Respiratory:  Negative for cough, chest tightness, shortness of breath and wheezing.    Cardiovascular:  Negative for chest pain, palpitations and leg swelling.   Gastrointestinal:  Negative for abdominal pain, constipation, diarrhea, nausea and vomiting.   Endocrine: Negative for cold intolerance and heat intolerance.   Genitourinary:  Negative for difficulty urinating, dysuria and frequency.   Musculoskeletal:  Negative for arthralgias, gait problem and myalgias.   Neurological:  Positive for weakness and numbness. Negative for dizziness, seizures, syncope, light-headedness and headaches.   Psychiatric/Behavioral:  Negative for confusion. The patient is not nervous/anxious.        Objective   /72   Pulse 65   Temp (!) 96.8 °F (36 °C) (Tympanic)   Ht 5' 1\" (1.549 m)   Wt 87.8 kg (193 lb 9.6 " oz)   LMP 12/15/2024 (Exact Date)   SpO2 99%   BMI 36.58 kg/m²      Physical Exam  Vitals and nursing note reviewed.   Constitutional:       General: She is not in acute distress.     Appearance: Normal appearance. She is not ill-appearing.   HENT:      Head: Normocephalic and atraumatic.      Mouth/Throat:      Mouth: Mucous membranes are moist.   Eyes:      Extraocular Movements: Extraocular movements intact.      Conjunctiva/sclera: Conjunctivae normal.      Pupils: Pupils are equal, round, and reactive to light.   Neck:      Vascular: No carotid bruit.      Comments: NO TM  Cardiovascular:      Rate and Rhythm: Normal rate and regular rhythm.      Heart sounds: Normal heart sounds. No murmur heard.  Pulmonary:      Effort: Pulmonary effort is normal. No respiratory distress.      Breath sounds: Normal breath sounds. No wheezing, rhonchi or rales.   Musculoskeletal:         General: No swelling or tenderness.      Cervical back: Neck supple. No tenderness.   Neurological:      General: No focal deficit present.      Mental Status: She is alert and oriented to person, place, and time. Mental status is at baseline.   Psychiatric:         Mood and Affect: Mood normal.         Behavior: Behavior normal.         Thought Content: Thought content normal.         Judgment: Judgment normal.

## 2025-02-19 ENCOUNTER — HOSPITAL ENCOUNTER (OUTPATIENT)
Dept: RADIOLOGY | Facility: HOSPITAL | Age: 30
Discharge: HOME | End: 2025-02-19
Attending: SURGERY
Payer: COMMERCIAL

## 2025-02-19 ENCOUNTER — OFFICE VISIT (OUTPATIENT)
Dept: OBGYN CLINIC | Facility: CLINIC | Age: 30
End: 2025-02-19

## 2025-02-19 VITALS
BODY MASS INDEX: 37.57 KG/M2 | HEIGHT: 61 IN | WEIGHT: 199 LBS | DIASTOLIC BLOOD PRESSURE: 80 MMHG | SYSTOLIC BLOOD PRESSURE: 116 MMHG

## 2025-02-19 DIAGNOSIS — Z12.4 SCREENING FOR CERVICAL CANCER: ICD-10-CM

## 2025-02-19 DIAGNOSIS — Z01.419 ENCOUNTER FOR GYNECOLOGICAL EXAMINATION WITHOUT ABNORMAL FINDING: Primary | ICD-10-CM

## 2025-02-19 DIAGNOSIS — Z23 NEED FOR HPV VACCINATION: ICD-10-CM

## 2025-02-19 DIAGNOSIS — M79.641 BILATERAL HAND PAIN: ICD-10-CM

## 2025-02-19 DIAGNOSIS — Z12.39 ENCOUNTER FOR BREAST CANCER SCREENING USING NON-MAMMOGRAM MODALITY: ICD-10-CM

## 2025-02-19 DIAGNOSIS — B37.31 VAGINAL CANDIDIASIS: ICD-10-CM

## 2025-02-19 DIAGNOSIS — Z11.3 SCREEN FOR STD (SEXUALLY TRANSMITTED DISEASE): ICD-10-CM

## 2025-02-19 DIAGNOSIS — M79.642 BILATERAL HAND PAIN: ICD-10-CM

## 2025-02-19 DIAGNOSIS — Z11.51 SCREENING FOR HPV (HUMAN PAPILLOMAVIRUS): ICD-10-CM

## 2025-02-19 PROBLEM — F19.10 IV DRUG ABUSE (HCC): Status: RESOLVED | Noted: 2023-05-03 | Resolved: 2025-02-19

## 2025-02-19 PROBLEM — E66.811 OBESITY (BMI 30.0-34.9): Status: RESOLVED | Noted: 2023-10-04 | Resolved: 2025-02-19

## 2025-02-19 PROBLEM — R87.619 ABNORMAL PAP SMEAR OF CERVIX: Status: RESOLVED | Noted: 2022-10-17 | Resolved: 2025-02-19

## 2025-02-19 PROBLEM — Z72.0 TOBACCO ABUSE: Status: RESOLVED | Noted: 2023-05-03 | Resolved: 2025-02-19

## 2025-02-19 PROBLEM — Z86.32 HISTORY OF GESTATIONAL DIABETES: Status: RESOLVED | Noted: 2023-05-03 | Resolved: 2025-02-19

## 2025-02-19 PROBLEM — R76.8 HEPATITIS C ANTIBODY TEST POSITIVE: Status: RESOLVED | Noted: 2023-01-30 | Resolved: 2025-02-19

## 2025-02-19 PROBLEM — Z91.89 HISTORY OF DRUG OVERDOSE: Status: RESOLVED | Noted: 2023-05-03 | Resolved: 2025-02-19

## 2025-02-19 PROBLEM — Z87.59 HISTORY OF GESTATIONAL HYPERTENSION: Status: RESOLVED | Noted: 2022-10-11 | Resolved: 2025-02-19

## 2025-02-19 PROCEDURE — 90651 9VHPV VACCINE 2/3 DOSE IM: CPT | Performed by: NURSE PRACTITIONER

## 2025-02-19 PROCEDURE — 90471 IMMUNIZATION ADMIN: CPT | Performed by: NURSE PRACTITIONER

## 2025-02-19 PROCEDURE — 99395 PREV VISIT EST AGE 18-39: CPT | Performed by: NURSE PRACTITIONER

## 2025-02-19 PROCEDURE — 87491 CHLMYD TRACH DNA AMP PROBE: CPT | Performed by: NURSE PRACTITIONER

## 2025-02-19 PROCEDURE — G0476 HPV COMBO ASSAY CA SCREEN: HCPCS | Performed by: NURSE PRACTITIONER

## 2025-02-19 PROCEDURE — 87563 M. GENITALIUM AMP PROBE: CPT | Performed by: NURSE PRACTITIONER

## 2025-02-19 PROCEDURE — G0145 SCR C/V CYTO,THINLAYER,RESCR: HCPCS | Performed by: STUDENT IN AN ORGANIZED HEALTH CARE EDUCATION/TRAINING PROGRAM

## 2025-02-19 PROCEDURE — 87591 N.GONORRHOEAE DNA AMP PROB: CPT | Performed by: NURSE PRACTITIONER

## 2025-02-19 PROCEDURE — 87661 TRICHOMONAS VAGINALIS AMPLIF: CPT | Performed by: NURSE PRACTITIONER

## 2025-02-19 PROCEDURE — 76882 US LMTD JT/FCL EVL NVASC XTR: CPT

## 2025-02-19 RX ORDER — FLUCONAZOLE 150 MG/1
150 TABLET ORAL ONCE
Qty: 1 TABLET | Refills: 0 | Status: SHIPPED | OUTPATIENT
Start: 2025-02-19 | End: 2025-02-19

## 2025-02-19 RX ORDER — COPPER 313.4 MG/1
1 INTRAUTERINE DEVICE INTRAUTERINE
COMMUNITY

## 2025-02-19 NOTE — PROGRESS NOTES
ANNUAL GYNECOLOGICAL EXAMINATION    Ramona Gallagher is a 29 y.o. female who presents today for annual GYN exam.  Her last pap smear was performed 10/11/2022 and result was HSIL (with no follow up).  She had HIV screening performed 2023 and it was negative.  She reports menses as regular.  Patient's last menstrual period was 2025 (approximate).  Her general medical history has been reviewed and she reports it as follows:    Past Medical History:   Diagnosis Date    Abnormal Pap smear of cervix     2016 abn pap, colpo WNL, cryotherapy; 10/2022 HSIL pap    Asthma     Chlamydia     Depression     Gestational diabetes     Hypothyroidism     Preeclampsia     Urogenital trichomoniasis 2019     Past Surgical History:   Procedure Laterality Date    GYNECOLOGIC CRYOSURGERY  2016     OB History          3    Para   2    Term   2       0    AB   1    Living   2         SAB   0    IAB   1    Ectopic   0    Multiple   0    Live Births   2               Social History     Tobacco Use    Smoking status: Former     Passive exposure: Never    Smokeless tobacco: Never    Tobacco comments:     Quit 3/2023   Vaping Use    Vaping status: Every Day    Substances: Nicotine, THC, Flavoring   Substance Use Topics    Alcohol use: Yes     Comment: couple times/week    Drug use: Yes     Types: Marijuana     Comment: daily vape THC     Social History     Substance and Sexual Activity   Sexual Activity Yes    Partners: Male    Birth control/protection: I.U.D.     Cancer-related family history is negative for Cancer, Breast cancer, Colon cancer, and Ovarian cancer.    Current Outpatient Medications   Medication Instructions    intrauterine copper (PARAGARD) IUD 1 each, Once every 10 years - IUD    levothyroxine 100 mcg, Oral, Daily (early morning)    meloxicam (MOBIC) 15 mg, Oral, Daily       Review of Systems:  Review of Systems   Constitutional: Negative.    Gastrointestinal: Negative.    Genitourinary:   "Positive for vaginal discharge and vaginal pain (itching). Negative for difficulty urinating, menstrual problem and pelvic pain.   Skin: Negative.        Physical Exam:  /80 (BP Location: Left arm, Patient Position: Sitting, Cuff Size: Standard)   Ht 5' 1\" (1.549 m)   Wt 90.3 kg (199 lb)   LMP 02/04/2025 (Approximate)   BMI 37.60 kg/m²   Physical Exam  Constitutional:       General: She is not in acute distress.     Appearance: She is well-developed.   Genitourinary:      Vulva normal.      No lesions in the vagina.      Vaginal discharge (white, adherent) and erythema present.        Right Adnexa: not tender and no mass present.     Left Adnexa: not tender and no mass present.     No cervical motion tenderness or lesion.      IUD strings visualized.      Uterus is not tender.   Breasts:     Right: No mass, nipple discharge, skin change or tenderness.      Left: No mass, nipple discharge, skin change or tenderness.   Neck:      Thyroid: No thyromegaly.   Cardiovascular:      Rate and Rhythm: Normal rate and regular rhythm.   Pulmonary:      Effort: Pulmonary effort is normal.   Abdominal:      Palpations: Abdomen is soft.      Tenderness: There is no abdominal tenderness.   Musculoskeletal:      Cervical back: Neck supple.   Neurological:      Mental Status: She is alert and oriented to person, place, and time.   Skin:     General: Skin is warm and dry.   Vitals reviewed.       Assessment/Plan:   1. Normal well-woman GYN exam.  2. Cervical cancer screening:  Normal cervical exam.  Pap smear done with HPV co-testing.  Has received HPV vaccine x1 dose in 2010, and she desires to continue vaccine series now.  Given HPV vaccine today and she will return in 4 months for subsequent vaccination.   3. STD screening:  Orders placed for vaginal GC/CT, trichomonas/mycoplasma genitalium cultures.  Orders placed for serum anti-HIV, anti-HCV, HbsAg, syphilis panel.   4. Breast cancer screening:  Normal breast exam.  " Reviewed breast self-awareness.   5. Depression Screening: Patient's depression screening was assessed with a PHQ-2 score of 0. Clinically patient does not have depression. No treatment is required.   6. BMI Counseling: Body mass index is 37.6 kg/m². Discussed the patient's BMI with her. The BMI is above normal. Nutrition recommendations include reducing portion sizes and decreasing overall calorie intake.   7. Contraception:  Paragard IUD.   8. Vaginal Candidiasis:  Given Rx Diflucan.   9. Return to office in 1 year for annual GYN exam.    Reviewed with patient that [facility], but that they will not necessarily be reviewed by me immediately.  Explained that I will review results at my earliest opportunity and contact patient appropriately.

## 2025-02-19 NOTE — PROGRESS NOTES
Patient given 2nd hpv on right deltoid on 2/19/25    NDC# 7473-9936-96  LOT# [id number]  EXP# [id number]

## 2025-02-19 NOTE — PATIENT INSTRUCTIONS
Thank you for your confidence in our team.   We appreciate you and welcome your feedback.   If you receive a survey from us, please take a few moments to let us know how we are doing.   Sincerely,  DESMOND Byrd       OBESITY     Obesity is defined as a body mass index (BMI) which is greater than 30. Your Body mass index is 37.6 kg/m²..    The risks of obesity include  many health problems, such as injuries or physical disability. You may need tests to check for the following:  Diabetes     High blood pressure or high cholesterol     Heart disease     Gallbladder or liver disease     Cancer of the colon, breast, prostate, liver, or kidney     Sleep apnea     Arthritis or gout    Seek care immediately if:   You have a severe headache, confusion, or difficulty speaking.     You have weakness on one side of your body.     You have chest pain, sweating, or shortness of breath.    Contact your healthcare provider if:   You have symptoms of gallbladder or liver disease, such as pain in your upper abdomen.    You have knee or hip pain and discomfort while walking.     You have symptoms of diabetes, such as intense hunger and thirst, and frequent urination.     You have symptoms of sleep apnea, such as snoring or daytime sleepiness.     You have questions or concerns about your condition or care.    Treatment for obesity  focuses on helping you lose weight to improve your health. Even a small decrease in BMI can reduce the risk for many health problems. Your healthcare provider will help you set a weight-loss goal.  Lifestyle changes  are the first step in treating obesity. These include making healthy food choices and getting regular physical activity. Your healthcare provider may suggest a weight-loss program that involves coaching, education, and therapy.     Medicine  may help you lose weight when it is used with a healthy diet and physical activity.     Surgery  can help you lose weight if you are very obese  and have other health problems. There are several types of weight-loss surgery. Ask your healthcare provider for more information.    Be successful losing weight:   Set small, realistic goals.  An example of a small goal is to walk for 20 minutes 5 days a week. Anther goal is to lose 5% of your body weight.    Tell friends, family members, and coworkers about your goals  and ask for their support. Ask a friend to lose weight with you, or join a weight-loss support group.    Identify foods or triggers that may cause you to overeat , and find ways to avoid them. Remove tempting high-calorie foods from your home and workplace. Place a bowl of fresh fruit on your kitchen counter. If stress causes you to eat, then find other ways to cope with stress.     Keep a diary to track what you eat and drink.  Also write down how many minutes of physical activity you do each day. Weigh yourself once a week and record it in your diary.    Eating changes:  You will need to eat 500 to 1,000 fewer calories each day than you currently eat to lose 1 to 2 pounds a week. The following changes will help you cut calories:  Eat smaller portions.  Use small plates, no larger than 9 inches in diameter. Fill your plate half full of fruits and vegetables. Measure your food using measuring cups until you know what a serving size looks like.     Eat 3 meals and 1 or 2 snacks each day.  Plan your meals in advance. Cook and eat at home most of the time. Eat slowly.     Eat fruits and vegetables at every meal.  They are low in calories and high in fiber, which makes you feel full. Do not add butter, margarine, or cream sauce to vegetables. Use herbs to season steamed vegetables.     Eat less fat and fewer fried foods.  Eat more baked or grilled chicken and fish. These protein sources are lower in calories and fat than red meat. Limit fast food. Dress your salads with olive oil and vinegar instead of bottled dressing.     Limit the amount of sugar you  eat.  Do not drink sugary beverages. Limit alcohol.    Activity changes:  Physical activity is good for your body in many ways. It helps you burn calories and build strong muscles. It decreases stress and depression, and improves your mood. It can also help you sleep better. Talk to your healthcare provider before you begin an exercise program.  Exercise for at least 30 minutes 5 days a week.  Start slowly. Set aside time each day for physical activity that you enjoy and that is convenient for you. It is best to do both weight training and an activity that increases your heart rate, such as walking, bicycling, or swimming.     Find ways to be more active.  Do yard work and housecleaning. Walk up the stairs instead of using elevators. Spend your leisure time going to events that require walking, such as outdoor festivals or fairs. This extra physical activity can help you lose weight and keep it off.    Follow up with your primary healthcare provider as directed.  You may need to meet with a dietitian. Write down your questions so you remember to ask them during your visits.

## 2025-02-20 LAB
C TRACH DNA SPEC QL NAA+PROBE: NEGATIVE
HPV HR 12 DNA CVX QL NAA+PROBE: POSITIVE
HPV16 DNA CVX QL NAA+PROBE: POSITIVE
HPV18 DNA CVX QL NAA+PROBE: NEGATIVE
N GONORRHOEA DNA SPEC QL NAA+PROBE: NEGATIVE

## 2025-02-21 ENCOUNTER — PATIENT MESSAGE (OUTPATIENT)
Dept: OBGYN CLINIC | Facility: CLINIC | Age: 30
End: 2025-02-21

## 2025-02-21 LAB
M GENITALIUM DNA SPEC QL NAA+PROBE: NEGATIVE
T VAGINALIS DNA SPEC QL NAA+PROBE: NEGATIVE

## 2025-02-26 ENCOUNTER — TELEPHONE (OUTPATIENT)
Dept: OBGYN CLINIC | Facility: CLINIC | Age: 30
End: 2025-02-26

## 2025-02-26 LAB
LAB AP GYN PRIMARY INTERPRETATION: ABNORMAL
Lab: ABNORMAL
PATH INTERP SPEC-IMP: ABNORMAL

## 2025-02-26 PROCEDURE — G0124 SCREEN C/V THIN LAYER BY MD: HCPCS | Performed by: STUDENT IN AN ORGANIZED HEALTH CARE EDUCATION/TRAINING PROGRAM

## 2025-02-26 NOTE — TELEPHONE ENCOUNTER
I contacted patient by phone and reviewed with her that pap smear was abnormal (HSIL with + other HRHPV and + HPV16).  She also had HSIL pap in 10/2022 but never followed up.  Recommend colposcopy and I stressed the importance of follow up this time.  She verbalizes understanding.  I will have my staff contact her to schedule the colposcopy.

## 2025-03-03 ENCOUNTER — OFFICE VISIT (OUTPATIENT)
Dept: OBGYN CLINIC | Facility: CLINIC | Age: 30
End: 2025-03-03
Payer: COMMERCIAL

## 2025-03-03 DIAGNOSIS — G56.22 CUBITAL TUNNEL SYNDROME ON LEFT: Primary | ICD-10-CM

## 2025-03-03 DIAGNOSIS — G56.03 BILATERAL CARPAL TUNNEL SYNDROME: ICD-10-CM

## 2025-03-03 PROCEDURE — 99214 OFFICE O/P EST MOD 30 MIN: CPT | Performed by: SURGERY

## 2025-03-03 RX ORDER — SODIUM CHLORIDE 9 MG/ML
125 INJECTION, SOLUTION INTRAVENOUS CONTINUOUS
OUTPATIENT
Start: 2025-03-03

## 2025-03-03 NOTE — ASSESSMENT & PLAN NOTE
Discussed surgery can be performed  Elected Right CTR  Sedation  Consent signed  All of the risks and benefits of operative treatment were explained to the patient, as well as the risks and benefits of any alternative treatment options, including nonoperative care. This risks of surgery include, but are not limited to, infection, bleeding, blood clot, damage to nerves/arteries, need for further surgery, cardiovascular risk, anesthesia risk, and continued pain.  The patient understood this and elects to proceed forward with surgical intervention.    Orders:    Ambulatory Referral to Orthopedic Surgery

## 2025-03-03 NOTE — H&P (VIEW-ONLY)
ORTHOPAEDIC HAND, WRIST, AND ELBOW OFFICE  VISIT       ASSESSMENT/PLAN:      29 y.o. year old female who presents with Bilateral Carpal and left Cubital tunnel syndrome    The patient verbalized understanding of exam findings and treatment plan. We engaged in the shared decision-making process and treatment options were discussed at length with the patient. Surgical and conservative management discussed today along with risks and benefits.    Assessment & Plan  Bilateral carpal tunnel syndrome  Discussed surgery can be performed  Elected Right CTR  Sedation  Consent signed  All of the risks and benefits of operative treatment were explained to the patient, as well as the risks and benefits of any alternative treatment options, including nonoperative care. This risks of surgery include, but are not limited to, infection, bleeding, blood clot, damage to nerves/arteries, need for further surgery, cardiovascular risk, anesthesia risk, and continued pain.  The patient understood this and elects to proceed forward with surgical intervention.    Orders:    Ambulatory Referral to Orthopedic Surgery    Cubital tunnel syndrome on left  US were reviewed and discussed  Discussed can continue to brace or surgery can be performed             Follow Up:  Return for Post-Op, sutures removed.    ____________________________________________________________________________________________________________________________________________      CHIEF COMPLAINT:  Chief Complaint   Patient presents with    Right Wrist - Pain    Left Hand - Pain       SUBJECTIVE:  Ramona Gallagher is a 29 y.o. year old  female who presents for follow up of bilateral hand numbness   She states snice last visit she has been doing better with less intesne symptoms in her hands. She has been bracing and has since stopped the Mobic and is using extra strength Aleve   Her symptoms are worse still in the [place] and with driving. Right remains the worse side      I have  personally reviewed all the relevant PMH, PSH, SH, FH, Medications and allergies      PAST MEDICAL HISTORY:  Past Medical History:   Diagnosis Date    Abnormal Pap smear of cervix     2016 abn pap, colpo WNL, cryotherapy; 10/2022 HSIL pap; 2/2025 HSIL pap/+ other HRHPV/+ HPV16    Asthma     Chlamydia 2010    Depression     Gestational diabetes 2023    Hepatitis C     Been treated 1/2024    Hypothyroidism     Preeclampsia 2016    Urogenital trichomoniasis 2019       PAST SURGICAL HISTORY:  Past Surgical History:   Procedure Laterality Date    GYNECOLOGIC CRYOSURGERY  2016    HAND SURGERY         FAMILY HISTORY:  Family History   Problem Relation Age of Onset    Hypertension Father     Cancer Neg Hx     Venous thrombosis Neg Hx     Diabetes Neg Hx     Breast cancer Neg Hx     Colon cancer Neg Hx     Ovarian cancer Neg Hx        SOCIAL HISTORY:  Social History     Tobacco Use    Smoking status: Every Day     Current packs/day: 0.30     Average packs/day: 0.3 packs/day for 17.3 years (5.2 ttl pk-yrs)     Types: Cigarettes     Passive exposure: Never    Smokeless tobacco: Current    Tobacco comments:     Quit 3/2023   Vaping Use    Vaping status: Every Day    Substances: Nicotine, THC, Flavoring   Substance Use Topics    Alcohol use: Yes     Alcohol/week: 6.0 standard drinks of alcohol     Types: 6 Standard drinks or equivalent per week     Comment: couple times/week    Drug use: Not Currently     Types: Marijuana     Comment: daily vape THC       MEDICATIONS:    Current Outpatient Medications:     intrauterine copper (PARAGARD) IUD, 1 each by Intrauterine Device route Once every 10 years, Disp: , Rfl:     levothyroxine 100 mcg tablet, Take 1 tablet (100 mcg total) by mouth daily in the early morning, Disp: 100 tablet, Rfl: 3    meloxicam (MOBIC) 15 mg tablet, Take 1 tablet (15 mg total) by mouth daily, Disp: 30 tablet, Rfl: 1    ALLERGIES:  No Known Allergies        REVIEW OF SYSTEMS:  Review of  Systems    VITALS:  There were no vitals filed for this visit.    LABS:  HgA1c:   Lab Results   Component Value Date    HGBA1C 5.4 01/08/2025     BMP:   Lab Results   Component Value Date    CALCIUM 8.7 01/08/2025    K 3.9 01/08/2025    CO2 25 01/08/2025     01/08/2025    BUN 20 01/08/2025    CREATININE 0.52 (L) 01/08/2025       _____________________________________________________  PHYSICAL EXAMINATION:  General: well developed and well nourished, alert, oriented times 3, and appears comfortable  Psychiatric: Normal  HEENT: Normocephalic, Atraumatic Trachea Midline, No torticollis  Pulmonary: No audible wheezing or respiratory distress   Abdomen/GI: Non tender, non distended   Cardiovascular: No pitting edema, 2+ radial pulse   Skin: No masses, erythema, lacerations, fluctation, ulcerations  Neurovascular: Sensation Intact to the Median, Ulnar, Radial Nerve, Motor Intact to the Median, Ulnar, Radial Nerve, and Pulses Intact  Musculoskeletal: Normal, except as noted in detailed exam and in HPI.      MUSCULOSKELETAL EXAMINATION:  Right hand:  SILT  Composite fist        Left hand:  SILT  Composite fist    ___________________________________________________  STUDIES REVIEWED:      I have personally reviewed and interpreted  US of bilateral elbows which demonstrate   which demonstrate      FINDINGS:     RIGHT SIDE:  Ulnar nerve cross sectional area proximally: 3.7 sq mm.     Maximum ulnar nerve cross sectional area within or near the cubital tunnel: 7.9 sq mm.     Ulnar nerve cross sectional area distal to cubital tunnel: 5.3 sq mm.     Maximal ulnar nerve/proximal cross sectional ratio: 2.1     No evidence of ulnar nerve dislocation from the cubital tunnel on dynamic flexion-extension evaluation.     No accessory anconeus epitrochlearis muscle.        LEFT SIDE:     Ulnar nerve cross sectional area proximally: 4.6 sq mm.     Maximum ulnar nerve cross sectional area within or near the cubital tunnel: 14.1 sq  mm.     Ulnar nerve cross sectional area distal to cubital tunnel: 7.3 sq mm.     Maximal ulnar nerve/proximal cross sectional ratio: 3.1     No evidence of ulnar nerve dislocation from the cubital tunnel on dynamic flexion-extension evaluation.     No accessory anconeus epitrochlearis muscle.     IMPRESSION:     RIGHT CUBITAL TUNNEL: No sonographic findings to suggest ulnar neuropathy/cubital tunnel syndrome.     LEFT CUBITAL TUNNEL: Enlarged ulnar nerve near/within cubital tunnel, suggestive of ulnar neuropathy/cubital tunnel syndrome.  I have personally reviewed and interpreted  US of bilateral wrists which demonstrate       FINDINGS:     RIGHT SIDE:  Median nerve cross sectional area distal forearm (CSAp): 6.6 sq mm.     Maximum median nerve cross sectional area within carpal tunnel (CSAc): 19.1 sq mm.     Delta CSA (CSAc-CSAp): 12.5 sq mm.     Wrist to Forearm ratio (WFR ratio) (CSAc/CSAp): 2.9.     Vascularity: No hypervascularity.           LEFT SIDE:  Median nerve cross sectional area distal forearm (CSAp): 12.5 sq mm.     Maximum median nerve cross sectional area within carpal tunnel (CSAc): 16.9 sq mm.     Delta CSA (CSAc-CSAp): 4.4 sq mm.     Wrist to Forearm ratio (WFR ratio) (CSAc/CSAp): 1.4.     Vascularity: Abnormal Hypervascularity detected.           IMPRESSION:     RIGHT SIDE: Carpal tunnel syndrome ruled in based on marked abnormal CSA >/= 14 sq mm.     LEFT SIDE: Carpal tunnel syndrome ruled in based on marked abnormal CSA >/= 14 sq mm.      PROCEDURES PERFORMED:  Procedures  No Procedures performed today    _____________________________________________________      Scribe Attestation      I,:  Dhruv Hoyos am acting as a scribe while in the presence of the attending physician.:       I,:  Bunny De La Fuente MD personally performed the services described in this documentation    as scribed in my presence.:

## 2025-03-24 ENCOUNTER — PROCEDURE VISIT (OUTPATIENT)
Dept: OBGYN CLINIC | Facility: CLINIC | Age: 30
End: 2025-03-24

## 2025-03-24 ENCOUNTER — ANESTHESIA EVENT (OUTPATIENT)
Dept: PERIOP | Facility: HOSPITAL | Age: 30
End: 2025-03-24
Payer: COMMERCIAL

## 2025-03-24 VITALS
BODY MASS INDEX: 38.02 KG/M2 | WEIGHT: 201.4 LBS | DIASTOLIC BLOOD PRESSURE: 82 MMHG | SYSTOLIC BLOOD PRESSURE: 132 MMHG | HEIGHT: 61 IN

## 2025-03-24 DIAGNOSIS — R87.810 HIGH-RISK HUMAN PAPILLOMAVIRUS (HPV) DNA DETECTED IN CERVICAL SPECIMEN: ICD-10-CM

## 2025-03-24 DIAGNOSIS — R87.810 HUMAN PAPILLOMAVIRUS (HPV) TYPE 16 DNA DETECTED IN CERVICAL SPECIMEN: ICD-10-CM

## 2025-03-24 DIAGNOSIS — R87.613 HGSIL (HIGH GRADE SQUAMOUS INTRAEPITHELIAL LESION) ON PAP SMEAR OF CERVIX: Primary | ICD-10-CM

## 2025-03-24 LAB — SL AMB POCT URINE HCG: NEGATIVE

## 2025-03-24 PROCEDURE — 57454 BX/CURETT OF CERVIX W/SCOPE: CPT | Performed by: OBSTETRICS & GYNECOLOGY

## 2025-03-24 PROCEDURE — 88344 IMHCHEM/IMCYTCHM EA MLT ANTB: CPT | Performed by: PATHOLOGY

## 2025-03-24 PROCEDURE — 81025 URINE PREGNANCY TEST: CPT | Performed by: OBSTETRICS & GYNECOLOGY

## 2025-03-24 PROCEDURE — 88305 TISSUE EXAM BY PATHOLOGIST: CPT | Performed by: PATHOLOGY

## 2025-03-24 RX ORDER — BUPRENORPHINE 100 MG/1
SOLUTION SUBCUTANEOUS
COMMUNITY
Start: 2025-03-04

## 2025-03-24 NOTE — ANESTHESIA PREPROCEDURE EVALUATION
Procedure:  RELEASE CARPAL TUNNEL, RIGHT (Right: Wrist)    Relevant Problems   No relevant active problems        Physical Exam    Airway    Mallampati score: II  TM Distance: <3 FB  Neck ROM: full     Dental   No notable dental hx     Cardiovascular  Cardiovascular exam normal    Pulmonary  Pulmonary exam normal     Other Findings  post-pubertal.    Anesthesia Plan  ASA Score- 2     Anesthesia Type- IV sedation with anesthesia with ASA Monitors.         Additional Monitors:     Airway Plan: LMA.           Plan Factors-Exercise tolerance (METS): >4 METS.    Chart reviewed.   Existing labs reviewed.     Patient is a current smoker.  Patient instructed to abstain from smoking on day of procedure. Patient smoked on day of surgery.            Induction- intravenous.    Postoperative Plan-         Informed Consent- Anesthetic plan and risks discussed with patient.  I personally reviewed this patient with the CRNA. Discussed and agreed on the Anesthesia Plan with the CRNA..      NPO Status:  No vitals data found for the desired time range.

## 2025-03-24 NOTE — PRE-PROCEDURE INSTRUCTIONS
Pre-Surgery Instructions:   Medication Instructions    intrauterine copper (PARAGARD) IUD In place    levothyroxine 100 mcg tablet Take day of surgery.    meloxicam (MOBIC) 15 mg tablet Stop taking 1 day prior to surgery.    Sublocade 100 MG/0.5ML LD 3/12, ND 4/12   Medication instructions for day of surgery reviewed. Please take all instructed medications with only a sip of water.       You will receive a call one business day prior to surgery with an arrival time and hospital directions. If your surgery is scheduled on a Monday, the hospital will be calling you on the Friday prior to your surgery. If you have not heard from anyone by 8pm, please call the hospital supervisor through the hospital  at [phone]. ([place] [phone] or [place] [phone]).    Do not eat or drink anything after midnight the night before your surgery, including candy, mints, lifesavers, or chewing gum. Do not drink alcohol 24hrs before your surgery. Try not to smoke at least 24hrs before your surgery.       Follow the pre surgery showering instructions as listed in the “My Surgical Experience Booklet” or otherwise provided by your surgeon's office. Do not use a blade to shave the surgical area 1 week before surgery. It is okay to use a clean electric clippers up to 24 hours before surgery. Do not apply any lotions, creams, including makeup, cologne, deodorant, or perfumes after showering on the day of your surgery. Do not use dry shampoo, hair spray, hair gel, or any type of hair products.     No contact lenses, eye make-up, or artificial eyelashes. Remove nail polish, including gel polish, and any artificial, gel, or acrylic nails if possible. Remove all jewelry including rings and body piercing jewelry.     Wear causal clothing that is easy to take on and off. Consider your type of surgery.    Keep any valuables, jewelry, piercings at home. Please bring any specially ordered equipment (sling, braces) if  indicated.    Arrange for a responsible person to drive you to and from the hospital on the day of your surgery. Please confirm the visitor policy for the day of your procedure when you receive your phone call with an arrival time.     Call the surgeon's office with any new illnesses, exposures, or additional questions prior to surgery.    Please reference your “My Surgical Experience Booklet” for additional information to prepare for your upcoming surgery.

## 2025-03-25 ENCOUNTER — ANESTHESIA (OUTPATIENT)
Dept: PERIOP | Facility: HOSPITAL | Age: 30
End: 2025-03-25
Payer: COMMERCIAL

## 2025-03-25 ENCOUNTER — HOSPITAL ENCOUNTER (OUTPATIENT)
Facility: HOSPITAL | Age: 30
Setting detail: OUTPATIENT SURGERY
Discharge: HOME | End: 2025-03-25
Attending: SURGERY | Admitting: SURGERY
Payer: COMMERCIAL

## 2025-03-25 VITALS
RESPIRATION RATE: 16 BRPM | HEIGHT: 61 IN | OXYGEN SATURATION: 95 % | WEIGHT: 201 LBS | BODY MASS INDEX: 37.95 KG/M2 | HEART RATE: 60 BPM | TEMPERATURE: 97.4 F | DIASTOLIC BLOOD PRESSURE: 72 MMHG | SYSTOLIC BLOOD PRESSURE: 102 MMHG

## 2025-03-25 DIAGNOSIS — G56.03 BILATERAL CARPAL TUNNEL SYNDROME: Primary | ICD-10-CM

## 2025-03-25 LAB
EXT PREGNANCY TEST URINE: NEGATIVE
EXT. CONTROL: NORMAL

## 2025-03-25 PROCEDURE — 64721 CARPAL TUNNEL SURGERY: CPT | Performed by: PHYSICIAN ASSISTANT

## 2025-03-25 PROCEDURE — 64721 CARPAL TUNNEL SURGERY: CPT | Performed by: SURGERY

## 2025-03-25 PROCEDURE — 81025 URINE PREGNANCY TEST: CPT | Performed by: ANESTHESIOLOGY

## 2025-03-25 RX ORDER — PROPOFOL 10 MG/ML
INJECTION, EMULSION INTRAVENOUS AS NEEDED
Status: DISCONTINUED | OUTPATIENT
Start: 2025-03-25 | End: 2025-03-25

## 2025-03-25 RX ORDER — ONDANSETRON 2 MG/ML
INJECTION INTRAMUSCULAR; INTRAVENOUS AS NEEDED
Status: DISCONTINUED | OUTPATIENT
Start: 2025-03-25 | End: 2025-03-25

## 2025-03-25 RX ORDER — MAGNESIUM HYDROXIDE 1200 MG/15ML
LIQUID ORAL AS NEEDED
Status: DISCONTINUED | OUTPATIENT
Start: 2025-03-25 | End: 2025-03-25 | Stop reason: HOSPADM

## 2025-03-25 RX ORDER — ACETAMINOPHEN 325 MG/1
650 TABLET ORAL EVERY 6 HOURS PRN
Status: DISCONTINUED | OUTPATIENT
Start: 2025-03-25 | End: 2025-03-25 | Stop reason: HOSPADM

## 2025-03-25 RX ORDER — LIDOCAINE HYDROCHLORIDE 10 MG/ML
INJECTION, SOLUTION EPIDURAL; INFILTRATION; INTRACAUDAL; PERINEURAL AS NEEDED
Status: DISCONTINUED | OUTPATIENT
Start: 2025-03-25 | End: 2025-03-25

## 2025-03-25 RX ORDER — SODIUM CHLORIDE, SODIUM LACTATE, POTASSIUM CHLORIDE, CALCIUM CHLORIDE 600; 310; 30; 20 MG/100ML; MG/100ML; MG/100ML; MG/100ML
125 INJECTION, SOLUTION INTRAVENOUS CONTINUOUS
Status: DISCONTINUED | OUTPATIENT
Start: 2025-03-25 | End: 2025-03-25 | Stop reason: HOSPADM

## 2025-03-25 RX ORDER — OXYCODONE HYDROCHLORIDE 10 MG/1
5 TABLET ORAL EVERY 4 HOURS PRN
Refills: 0 | Status: DISCONTINUED | OUTPATIENT
Start: 2025-03-25 | End: 2025-03-25 | Stop reason: HOSPADM

## 2025-03-25 RX ORDER — PROMETHAZINE HYDROCHLORIDE 25 MG/ML
12.5 INJECTION, SOLUTION INTRAMUSCULAR; INTRAVENOUS ONCE AS NEEDED
Status: DISCONTINUED | OUTPATIENT
Start: 2025-03-25 | End: 2025-03-25 | Stop reason: HOSPADM

## 2025-03-25 RX ORDER — NAPROXEN 500 MG/1
500 TABLET ORAL 2 TIMES DAILY WITH MEALS
Qty: 10 TABLET | Refills: 0 | Status: SHIPPED | OUTPATIENT
Start: 2025-03-25 | End: 2025-03-30

## 2025-03-25 RX ORDER — FENTANYL CITRATE 50 UG/ML
25 INJECTION, SOLUTION INTRAMUSCULAR; INTRAVENOUS
Status: DISCONTINUED | OUTPATIENT
Start: 2025-03-25 | End: 2025-03-25 | Stop reason: HOSPADM

## 2025-03-25 RX ORDER — DEXAMETHASONE SODIUM PHOSPHATE 10 MG/ML
INJECTION, SOLUTION INTRAMUSCULAR; INTRAVENOUS AS NEEDED
Status: DISCONTINUED | OUTPATIENT
Start: 2025-03-25 | End: 2025-03-25

## 2025-03-25 RX ORDER — ONDANSETRON 2 MG/ML
4 INJECTION INTRAMUSCULAR; INTRAVENOUS ONCE AS NEEDED
Status: DISCONTINUED | OUTPATIENT
Start: 2025-03-25 | End: 2025-03-25 | Stop reason: HOSPADM

## 2025-03-25 RX ORDER — SODIUM CHLORIDE 9 MG/ML
125 INJECTION, SOLUTION INTRAVENOUS CONTINUOUS
Status: DISCONTINUED | OUTPATIENT
Start: 2025-03-25 | End: 2025-03-25 | Stop reason: HOSPADM

## 2025-03-25 RX ADMIN — LIDOCAINE HYDROCHLORIDE 40 MG: 10 SOLUTION INTRAVENOUS at 08:38

## 2025-03-25 RX ADMIN — PROPOFOL 20 MG: 10 INJECTION, EMULSION INTRAVENOUS at 08:43

## 2025-03-25 RX ADMIN — PROPOFOL 50 MG: 10 INJECTION, EMULSION INTRAVENOUS at 08:39

## 2025-03-25 RX ADMIN — PROPOFOL 20 MG: 10 INJECTION, EMULSION INTRAVENOUS at 08:46

## 2025-03-25 RX ADMIN — SODIUM CHLORIDE, SODIUM LACTATE, POTASSIUM CHLORIDE, AND CALCIUM CHLORIDE: .6; .31; .03; .02 INJECTION, SOLUTION INTRAVENOUS at 08:25

## 2025-03-25 RX ADMIN — DEXAMETHASONE SODIUM PHOSPHATE 10 MG: 10 INJECTION INTRAMUSCULAR; INTRAVENOUS at 08:41

## 2025-03-25 RX ADMIN — PROPOFOL 50 MCG/KG/MIN: 10 INJECTION, EMULSION INTRAVENOUS at 08:47

## 2025-03-25 RX ADMIN — PROPOFOL 100 MG: 10 INJECTION, EMULSION INTRAVENOUS at 08:38

## 2025-03-25 RX ADMIN — ONDANSETRON 4 MG: 2 INJECTION INTRAMUSCULAR; INTRAVENOUS at 08:40

## 2025-03-25 NOTE — NURSING NOTE
No distress. Denies pain. Declined food/drinks. She just wanted to rest. IV removed intact. Dressing/ace wrap remains clean, dry and intact. Fingers pink, warm to touch with brisk capillary refill.

## 2025-03-25 NOTE — ANESTHESIA POSTPROCEDURE EVALUATION
Post-Op Assessment Note    Last Filed PACU Vitals:  Vitals Value Taken Time   Temp 97.3 °F (36.3 °C) 03/25/25 0859   Pulse 66 03/25/25 0920   /63 03/25/25 0915   Resp 36 03/25/25 0919   SpO2 86 % 03/25/25 0920   Vitals shown include unfiled device data.    Modified Anu:     Vitals Value Taken Time   Activity 2 03/25/25 0915   Respiration 2 03/25/25 0915   Circulation 2 03/25/25 0915   Consciousness 2 03/25/25 0915   Oxygen Saturation 2 03/25/25 0915     Modified Anu Score: 10

## 2025-03-25 NOTE — DISCHARGE INSTR - AVS FIRST PAGE
Elevate hand above heart as much as possible to help pain and swelling.  May use hand for simple tasks, but no heavy lifting or tight squeezing x 2 weeks.  Keep operative bandage clean and dry. You may remove bandage in 4 days, just place a band-aid over incision.  You are permitted to shower after 4 days with band-aid off.  Perform simple finger motion exercises: opening & closing fingers 10 x every hr.  Follow-up in the office per your scheduled post-op appointment for suture removal 4/7/2025.

## 2025-03-25 NOTE — PROGRESS NOTES
Colposcopy    Date/Time: 3/24/2025 2:50 PM    Performed by: Yardlie Toussaint-Foster, DO  Authorized by: Yardlie Toussaint-Foster, DO    Verbal consent obtained?: Yes    Written consent obtained?: Yes    Risks and benefits: Risks, benefits and alternatives were discussed    Consent given by:  Patient  Time Out:     Time out: Immediately prior to the procedure a time out was called      Time out performed at:  3/24/2025 2:50 PM  Patient states understanding of procedure being performed: Yes    Patient's understanding of procedure matches consent: Yes    Procedure consent matches procedure scheduled: Yes    Relevant documents present and verified: Yes    Test results available and properly labeled: Yes    Patient identity confirmed:  Verbally with patient and provided demographic data  Indication:     Indication:  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)  Procedure:     Procedure: Colposcopy w/ cervical biopsy and ECC      Under satisfactory analgesia the patient was prepped and draped in the dorsal lithotomy position: yes      [place] speculum was placed in the vagina: yes      Under colposcopic examination the transition zone was seen in entirety: yes      Endocervix was curetted using a Kevorkian curette: yes      Cervical biopsy performed with a cervical biopsy punch: yes      Monsel's solution was applied: yes      Specimen(s) to pathology: yes    Post-procedure:     Findings: Mosaicism and White epithelium      Impression: High grade cervical dysplasia      Patient tolerance of procedure:  Tolerated well, no immediate complications  Comments:      Discuss with patient with persistent HGSIL and HPV 16 and HPV HR positive recommend a LEEP.  Consent signed for LEEP awaiting colposcopy path.

## 2025-03-25 NOTE — INTERVAL H&P NOTE
H&P reviewed. After examining the patient I find no changes in the patients condition since the H&P had been written.    Vitals:    03/25/25 0736   BP: 109/61   Pulse: 69   Resp: 18   Temp: (!) 97.4 °F (36.3 °C)   SpO2: 96%

## 2025-03-25 NOTE — OP NOTE
OPERATIVE REPORT  PATIENT NAME: Ramona Gallagher    :  1995  MRN: [id number]  Pt Location:  OR ROOM 11    SURGERY DATE: 3/25/2025    Surgeons and Role:     * Bunny De La Fuente MD - Primary     * Jorge Gonzalez PA-C - Assisting    Preop Diagnosis:  Bilateral carpal tunnel syndrome [G56.03]    Post-Op Diagnosis Codes:     * Bilateral carpal tunnel syndrome [G56.03]    Procedure(s):  Right - RELEASE CARPAL TUNNEL. RIGHT    Specimen(s):  * No specimens in log *    Estimated Blood Loss:   Minimal    Drains:  * No LDAs found *    Anesthesia Type:   IV Sedation with Anesthesia    Operative Indications:  Bilateral carpal tunnel syndrome [G56.03]      Operative Findings:  Healthy appearing nerve after release      Complications:   None    Procedure and Technique:  The patient's right hand was cleansed with alcohol.  A field block was performed using marcaine 0.25% with epinephrine and lidocaine 1% with epinephrine in a 50-50 mixture.  The right upper extremity was prepped and draped in a sterile fashion.  A longitudinal incision was made overlying the transverse carpal ligament in line with the ring finger in a flexed position.  Sharp dissection was performed down to the level of the transverse carpal ligament.  A Weitlaner was used for counterretraction.  The transverse carpal ligament was divided with a 15 blade scalpel distally, and tenotomy scissors proximally along with a portion of the distal antebrachial fascia.  The wound was irrigated copiously with normal saline.  The skin was approximated with 4-0 nylon in an interrupted horizontal mattress fashion.  Xeroform was applied followed by gauze and an ace bandage over wrap.  The patient was transferred to recovery room in stable condition.      I was present for the entire procedure.    Patient Disposition:  PACU       My Assistant was necessary throughout the procedure(s) for retraction and positioning.    I understand that section 3872 (b)(7)(D) of the Social  Security Act generally prohibits Medicare physician fee schedule payment for the services of assistants-at-surgery in teaching hospitals when qualified residents are available to furnish such services. I certify that the services for which payment is claimed were medically necessary, and that no qualified resident was available to perform the services. I further understand that these services are subject to post-payment review by the Medicare carrier.         SIGNATURE: Bunny De La Fuente MD  DATE: March 25, 2025  TIME: 8:59 AM

## 2025-03-25 NOTE — ANESTHESIA POSTPROCEDURE EVALUATION
Post-Op Assessment Note    CV Status:  Stable  Pain Score: 0    Pain management: adequate       Mental Status:  Alert and awake   Hydration Status:  Euvolemic   PONV Controlled:  Controlled   Airway Patency:  Patent     Post Op Vitals Reviewed: Yes    No anethesia notable event occurred.    Staff: CRNA           Last Filed PACU Vitals:  Vitals Value Taken Time   Temp 97.1    Pulse 67 03/25/25 0903   /57 03/25/25 0858   Resp 13 03/25/25 0903   SpO2 93 % 03/25/25 0903   Vitals shown include unfiled device data.

## 2025-03-28 PROCEDURE — 88344 IMHCHEM/IMCYTCHM EA MLT ANTB: CPT | Performed by: PATHOLOGY

## 2025-03-28 PROCEDURE — 88305 TISSUE EXAM BY PATHOLOGIST: CPT | Performed by: PATHOLOGY

## 2025-04-07 ENCOUNTER — OFFICE VISIT (OUTPATIENT)
Dept: OBGYN CLINIC | Facility: CLINIC | Age: 30
End: 2025-04-07

## 2025-04-07 DIAGNOSIS — G56.03 BILATERAL CARPAL TUNNEL SYNDROME: Primary | ICD-10-CM

## 2025-04-07 PROCEDURE — 99024 POSTOP FOLLOW-UP VISIT: CPT | Performed by: SURGERY

## 2025-04-07 NOTE — PROGRESS NOTES
HPI:  Pt is a 28 yo female s/p right carpal tunnel release on 3/25/25.  Pt states that she is doing well.  Denies fever/chills.  She notes some skin irritation from band aid use, so stopped using band aids a few days ago.      PE:  RUE:  incision healing well, some skin excoriation proximal to the volar wrist crease, no erythema, sutures in place, able to make a full fist complex, SILT    Assessment & Plan  Bilateral carpal tunnel syndrome     Sutures removed   Scar massage   Increase activity level as tolerated   NSAIDs as needed for discomfort   She would like to wait on the left side   F/u in 4 weeks

## 2025-04-23 ENCOUNTER — OFFICE VISIT (OUTPATIENT)
Dept: OBGYN CLINIC | Facility: CLINIC | Age: 30
End: 2025-04-23

## 2025-04-23 VITALS — BODY MASS INDEX: 36.01 KG/M2 | SYSTOLIC BLOOD PRESSURE: 120 MMHG | DIASTOLIC BLOOD PRESSURE: 80 MMHG | WEIGHT: 190.6 LBS

## 2025-04-23 DIAGNOSIS — D06.9 HIGH GRADE SQUAMOUS INTRAEPITHELIAL LESION (HGSIL), GRADE 3 CIN, ON BIOPSY OF CERVIX: ICD-10-CM

## 2025-04-23 DIAGNOSIS — R87.613 HGSIL ON PAP SMEAR OF CERVIX: ICD-10-CM

## 2025-04-23 DIAGNOSIS — Z71.2 ENCOUNTER TO DISCUSS TEST RESULTS: Primary | ICD-10-CM

## 2025-04-23 PROCEDURE — 99213 OFFICE O/P EST LOW 20 MIN: CPT | Performed by: OBSTETRICS & GYNECOLOGY

## 2025-04-23 NOTE — PROGRESS NOTES
PROBLEM GYNECOLOGICAL VISIT    Ramona Gallagher is a 29 y.o. female who presents today for resutls.  Her general medical history has been reviewed and she reports it as follows:    Past Medical History:   Diagnosis Date    Abnormal Pap smear of cervix     2016 abn pap, colpo WNL, cryotherapy; 10/2022 HSIL pap; 2025 HSIL pap/+ other HRHPV/+ HPV16    Asthma     Chlamydia     Depression     Gestational diabetes     Hepatitis C     Been treated 2024    Hypothyroidism     Preeclampsia     Urogenital trichomoniasis      Past Surgical History:   Procedure Laterality Date    GYNECOLOGIC CRYOSURGERY  2016    HAND SURGERY      [facility] NEUROPLASTY &/TRANSPOS MEDIAN NRV CARPAL TUNNE Right 3/25/2025    Procedure: RELEASE CARPAL TUNNEL, RIGHT;  Surgeon: Bunny De La Fuente MD;  Location:  MAIN OR;  Service: Orthopedics     OB History          3    Para   2    Term   2       0    AB   1    Living   2         SAB   0    IAB   1    Ectopic   0    Multiple   0    Live Births   2               Social History     Tobacco Use    Smoking status: Former     Current packs/day: 0.30     Average packs/day: 0.3 packs/day for 17.3 years (5.2 ttl pk-yrs)     Types: Cigarettes     Passive exposure: Never    Smokeless tobacco: Current    Tobacco comments:     Quit 3/2023   Vaping Use    Vaping status: Every Day    Substances: Nicotine, THC, Flavoring   Substance Use Topics    Alcohol use: Yes     Alcohol/week: 6.0 standard drinks of alcohol     Types: 6 Standard drinks or equivalent per week     Comment: couple times/week    Drug use: Not Currently     Types: Marijuana     Comment: daily vape THC last 3/18     Social History     Substance and Sexual Activity   Sexual Activity Yes    Partners: Male    Birth control/protection: I.U.D.       Current Outpatient Medications   Medication Instructions    intrauterine copper (PARAGARD) IUD 1 each, Once every 10 years - IUD    levothyroxine 100 mcg, Oral, Daily (early morning)     naproxen (NAPROSYN) 500 mg, Oral, 2 times daily with meals    Sublocade 100 MG/0.5ML        History of Present Illness:   Patient presents for results s/p colposcopy for HGSIL pap smear with no new complaints.    Review of Systems:  Review of Systems   Genitourinary:  Negative for pelvic pain, vaginal bleeding, vaginal discharge and vaginal pain.   All other systems reviewed and are negative.      Physical Exam:  /80 (BP Location: Left arm, Patient Position: Sitting, Cuff Size: Standard)   Wt 86.5 kg (190 lb 9.6 oz)   LMP 04/12/2025 (Exact Date)   BMI 36.01 kg/m²   Physical Exam  Constitutional:       Appearance: Normal appearance.   Neurological:      Mental Status: She is alert.   Vitals and nursing note reviewed.   Discussion:  Discuss with patient pathology consist of MARIANELA 3 recommend a LEEP which will be done in the office. Risk and benefits discussed.  Consent signed for LEEP. Discuss with patient will prescribe valium the day prior to be taken 2hrs prior to her procedure and must have a .     Assessment:   1. MARIANELA 3    Plan:   1. Consent signed for LEEP   2. Return to office 5/23/25.     Review[facility] with patient that [facility], but that they will not necessarily be reviewed by me immediately.  Explained that I will review results at my earliest opportunity and contact patient appropriately.

## 2025-05-06 ENCOUNTER — OFFICE VISIT (OUTPATIENT)
Dept: OBGYN CLINIC | Facility: CLINIC | Age: 30
End: 2025-05-06

## 2025-05-06 VITALS — WEIGHT: 190 LBS | HEIGHT: 61 IN | BODY MASS INDEX: 35.87 KG/M2

## 2025-05-06 DIAGNOSIS — G56.22 CUBITAL TUNNEL SYNDROME ON LEFT: ICD-10-CM

## 2025-05-06 DIAGNOSIS — G56.03 BILATERAL CARPAL TUNNEL SYNDROME: Primary | ICD-10-CM

## 2025-05-06 PROCEDURE — 99024 POSTOP FOLLOW-UP VISIT: CPT | Performed by: SURGERY

## 2025-05-06 NOTE — ASSESSMENT & PLAN NOTE
Pt would prefer to monitor her symptoms of the left for now.   She will call if she would like surgery in the future if becomes more symptomatic

## 2025-05-22 DIAGNOSIS — D06.9 HIGH GRADE SQUAMOUS INTRAEPITHELIAL LESION (HGSIL), GRADE 3 CIN, ON BIOPSY OF CERVIX: Primary | ICD-10-CM

## 2025-05-22 RX ORDER — DIAZEPAM 10 MG/1
TABLET ORAL
Qty: 1 TABLET | Refills: 0 | Status: SHIPPED | OUTPATIENT
Start: 2025-05-22

## 2025-06-03 ENCOUNTER — TELEPHONE (OUTPATIENT)
Dept: OBGYN CLINIC | Facility: CLINIC | Age: 30
End: 2025-06-03

## 2025-06-18 ENCOUNTER — CLINICAL SUPPORT (OUTPATIENT)
Dept: OBGYN CLINIC | Facility: CLINIC | Age: 30
End: 2025-06-18

## 2025-06-18 VITALS — DIASTOLIC BLOOD PRESSURE: 70 MMHG | SYSTOLIC BLOOD PRESSURE: 110 MMHG | BODY MASS INDEX: 37.22 KG/M2 | WEIGHT: 197 LBS

## 2025-06-18 DIAGNOSIS — Z23 NEED FOR HPV VACCINATION: Primary | ICD-10-CM

## 2025-06-18 PROCEDURE — 90651 9VHPV VACCINE 2/3 DOSE IM: CPT

## 2025-06-18 PROCEDURE — 90471 IMMUNIZATION ADMIN: CPT

## 2025-06-23 ENCOUNTER — TELEPHONE (OUTPATIENT)
Dept: OBGYN CLINIC | Facility: CLINIC | Age: 30
End: 2025-06-23

## 2025-07-24 ENCOUNTER — TELEPHONE (OUTPATIENT)
Dept: OBGYN CLINIC | Facility: CLINIC | Age: 30
End: 2025-07-24

## (undated) DEVICE — USED IN CONJUNCTION WITH A SYRINGE AS AN ADDITIVE DEVICE FOR ASPIRATION FROM MULTI-DOSE MEDICINE VIALS OR INJECTION INTO I.V. SYSTEMS AND PRE-SLIT SEPTUMS COVERING INJECTION SITES.: Brand: SOL-M™ BLUNT FILL NEEDLE

## (undated) DEVICE — STERILE POLYISOPRENE POWDER-FREE SURGICAL GLOVES: Brand: PROTEXIS

## (undated) DEVICE — STERILE POLYISOPRENE POWDER-FREE SURGICAL GLOVES WITH EMOLLIENT COATING: Brand: PROTEXIS

## (undated) DEVICE — INTENDED FOR TISSUE SEPARATION, AND OTHER PROCEDURES THAT REQUIRE A SHARP SURGICAL BLADE TO PUNCTURE OR CUT.: Brand: BARD-PARKER ® SAFETYLOCK CARBON RIB-BACK BLADES

## (undated) DEVICE — DISPOSABLE OR TOWEL: Brand: CARDINAL HEALTH

## (undated) DEVICE — SYRINGE 10ML LL

## (undated) DEVICE — STERILE BETHLEHEM PLASTIC HAND: Brand: CARDINAL HEALTH

## (undated) DEVICE — SUT ETHILON 4-0 PS-2 18 IN 1667H

## (undated) DEVICE — NEEDLE 23G X 1 1/2 SAFETY-GLIDE THIN WALL